# Patient Record
Sex: FEMALE | Race: WHITE | Employment: OTHER | URBAN - METROPOLITAN AREA
[De-identification: names, ages, dates, MRNs, and addresses within clinical notes are randomized per-mention and may not be internally consistent; named-entity substitution may affect disease eponyms.]

---

## 2017-02-08 RX ORDER — PRAVASTATIN SODIUM 20 MG/1
10 TABLET ORAL
Qty: 45 TAB | Refills: 3 | Status: SHIPPED | OUTPATIENT
Start: 2017-02-08 | End: 2017-08-07 | Stop reason: SDUPTHER

## 2017-02-08 NOTE — TELEPHONE ENCOUNTER
Requested Prescriptions     Signed Prescriptions Disp Refills    pravastatin (PRAVACHOL) 20 mg tablet 45 Tab 3     Sig: Take 0.5 Tabs by mouth nightly.      Authorizing Provider: Jaycee Bobo     Ordering User: Concepcion Alfaro

## 2017-08-07 ENCOUNTER — OFFICE VISIT (OUTPATIENT)
Dept: CARDIOLOGY CLINIC | Age: 62
End: 2017-08-07

## 2017-08-07 VITALS
BODY MASS INDEX: 26.55 KG/M2 | SYSTOLIC BLOOD PRESSURE: 130 MMHG | DIASTOLIC BLOOD PRESSURE: 88 MMHG | WEIGHT: 165.2 LBS | HEIGHT: 66 IN | HEART RATE: 68 BPM

## 2017-08-07 DIAGNOSIS — I10 ESSENTIAL HYPERTENSION: Primary | ICD-10-CM

## 2017-08-07 DIAGNOSIS — E78.5 DYSLIPIDEMIA: ICD-10-CM

## 2017-08-07 DIAGNOSIS — R01.1 SYSTOLIC EJECTION MURMUR: ICD-10-CM

## 2017-08-07 DIAGNOSIS — I36.1 NON-RHEUMATIC TRICUSPID VALVE INSUFFICIENCY: ICD-10-CM

## 2017-08-07 DIAGNOSIS — I49.3 PVC (PREMATURE VENTRICULAR CONTRACTION): ICD-10-CM

## 2017-08-07 DIAGNOSIS — I34.0 NON-RHEUMATIC MITRAL REGURGITATION: ICD-10-CM

## 2017-08-07 DIAGNOSIS — E87.6 HYPOKALEMIA: ICD-10-CM

## 2017-08-07 DIAGNOSIS — I25.10 CORONARY ARTERY DISEASE INVOLVING NATIVE HEART WITHOUT ANGINA PECTORIS, UNSPECIFIED VESSEL OR LESION TYPE: ICD-10-CM

## 2017-08-07 RX ORDER — RANITIDINE 150 MG/1
150 TABLET, FILM COATED ORAL 2 TIMES DAILY
COMMUNITY
End: 2018-02-12

## 2017-08-07 RX ORDER — PRAVASTATIN SODIUM 20 MG/1
20 TABLET ORAL
Qty: 90 TAB | Refills: 3 | Status: SHIPPED | OUTPATIENT
Start: 2017-08-07 | End: 2019-02-07 | Stop reason: SDUPTHER

## 2017-08-07 RX ORDER — POTASSIUM CHLORIDE 1500 MG/1
20 TABLET, FILM COATED, EXTENDED RELEASE ORAL 2 TIMES DAILY
Qty: 180 TAB | Refills: 3 | Status: SHIPPED | OUTPATIENT
Start: 2017-08-07 | End: 2017-10-02 | Stop reason: DRUGHIGH

## 2017-08-07 RX ORDER — SPIRONOLACTONE 25 MG/1
12.5 TABLET ORAL DAILY
Qty: 45 TAB | Refills: 0 | Status: SHIPPED | OUTPATIENT
Start: 2017-08-07 | End: 2017-11-10 | Stop reason: SDUPTHER

## 2017-08-07 NOTE — PATIENT INSTRUCTIONS
Please start taking spironolactone 12.5mg (1/2 of 25mg tablet) daily to help your low potassium levels  Please reduce your potassium chloride to 20meq twice daily   Please have your potassium level rechecked with Dr. González Saavedra in September - please ask her office to send me a copy of your lab results. Please check your blood pressure daily (at least one hour after your morning blood pressure medications.)  Keep a written record of your blood pressure readings and send your readings in to our office in 1 month.     Please increase your pravastatin to 20mg daily to better control your cholesterol

## 2017-08-07 NOTE — MR AVS SNAPSHOT
Visit Information Date & Time Provider Department Dept. Phone Encounter #  
 8/7/2017  1:00 PM Tato Dorado MD [facility] [phone] [id number] Your Appointments 2/12/2018  1:00 PM  
ECHO CARDIOGRAMS 2D with Nayana Mireles [facility] (TARA SCHEDULING) Appt Note: Per Dr. Shaaron Schaumann Echo dx MR, TR, See Shaaron Schaumann after  
 [address] [facility] [address]  
[address] [facility]  
  
    
 2/12/2018  2:00 PM  
ESTABLISHED PATIENT with Tato Dorado MD  
[facility] ([address]) Appt Note: Per Dr. Shaaron Schaumann Echo dx MR, TR, See Shaaron Schaumann after  
 [address] [place] [address]  
[address] [facility],[address] [address] Upcoming Health Maintenance Date Due Hepatitis C Screening 1955 DTaP/Tdap/Td series (1 - Tdap) 11/26/1975 PAP AKA CERVICAL CYTOLOGY 11/26/1975 FOBT Q 1 YEAR AGE 50-75 11/26/2004 ZOSTER VACCINE AGE 60> 9/26/2014 INFLUENZA AGE 9 TO ADULT 8/1/2017 BREAST CANCER SCRN MAMMOGRAM 10/17/2018 Allergies as of 8/7/2017  Review Complete On: 8/7/2017 By: Gabbie Figueroa Severity Noted Reaction Type Reactions Cephalexin  05/19/2015    Rash Penicillins  05/19/2015    Other (comments) Hay Rink Sulfa (Sulfonamide Antibiotics)  05/19/2015    Rash Current Immunizations  Never Reviewed No immunizations on file. Not reviewed this visit You Were Diagnosed With   
  
 Codes Comments Essential hypertension    -  Primary ICD-10-CM: I10 
ICD-9-CM: 401.9 Systolic ejection murmur     ICD-10-CM: R01.1 ICD-9-CM: 755. 2 Coronary artery disease involving native heart without angina pectoris, unspecified vessel or lesion type     ICD-10-CM: I25.10 ICD-9-CM: 414.01   
 Non-rheumatic tricuspid valve insufficiency     ICD-10-CM: I36.1 ICD-9-CM: 424.2 Non-rheumatic mitral regurgitation     ICD-10-CM: I34.0 ICD-9-CM: 424.0 Hypokalemia     ICD-10-CM: E87.6 ICD-9-CM: 276.8 Dyslipidemia     ICD-10-CM: E78.5 ICD-9-CM: 272.4 PVC (premature ventricular contraction)     ICD-10-CM: I49.3 ICD-9-CM: 427.69 Vitals BP Pulse Height(growth percentile) Weight(growth percentile) BMI OB Status 130/88 (BP 1 Location: Left arm, BP Patient Position: Sitting) 68 5' 6\" (1.676 m) 165 lb 3.2 oz (74.9 kg) 26.66 kg/m2 Postmenopausal  
 Smoking Status Never Smoker Vitals History BMI and BSA Data Body Mass Index Body Surface Area  
 26.66 kg/m 2 1.87 m 2 Preferred Pharmacy Pharmacy Name Phone [address] [facility] [phone] Your Updated Medication List  
  
   
This list is accurate as of: 8/7/17  2:17 PM.  Always use your most recent med list.  
  
  
  
  
 ALEVE 220 mg Cap Generic drug:  naproxen sodium Take  by mouth. AMBIEN 10 mg tablet Generic drug:  zolpidem Take  by mouth nightly as needed for Sleep. amLODIPine 5 mg tablet Commonly known as:  Inocencia Jia Take 5 mg by mouth daily. aspirin delayed-release 81 mg tablet Take  by mouth daily. calcium polycarbophil 625 mg tablet Commonly known as:  Lukas Kennedi Take 625 mg by mouth daily. calcium-cholecalciferol (D3) tablet Commonly known as:  CALTRATE 600+D Take 1 Tab by mouth daily. leucovorin calcium 5 mg tablet Commonly known as:  Felisa Nineveh Take  by mouth daily. levothyroxine 112 mcg tablet Commonly known as:  SYNTHROID Take  by mouth Daily (before breakfast). methotrexate 2.5 mg tablet Commonly known as:  Rafael Shiver Take 2.5 mg by mouth.  
  
 potassium chloride SR 20 mEq tablet Commonly known as:  KLOR-CON 10  
 Take 1 Tab by mouth two (2) times a day. pravastatin 20 mg tablet Commonly known as:  PRAVACHOL Take 1 Tab by mouth nightly. REMICADE 100 mg injection Generic drug:  inFLIXimab  
5 mg/kg by IntraVENous route once. EVERY 8 WEEKS  
  
 spironolactone 25 mg tablet Commonly known as:  ALDACTONE Take 0.5 Tabs by mouth daily. triamterene-hydroCHLOROthiazide 75-50 mg per tablet Commonly known as:  Brendan Feeling Take  by mouth daily. VITAMIN D3 2,000 unit Tab Generic drug:  cholecalciferol (vitamin D3) Take  by mouth. ZANTAC 150 mg tablet Generic drug:  raNITIdine Take 150 mg by mouth two (2) times a day. ZYRTEC PO Take 20 mg by mouth. Prescriptions Sent to Pharmacy Refills  
 spironolactone (ALDACTONE) 25 mg tablet 0 Sig: Take 0.5 Tabs by mouth daily. Class: Normal  
 Pharmacy: [address] [place] [address] Ph #: [phone] Route: Oral  
 pravastatin (PRAVACHOL) 20 mg tablet 3 Sig: Take 1 Tab by mouth nightly. Class: Normal  
 Pharmacy: [address], [address] Ph #: [phone] Route: Oral  
 potassium chloride SR (K-TAB) 20 mEq tablet 3 Sig: Take 1 Tab by mouth two (2) times a day. Class: Normal  
 Pharmacy: [address], [address] Ph #: [phone] Route: Oral  
  
We Performed the Following AMB POC EKG ROUTINE W/ 12 LEADS, INTER & REP [88727 CPT(R)] To-Do List   
 08/07/2017 ECHO:  2D ECHO COMPLETE ADULT (TTE) W OR WO CONTR Patient Instructions Please start taking spironolactone 12.5mg (1/2 of 25mg tablet) daily to help your low potassium levels Please reduce your potassium chloride to 20meq twice daily Please have your potassium level rechecked with Dr. Mitesh Rubi in September - please ask her office to send me a copy of your lab results. Please check your blood pressure daily (at least one hour after your morning blood pressure medications.)  Keep a written record of your blood pressure readings and send your readings in to our office in 1 month. Please increase your pravastatin to 20mg daily to better control your cholesterol Introducing [facility] SERVICES! Dear Wiliam Russell: Thank you for requesting a [employer] account. Our records indicate that you already have an active [employer] account. You can access your account anytime at [email or web address] Did you know that you can access your hospital and ER discharge instructions at any time in [employer]? You can also review all of your test results from your hospital stay or ER visit. Additional Information If you have questions, please visit the Frequently Asked Questions section of the [employer] website at [email or web address] Remember, [employer] is NOT to be used for urgent needs. For medical emergencies, dial 911. Now available from your iPhone and Android! Please provide this summary of care documentation to your next provider. Your primary care clinician is listed as Darrel Carter. If you have any questions after today's visit, please call [phone].

## 2017-10-02 ENCOUNTER — DOCUMENTATION ONLY (OUTPATIENT)
Dept: CARDIOLOGY CLINIC | Age: 62
End: 2017-10-02

## 2017-10-02 DIAGNOSIS — E87.6 HYPOKALEMIA: Primary | ICD-10-CM

## 2017-10-02 RX ORDER — POTASSIUM CHLORIDE 750 MG/1
20 TABLET, EXTENDED RELEASE ORAL 2 TIMES DAILY
Qty: 360 TAB | Refills: 1 | Status: SHIPPED | OUTPATIENT
Start: 2017-10-02 | End: 2018-03-31 | Stop reason: SDUPTHER

## 2017-10-23 ENCOUNTER — HOSPITAL ENCOUNTER (OUTPATIENT)
Dept: MAMMOGRAPHY | Age: 62
Discharge: HOME | End: 2017-10-23
Attending: FAMILY MEDICINE
Payer: COMMERCIAL

## 2017-10-23 DIAGNOSIS — Z12.31 VISIT FOR SCREENING MAMMOGRAM: ICD-10-CM

## 2017-10-23 PROCEDURE — 77067 SCR MAMMO BI INCL CAD: CPT

## 2018-02-12 ENCOUNTER — CLINICAL SUPPORT (OUTPATIENT)
Dept: CARDIOLOGY CLINIC | Age: 63
End: 2018-02-12

## 2018-02-12 ENCOUNTER — OFFICE VISIT (OUTPATIENT)
Dept: CARDIOLOGY CLINIC | Age: 63
End: 2018-02-12

## 2018-02-12 VITALS
HEART RATE: 66 BPM | SYSTOLIC BLOOD PRESSURE: 130 MMHG | DIASTOLIC BLOOD PRESSURE: 80 MMHG | BODY MASS INDEX: 27.64 KG/M2 | HEIGHT: 66 IN | WEIGHT: 172 LBS

## 2018-02-12 DIAGNOSIS — I10 ESSENTIAL HYPERTENSION: ICD-10-CM

## 2018-02-12 DIAGNOSIS — E78.5 DYSLIPIDEMIA: ICD-10-CM

## 2018-02-12 DIAGNOSIS — I35.1 NONRHEUMATIC AORTIC VALVE INSUFFICIENCY: ICD-10-CM

## 2018-02-12 DIAGNOSIS — I34.0 NON-RHEUMATIC MITRAL REGURGITATION: Primary | ICD-10-CM

## 2018-02-12 DIAGNOSIS — E87.6 HYPOKALEMIA: ICD-10-CM

## 2018-02-12 DIAGNOSIS — I49.3 PVC (PREMATURE VENTRICULAR CONTRACTION): ICD-10-CM

## 2018-02-12 DIAGNOSIS — I25.10 CORONARY ARTERY DISEASE INVOLVING NATIVE CORONARY ARTERY OF NATIVE HEART WITHOUT ANGINA PECTORIS: Primary | ICD-10-CM

## 2018-02-12 DIAGNOSIS — I36.1 NON-RHEUMATIC TRICUSPID VALVE INSUFFICIENCY: ICD-10-CM

## 2018-02-12 DIAGNOSIS — R00.2 PALPITATIONS: ICD-10-CM

## 2018-02-12 RX ORDER — ACETAMINOPHEN 500 MG
1000 TABLET ORAL
COMMUNITY
End: 2021-09-28

## 2018-02-12 RX ORDER — HYDROGEN PEROXIDE 3 %
SOLUTION, NON-ORAL MISCELLANEOUS DAILY
Status: ON HOLD | COMMUNITY
End: 2021-02-26

## 2018-02-12 NOTE — PROGRESS NOTES
[facility]  ([phone]    HPI: Richard Mclaughlin, a 60y.o. year-old who presents for follow up regarding CAD and PVCs. She denies any chest pain, her palpitations are well controlled  Says she has very rare palpitations and episodes are brief  She denies any dyspnea with exertion, PND or orthopnea  She denies dizziness or syncope  No LE edema   She continues to walk 5-10K steps/day, if the weather is bad outside she will ride a stationary bike for 30 minutes to an hour  She checks her blood pressure at home and it usually runs 110/60  RA is doing ok on remicade  Prefers to see me once a year but gets labs with Dr. Karlene Stanley, just lad labs drawn for Dr. Karlene Stanley in December 2017    **Labs dated 12/21/17 received on 2/13/18  , TG 65, HDL 67, LDL 86  TSH 0.6  LFTs ok, BMP ok  WBC 12.6, Hgb 15.5 and Plt 296  Hx of hypothyroidism, GERD     **Labs received after patient's last office visit dated 12/18/18  BMP ok - K 3.8, LFTs ok  , TG 68, HDL 74, LDL 92  TSH 0.473    Assessment/Plan:  1. Palpitations - holter 6/15 ok with rare PACs/PVCs, not bothering her currently  2. RA - on remicade infusions and MTX, on leukovorin, followed by Dr. Norma Kumar  3. HTN - well controlled on amlodipine and spironolactone, didn't want to try ACEi in the past due to the side effect of cough  4. Hypokalemia - on KCL 20meq BID and spironolactone 12.5mg daily, will request lab results from Dr. Swanson Second office   5. Meniere's disease - off maxzide now, watches salt intake  6. Mild AI by TTE today     7. Dyslipidemia - LDL 84 in 6/17 and she was advised to increase her pravastatin to 20mg daily since LDL goal < 70 with CAD, will request lab results from Dr. Swanson Second office   8.   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 Past Surgical History:   Procedure Laterality Date    HX BREAST BIOPSY Left     benign stereo     HX CHOLECYSTECTOMY      HX GYN  1994    D&C      Cardiovascular ROS: no chest pain or dyspnea on exertion, positive for palpitations  Respiratory ROS: no cough, shortness of breath, or wheezing  Neurological ROS: no TIA or stroke symptoms  All other systems negative except as above. PE  Vitals:    02/12/18 1345   BP: 130/80   Pulse: 66   Weight: 172 lb (78 kg)   Height: 5' 6\" (1.676 m)    Body mass index is 27.76 kg/(m^2).    General appearance - alert, well appearing, and in no distress  Mental status - affect appropriate to mood  Eyes - sclera anicteric, moist mucous membranes  Neck - supple, no carotid bruits   Lymphatics - not assessed   Chest - clear to auscultation, no wheezes, rales or rhonchi  Heart - normal rate, regular rhythm, normal S1, S2, 2/6 RIMMA heard best at LSB  Abdomen - soft, nontender, nondistended, no masses or organomegaly  Back exam - full range of motion, no tenderness  Neurological - cranial nerves II through XII grossly intact, no focal deficit  Musculoskeletal - no muscular tenderness noted, normal strength  Extremities - peripheral pulses normal, no pedal edema  Skin - normal coloration  no rashes    Recent Labs:  Lab Results   Component Value Date/Time    Cholesterol, total 165 04/15/2016 08:45 AM     No results found for: GALLITO  Lab Results   Component Value Date/Time    BUN 12 04/15/2016 08:45 AM     Lab Results   Component Value Date/Time    Potassium 3.8 04/15/2016 08:45 AM     No results found for: HBA1C, [id number]  No components found for: HGBI,  IHGB,  HGB,  HGBP,  WBHGB  No results found for: PLT, PLTEXT    Reviewed:  Past Medical History:   Diagnosis Date    Essential hypertension     Meniere's disease     Murmur     Osteopenia     Osteoporosis     PVC (premature ventricular contraction)     Rheumatoid arthritis ([facility]      History   Smoking Status    Never Smoker   Smokeless Tobacco    Never Used     History   Alcohol Use No     Allergies   Allergen Reactions    Cephalexin Rash    Penicillins Other (comments)     Chilld    Sulfa (Sulfonamide Antibiotics) Rash       Current Outpatient Prescriptions   Medication Sig    esomeprazole (NEXIUM) 20 mg capsule Take  by mouth daily.  KRILL OIL PO Take 1 Cap by mouth daily.  acetaminophen (TYLENOL EXTRA STRENGTH) 500 mg tablet Take 1,000 mg by mouth every six (6) hours as needed for Pain.  spironolactone (ALDACTONE) 25 mg tablet TAKE ONE-HALF TABLET BY MOUTH ONCE DAILY    potassium chloride (KLOR-CON) 10 mEq tablet Take 2 Tabs by mouth two (2) times a day.  pravastatin (PRAVACHOL) 20 mg tablet Take 1 Tab by mouth nightly.  aspirin delayed-release 81 mg tablet Take  by mouth daily.  levothyroxine (SYNTHROID) 112 mcg tablet Take  by mouth Daily (before breakfast).  leucovorin calcium (WELLCOVORIN) 5 mg tablet Take  by mouth daily.  methotrexate (RHEUMATREX) 2.5 mg tablet Take 2.5 mg by mouth.  amLODIPine (NORVASC) 5 mg tablet Take 5 mg by mouth daily.  inFLIXimab (REMICADE) 100 mg injection 5 mg/kg by IntraVENous route once. EVERY 8 WEEKS    zolpidem (AMBIEN) 10 mg tablet Take  by mouth nightly as needed for Sleep.  CETIRIZINE HCL (ZYRTEC PO) Take 20 mg by mouth.  calcium polycarbophil (FIBERCON) 625 mg tablet Take 625 mg by mouth daily.  cholecalciferol, vitamin D3, (VITAMIN D3) 2,000 unit tab Take  by mouth.     naproxen sodium (ALEVE) 220 mg cap Take  by mouth. No current facility-administered medications for this visit.         Gonsalo Mclaughlin MD  [facility] and V[facility] [place]  [address] [facility]  [address]

## 2018-06-04 RX ORDER — SPIRONOLACTONE 25 MG/1
TABLET ORAL
Qty: 45 TAB | Refills: 1 | Status: ON HOLD | OUTPATIENT
Start: 2018-06-04 | End: 2022-05-23 | Stop reason: SDUPTHER

## 2018-11-19 ENCOUNTER — HOSPITAL ENCOUNTER (OUTPATIENT)
Dept: MAMMOGRAPHY | Age: 63
Discharge: HOME | End: 2018-11-19
Attending: FAMILY MEDICINE
Payer: COMMERCIAL

## 2018-11-19 DIAGNOSIS — Z12.39 SCREENING BREAST EXAMINATION: ICD-10-CM

## 2018-11-19 PROCEDURE — 77063 BREAST TOMOSYNTHESIS BI: CPT

## 2019-01-08 ENCOUNTER — DOCUMENTATION ONLY (OUTPATIENT)
Dept: CARDIOLOGY CLINIC | Age: 64
End: 2019-01-08

## 2019-01-08 NOTE — PROGRESS NOTES
Got labs from PCP, LDL too high at 92  LDL goal < 70  Please advise her to increase her pravastatin to 40mg daily  We will plan to recheck lipids in 3 months     Future Appointments   Date Time Provider Leticia Fitzgerald   2/7/2019 11:45 AM Ting Woods  [address]

## 2019-01-09 ENCOUNTER — TELEPHONE (OUTPATIENT)
Dept: CARDIOLOGY CLINIC | Age: 64
End: 2019-01-09

## 2019-01-09 NOTE — TELEPHONE ENCOUNTER
Got labs from PCP, LDL too high at 92  LDL goal < 70  Please advise her to increase her pravastatin to 40mg daily  We will plan to recheck lipids in 3 months             Future Appointments   Date Time Provider Leticia Fitzgerald   2/7/2019 11:45 AM Wilfredo Dewitt  [address]      Left a VMM on patient's confidential phone to increase medication to 40 mg. She may return call with questions.

## 2019-02-07 ENCOUNTER — OFFICE VISIT (OUTPATIENT)
Dept: CARDIOLOGY CLINIC | Age: 64
End: 2019-02-07

## 2019-02-07 VITALS
HEART RATE: 66 BPM | HEIGHT: 66 IN | BODY MASS INDEX: 27.58 KG/M2 | DIASTOLIC BLOOD PRESSURE: 76 MMHG | WEIGHT: 171.6 LBS | SYSTOLIC BLOOD PRESSURE: 124 MMHG | RESPIRATION RATE: 16 BRPM

## 2019-02-07 DIAGNOSIS — I10 ESSENTIAL HYPERTENSION: ICD-10-CM

## 2019-02-07 DIAGNOSIS — E78.5 DYSLIPIDEMIA: ICD-10-CM

## 2019-02-07 DIAGNOSIS — I25.10 CORONARY ARTERY DISEASE INVOLVING NATIVE CORONARY ARTERY OF NATIVE HEART WITHOUT ANGINA PECTORIS: Primary | ICD-10-CM

## 2019-02-07 DIAGNOSIS — I35.1 NONRHEUMATIC AORTIC VALVE INSUFFICIENCY: ICD-10-CM

## 2019-02-07 RX ORDER — PRAVASTATIN SODIUM 40 MG/1
40 TABLET ORAL
Qty: 90 TAB | Refills: 1 | Status: SHIPPED | OUTPATIENT
Start: 2019-02-07 | End: 2019-07-22 | Stop reason: SDUPTHER

## 2019-02-07 NOTE — PROGRESS NOTES
[facility]  ([phone]    HPI: Shon Morgan, a 58y.o. year-old who presents for follow up regarding CAD and PVCs. She denies any chest pain, her palpitations are well controlled  Says she has very rare palpitations and episodes are brief  She denies any dyspnea with exertion, PND or orthopnea  She denies dizziness or syncope  No LE edema   She continues to walk 5-10K steps/day, if the weather is bad outside she will ride a stationary bike for 30 minutes to an hour, staying very active   Blood pressure well controlled at home   Prefers to see me once a year but gets labs with Dr. Mulu Nicole, will have labs with her again in June 2018   Reading a good sci-fi novel today    Assessment/Plan:  1. Palpitations - holter 6/15 ok with rare PACs/PVCs, not bothering her currently  2. RA - on remicade infusions and MTX, on leukovorin, followed by Dr. Kevin Devries  3. HTN - well controlled on amlodipine and spironolactone, didn't want to try ACEi in the past due to the side effect of cough  4. Hypokalemia - well controlled on KCL 20meq BID and spironolactone 12.5mg daily   5. Meniere's disease - off maxzide now, watches salt intake  6. Mild AI by TTE in 2/18 - will repeat TTE at her next visit   7. Dyslipidemia - Lipids 12/18 - , TG 68, HDL 74, LDL 92, at that time she was advised to increase pravastatin to 40mg daily, LDL goal < 70 with CAD, will have fasting lipids with Dr. Mulu Nicole in June 2019  8. CAD - calcium scan 105 in 5/15, continue ASA and pravastatin, asymptomatic, follow up in 1 year   9. GERD - stable on PPI  10.  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 Past Surgical History:   Procedure Laterality Date    HX BREAST BIOPSY Left     benign stereo     HX CHOLECYSTECTOMY      HX GYN  1994    D&C      Cardiovascular ROS: no chest pain or dyspnea on exertion, positive for palpitations  Respiratory ROS: no cough or wheezing  Neurological ROS: no TIA or stroke symptoms  All other systems negative except as above. PE  Vitals:    02/07/19 1135   BP: 124/76   Pulse: 66   Resp: 16   Weight: 171 lb 9.6 oz (77.8 kg)   Height: 5' 6\" (1.676 m)    Body mass index is 27.7 kg/m².    General appearance - alert, well appearing, and in no distress  Mental status - affect appropriate to mood  Eyes - sclera anicteric, moist mucous membranes  Neck - supple, no carotid bruits   Lymphatics - not assessed   Chest - clear to auscultation, no wheezes, rales or rhonchi  Heart - normal rate, regular rhythm, normal S1, S2, 3/6 RIMMA heard best at LSB  Abdomen - soft, nontender, nondistended  Back exam - full range of motion, no tenderness  Neurological - cranial nerves II through XII grossly intact, no focal deficit  Musculoskeletal - no muscular tenderness noted, normal strength  Extremities - peripheral pulses normal, no pedal edema  Skin - normal coloration  no rashes    12 lead ECG: NSR     Recent Labs:  Lab Results   Component Value Date/Time    Cholesterol, total 165 04/15/2016 08:45 AM     No results found for: GALLITO  Lab Results   Component Value Date/Time    BUN 12 04/15/2016 08:45 AM     Lab Results   Component Value Date/Time    Potassium 3.8 04/15/2016 08:45 AM     No results found for: HBA1C, [id number]  No components found for: HGBI,  IHGB,  HGB,  HGBP,  WBHGB  No results found for: PLT, PLTEXT    Reviewed:  Past Medical History:   Diagnosis Date    Essential hypertension     Meniere's disease     Murmur     Osteopenia     Osteoporosis     PVC (premature ventricular contraction)     Rheumatoid arthritis (H[facility])      Social History     Tobacco Use   Smoking Status Never Smoker   Smokeless Tobacco Never Used     Social History     Substance and Sexual Activity   Alcohol Use No    Alcohol/week: 0.0 oz     Allergies   Allergen Reactions    Cephalexin Rash    Penicillins Other (comments)     Chilld    Sulfa (Sulfonamide Antibiotics) Rash       Current Outpatient Medications   Medication Sig    pravastatin (PRAVACHOL) 40 mg tablet Take 1 Tab by mouth nightly.  spironolactone (ALDACTONE) 25 mg tablet TAKE ONE-HALF TABLET BY MOUTH ONCE DAILY    potassium chloride SR (KLOR-CON 10) 10 mEq tablet TAKE 2 TABLETS TWICE A DAY    esomeprazole (NEXIUM) 20 mg capsule Take  by mouth daily.  KRILL OIL PO Take 1 Cap by mouth daily.  acetaminophen (TYLENOL EXTRA STRENGTH) 500 mg tablet Take 1,000 mg by mouth every six (6) hours as needed for Pain.  aspirin delayed-release 81 mg tablet Take  by mouth daily.  levothyroxine (SYNTHROID) 112 mcg tablet Take  by mouth Daily (before breakfast).  leucovorin calcium (WELLCOVORIN) 5 mg tablet Take  by mouth daily.  methotrexate (RHEUMATREX) 2.5 mg tablet Take 2.5 mg by mouth.  amLODIPine (NORVASC) 5 mg tablet Take 5 mg by mouth daily.  inFLIXimab (REMICADE) 100 mg injection 5 mg/kg by IntraVENous route once. EVERY 8 WEEKS    zolpidem (AMBIEN) 10 mg tablet Take  by mouth nightly as needed for Sleep.  CETIRIZINE HCL (ZYRTEC PO) Take 20 mg by mouth.  calcium polycarbophil (FIBERCON) 625 mg tablet Take 625 mg by mouth daily.  cholecalciferol, vitamin D3, (VITAMIN D3) 2,000 unit tab Take  by mouth.  naproxen sodium (ALEVE) 220 mg cap Take  by mouth. No current facility-administered medications for this visit.         Jeanne Blank MD  [facility] and [facility] [place]  [facility], [address], [address]

## 2019-12-02 ENCOUNTER — HOSPITAL ENCOUNTER (OUTPATIENT)
Dept: MAMMOGRAPHY | Age: 64
Discharge: HOME | End: 2019-12-02
Attending: FAMILY MEDICINE
Payer: MEDICARE

## 2019-12-02 DIAGNOSIS — Z12.31 VISIT FOR SCREENING MAMMOGRAM: ICD-10-CM

## 2019-12-02 PROCEDURE — 77063 BREAST TOMOSYNTHESIS BI: CPT

## 2020-02-12 ENCOUNTER — OFFICE VISIT (OUTPATIENT)
Dept: CARDIOLOGY CLINIC | Age: 65
End: 2020-02-12

## 2020-02-12 VITALS
OXYGEN SATURATION: 98 % | DIASTOLIC BLOOD PRESSURE: 70 MMHG | RESPIRATION RATE: 16 BRPM | BODY MASS INDEX: 27.58 KG/M2 | HEART RATE: 64 BPM | WEIGHT: 171.6 LBS | HEIGHT: 66 IN | SYSTOLIC BLOOD PRESSURE: 130 MMHG

## 2020-02-12 DIAGNOSIS — R01.1 SYSTOLIC EJECTION MURMUR: ICD-10-CM

## 2020-02-12 DIAGNOSIS — I49.3 PVC (PREMATURE VENTRICULAR CONTRACTION): ICD-10-CM

## 2020-02-12 DIAGNOSIS — E78.5 DYSLIPIDEMIA: ICD-10-CM

## 2020-02-12 DIAGNOSIS — I25.10 CORONARY ARTERY DISEASE INVOLVING NATIVE CORONARY ARTERY OF NATIVE HEART WITHOUT ANGINA PECTORIS: ICD-10-CM

## 2020-02-12 DIAGNOSIS — I35.1 AORTIC VALVE INSUFFICIENCY, ETIOLOGY OF CARDIAC VALVE DISEASE UNSPECIFIED: ICD-10-CM

## 2020-02-12 DIAGNOSIS — I35.1 NONRHEUMATIC AORTIC VALVE INSUFFICIENCY: ICD-10-CM

## 2020-02-12 DIAGNOSIS — R42 DIZZINESS: ICD-10-CM

## 2020-02-12 DIAGNOSIS — I10 ESSENTIAL HYPERTENSION: Primary | ICD-10-CM

## 2020-02-12 NOTE — PROGRESS NOTES
[facility]  [phone]    HPI: Lenny Briggs, a 71y.o. year-old who presents for follow up regarding CAD and PVCs. She denies any chest pain, her palpitations are well controlled  Says she has very rare palpitations and episodes are brief  She denies any dyspnea with exertion, PND or orthopnea  She denies dizziness or syncope  No LE edema   She continues to walk 5-10K steps/day, if the weather is bad outside she will ride a stationary bike for 30 minutes to an hour, staying very active   Blood pressure well controlled at home   Prefers to see me once a year but gets labs with Dr. Cathie Huizar cv issues are stable, mild, for now  Reading a good sci-fi novel     Assessment/Plan:  1. Palpitations - holter 6/15 ok with rare PACs/PVCs, not bothering her currently  2. RA - on remicade infusions and MTX, on leukovorin, followed by Dr. Lima Dubon  3. HTN - well controlled on amlodipine and spironolactone, didn't want to try ACEi in the past due to the side effect of cough  4. Hypokalemia - well controlled on KCL 20meq BID and spironolactone 12.5mg daily   5. Meniere's disease - off maxzide now, watches salt intake  6. Mild AI by TTE in 2/18 - will repeat TTE at her next visit   7. Dyslipidemia - Lipids 12/18 - , TG 68, HDL 74, LDL 92, at that time she was advised to increase pravastatin to 40mg daily, LDL goal < 70 with CAD, could not tolerate it at that higher dose and cut it back down to 20 and doing much better now. -LDL at 82 on most recent labs. Trying to watch her sweets. Reducing meats etc.   8.  CAD - calcium scan 105 in 5/15, continue ASA and pravastatin, asymptomatic, follow up in 1 year   9. GERD - stable on PPI  10.  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 Past Surgical History:   Procedure Laterality Date    HX BREAST BIOPSY Left     benign stereo     HX CHOLECYSTECTOMY      HX GYN  1994    D&C      Cardiovascular ROS: no chest pain or dyspnea on exertion, positive for palpitations  Respiratory ROS: no cough or wheezing  Neurological ROS: no TIA or stroke symptoms  All other systems negative except as above. PE  Vitals:    02/12/20 1112   BP: 130/70   Pulse: 64   Resp: 16   SpO2: 98%   Weight: 171 lb 9.6 oz (77.8 kg)   Height: 5' 6\" (1.676 m)    Body mass index is 27.7 kg/m².    General appearance - alert, well appearing, and in no distress  Mental status - affect appropriate to mood  Eyes - sclera anicteric, moist mucous membranes  Neck - supple, no carotid bruits   Lymphatics - not assessed   Chest - clear to auscultation, no wheezes, rales or rhonchi  Heart - normal rate, regular rhythm, normal S1, S2, 3/6 RIMMA heard best at LSB  Abdomen - soft, nontender, nondistended  Back exam - full range of motion, no tenderness  Neurological - cranial nerves II through XII grossly intact, no focal deficit  Musculoskeletal - no muscular tenderness noted, normal strength  Extremities - peripheral pulses normal, no pedal edema  Skin - normal coloration  no rashes    12 lead ECG: NSR     Recent Labs:  Lab Results   Component Value Date/Time    Cholesterol, total 165 04/15/2016 08:45 AM     No results found for: GALLITO  Lab Results   Component Value Date/Time    BUN 12 04/15/2016 08:45 AM     Lab Results   Component Value Date/Time    Potassium 3.8 04/15/2016 08:45 AM     No results found for: HBA1C, BEI5WOVG  No components found for: HGBI,  IHGB,  HGB,  HGBP,  WBHGB  No results found for: PLT, PLTEXT    Reviewed:  Past Medical History:   Diagnosis Date    Essential hypertension     Meniere's disease     Murmur     Osteopenia     Osteoporosis     PVC (premature ventricular contraction)     Rheumatoid arthritis ([facility] [address]      Social History     Tobacco Use   Smoking Status Never Smoker   Smokeless Tobacco Never Used     Social History     Substance and Sexual Activity   Alcohol Use No    Alcohol/week: 0.0 standard drinks     Allergies   Allergen Reactions    Cephalexin Rash    Penicillins Other (comments)     Chilld    Sulfa (Sulfonamide Antibiotics) Rash       Current Outpatient Medications   Medication Sig    pravastatin (PRAVACHOL) 40 mg tablet TAKE 1 TABLET NIGHTLY (Patient taking differently: Take 20 mg by mouth daily.)    spironolactone (ALDACTONE) 25 mg tablet TAKE ONE-HALF TABLET BY MOUTH ONCE DAILY    potassium chloride SR (KLOR-CON 10) 10 mEq tablet TAKE 2 TABLETS TWICE A DAY    esomeprazole (NEXIUM) 20 mg capsule Take  by mouth daily.  KRILL OIL PO Take 1 Cap by mouth daily.  acetaminophen (TYLENOL EXTRA STRENGTH) 500 mg tablet Take 1,000 mg by mouth every six (6) hours as needed for Pain.  aspirin delayed-release 81 mg tablet Take  by mouth daily.  levothyroxine (SYNTHROID) 112 mcg tablet Take  by mouth Daily (before breakfast).  leucovorin calcium (WELLCOVORIN) 5 mg tablet Take  by mouth daily.  methotrexate (RHEUMATREX) 2.5 mg tablet Take 2.5 mg by mouth.  amLODIPine (NORVASC) 5 mg tablet Take 5 mg by mouth daily.  inFLIXimab (REMICADE) 100 mg injection 5 mg/kg by IntraVENous route once. EVERY 8 WEEKS    zolpidem (AMBIEN) 10 mg tablet Take  by mouth nightly as needed for Sleep.  CETIRIZINE HCL (ZYRTEC PO) Take 20 mg by mouth.  calcium polycarbophil (FIBERCON) 625 mg tablet Take 625 mg by mouth daily.     cholecalciferol, vitamin D3, (VITAMIN D3) 2,000 unit tab Take  by mouth.  naproxen sodium (ALEVE) 220 mg cap Take  by mouth. No current facility-administered medications for this visit.         Sebastian Hinds MD  [facility] [place]  [facility] [address] [facility] [address]  [address]

## 2020-11-11 ENCOUNTER — TRANSCRIBE ORDER (OUTPATIENT)
Dept: SCHEDULING | Age: 65
End: 2020-11-11

## 2020-11-11 DIAGNOSIS — Z12.31 VISIT FOR SCREENING MAMMOGRAM: Primary | ICD-10-CM

## 2021-01-28 ENCOUNTER — HOSPITAL ENCOUNTER (OUTPATIENT)
Dept: MAMMOGRAPHY | Age: 66
Discharge: HOME | End: 2021-01-28
Attending: FAMILY MEDICINE
Payer: MEDICARE

## 2021-01-28 DIAGNOSIS — Z12.31 VISIT FOR SCREENING MAMMOGRAM: ICD-10-CM

## 2021-01-28 PROCEDURE — 77063 BREAST TOMOSYNTHESIS BI: CPT

## 2021-02-09 ENCOUNTER — HOSPITAL ENCOUNTER (OUTPATIENT)
Dept: MAMMOGRAPHY | Age: 66
Discharge: HOME | End: 2021-02-09
Attending: FAMILY MEDICINE
Payer: MEDICARE

## 2021-02-09 DIAGNOSIS — R92.8 ABNORMAL MAMMOGRAM OF BOTH BREASTS: ICD-10-CM

## 2021-02-09 PROCEDURE — 77062 BREAST TOMOSYNTHESIS BI: CPT

## 2021-02-09 PROCEDURE — 76642 ULTRASOUND BREAST LIMITED: CPT

## 2021-02-16 ENCOUNTER — TRANSCRIBE ORDER (OUTPATIENT)
Dept: SCHEDULING | Age: 66
End: 2021-02-16

## 2021-02-16 ENCOUNTER — HOSPITAL ENCOUNTER (OUTPATIENT)
Dept: MRI IMAGING | Age: 66
Discharge: HOME | End: 2021-02-16
Attending: PODIATRIST
Payer: MEDICARE

## 2021-02-16 DIAGNOSIS — S92.302A: ICD-10-CM

## 2021-02-16 DIAGNOSIS — S92.302A: Primary | ICD-10-CM

## 2021-02-16 PROCEDURE — 73718 MRI LOWER EXTREMITY W/O DYE: CPT

## 2021-02-22 ENCOUNTER — HOSPITAL ENCOUNTER (OUTPATIENT)
Dept: PREADMISSION TESTING | Age: 66
Discharge: HOME | End: 2021-02-22
Payer: MEDICARE

## 2021-02-22 ENCOUNTER — TRANSCRIBE ORDER (OUTPATIENT)
Dept: REGISTRATION | Age: 66
End: 2021-02-22

## 2021-02-22 DIAGNOSIS — Z01.812 PRE-PROCEDURE LAB EXAM: Primary | ICD-10-CM

## 2021-02-22 DIAGNOSIS — Z01.812 PRE-PROCEDURE LAB EXAM: ICD-10-CM

## 2021-02-22 PROCEDURE — U0003 INFECTIOUS AGENT DETECTION BY NUCLEIC ACID (DNA OR RNA); SEVERE ACUTE RESPIRATORY SYNDROME CORONAVIRUS 2 (SARS-COV-2) (CORONAVIRUS DISEASE [COVID-19]), AMPLIFIED PROBE TECHNIQUE, MAKING USE OF HIGH THROUGHPUT TECHNOLOGIES AS DESCRIBED BY CMS-2020-01-R: HCPCS

## 2021-02-23 ENCOUNTER — VIRTUAL VISIT (OUTPATIENT)
Dept: CARDIOLOGY CLINIC | Age: 66
End: 2021-02-23
Payer: MEDICARE

## 2021-02-23 DIAGNOSIS — H81.03 MENIERE DISEASE, BILATERAL: ICD-10-CM

## 2021-02-23 DIAGNOSIS — I49.3 PVC (PREMATURE VENTRICULAR CONTRACTION): ICD-10-CM

## 2021-02-23 DIAGNOSIS — I35.1 AORTIC VALVE INSUFFICIENCY, ETIOLOGY OF CARDIAC VALVE DISEASE UNSPECIFIED: Primary | ICD-10-CM

## 2021-02-23 DIAGNOSIS — I25.10 CORONARY ARTERY DISEASE INVOLVING NATIVE CORONARY ARTERY OF NATIVE HEART WITHOUT ANGINA PECTORIS: ICD-10-CM

## 2021-02-23 DIAGNOSIS — M05.79 RHEUMATOID ARTHRITIS INVOLVING MULTIPLE SITES WITH POSITIVE RHEUMATOID FACTOR (HCC): ICD-10-CM

## 2021-02-23 DIAGNOSIS — I10 ESSENTIAL HYPERTENSION: ICD-10-CM

## 2021-02-23 DIAGNOSIS — E78.5 DYSLIPIDEMIA: ICD-10-CM

## 2021-02-23 LAB — SARS-COV-2, COV2NT: NOT DETECTED

## 2021-02-23 PROCEDURE — 3017F COLORECTAL CA SCREEN DOC REV: CPT | Performed by: INTERNAL MEDICINE

## 2021-02-23 PROCEDURE — 1090F PRES/ABSN URINE INCON ASSESS: CPT | Performed by: INTERNAL MEDICINE

## 2021-02-23 PROCEDURE — G8427 DOCREV CUR MEDS BY ELIG CLIN: HCPCS | Performed by: INTERNAL MEDICINE

## 2021-02-23 PROCEDURE — G8432 DEP SCR NOT DOC, RNG: HCPCS | Performed by: INTERNAL MEDICINE

## 2021-02-23 PROCEDURE — G0463 HOSPITAL OUTPT CLINIC VISIT: HCPCS | Performed by: INTERNAL MEDICINE

## 2021-02-23 PROCEDURE — G9899 SCRN MAM PERF RSLTS DOC: HCPCS | Performed by: INTERNAL MEDICINE

## 2021-02-23 PROCEDURE — 1101F PT FALLS ASSESS-DOCD LE1/YR: CPT | Performed by: INTERNAL MEDICINE

## 2021-02-23 PROCEDURE — G8756 NO BP MEASURE DOC: HCPCS | Performed by: INTERNAL MEDICINE

## 2021-02-23 PROCEDURE — G8400 PT W/DXA NO RESULTS DOC: HCPCS | Performed by: INTERNAL MEDICINE

## 2021-02-23 PROCEDURE — 99214 OFFICE O/P EST MOD 30 MIN: CPT | Performed by: INTERNAL MEDICINE

## 2021-02-23 RX ORDER — PRAVASTATIN SODIUM 20 MG/1
20 TABLET ORAL
COMMUNITY
Start: 2020-12-26

## 2021-02-23 RX ORDER — OMEPRAZOLE 10 MG/1
10 CAPSULE, DELAYED RELEASE ORAL DAILY
COMMUNITY
End: 2021-09-15

## 2021-02-23 NOTE — PATIENT INSTRUCTIONS
MD Ana Espino, RN  
  
   
  
Fu visit one year with stress echo for cad Future Appointments Date Time Provider Leticia Fitzgerald 2/22/2022  1:00 PM STRESSECHOPENNY BS AMB  
2/22/2022  1:00 PM VASCULAR, PENNY HARRISON AMB  
2/22/2022  2:00 PM MD ELIEL Espino BS AMB

## 2021-02-25 PROBLEM — S99.199A: Status: ACTIVE | Noted: 2021-02-25

## 2021-02-25 RX ORDER — LIDOCAINE HYDROCHLORIDE 20 MG/ML
10 INJECTION, SOLUTION INFILTRATION; PERINEURAL ONCE
Status: CANCELLED | OUTPATIENT
Start: 2021-02-25 | End: 2021-02-26

## 2021-02-25 RX ORDER — DEXAMETHASONE SODIUM PHOSPHATE 4 MG/ML
4 INJECTION, SOLUTION INTRA-ARTICULAR; INTRALESIONAL; INTRAMUSCULAR; INTRAVENOUS; SOFT TISSUE ONCE
Status: CANCELLED | OUTPATIENT
Start: 2021-02-25 | End: 2021-02-26

## 2021-02-25 RX ORDER — BUPIVACAINE HYDROCHLORIDE 5 MG/ML
10 INJECTION, SOLUTION EPIDURAL; INTRACAUDAL
Status: CANCELLED | OUTPATIENT
Start: 2021-02-25 | End: 2021-02-25

## 2021-02-25 RX ORDER — BUPIVACAINE HYDROCHLORIDE 5 MG/ML
10 INJECTION, SOLUTION EPIDURAL; INTRACAUDAL ONCE
Status: CANCELLED | OUTPATIENT
Start: 2021-02-26 | End: 2021-02-26

## 2021-02-25 NOTE — H&P
Left foot open reduction internal fixation of the fifth metatarsal.  Patient acceptable risk for surgery and history and physical is updated and there are no changes.   Paperwork faxed to main OR

## 2021-02-26 ENCOUNTER — ANESTHESIA (OUTPATIENT)
Dept: SURGERY | Age: 66
End: 2021-02-26
Payer: MEDICARE

## 2021-02-26 ENCOUNTER — ANESTHESIA EVENT (OUTPATIENT)
Dept: SURGERY | Age: 66
End: 2021-02-26
Payer: MEDICARE

## 2021-02-26 ENCOUNTER — HOSPITAL ENCOUNTER (OUTPATIENT)
Age: 66
Setting detail: OUTPATIENT SURGERY
Discharge: HOME | End: 2021-02-26
Attending: PODIATRIST | Admitting: PODIATRIST
Payer: MEDICARE

## 2021-02-26 VITALS
TEMPERATURE: 98.1 F | DIASTOLIC BLOOD PRESSURE: 121 MMHG | BODY MASS INDEX: 28.28 KG/M2 | HEART RATE: 80 BPM | RESPIRATION RATE: 20 BRPM | WEIGHT: 176 LBS | HEIGHT: 66 IN | SYSTOLIC BLOOD PRESSURE: 139 MMHG | OXYGEN SATURATION: 94 %

## 2021-02-26 PROCEDURE — 76210000021 HC REC RM PH II 0.5 TO 1 HR: Performed by: PODIATRIST

## 2021-02-26 PROCEDURE — 77030002986 HC SUT PROL J&J -A: Performed by: PODIATRIST

## 2021-02-26 PROCEDURE — 77030020754 HC CUF TRNQT 2BLA STRY -B: Performed by: PODIATRIST

## 2021-02-26 PROCEDURE — 77030020275 HC MISC ORTHOPEDIC: Performed by: PODIATRIST

## 2021-02-26 PROCEDURE — 74011250637 HC RX REV CODE- 250/637: Performed by: ANESTHESIOLOGY

## 2021-02-26 PROCEDURE — 76210000006 HC OR PH I REC 0.5 TO 1 HR: Performed by: PODIATRIST

## 2021-02-26 PROCEDURE — 76060000036 HC ANESTHESIA 2.5 TO 3 HR: Performed by: PODIATRIST

## 2021-02-26 PROCEDURE — 77030040920 HC BN PTTY DBM ALLGRFT SKYE -C: Performed by: PODIATRIST

## 2021-02-26 PROCEDURE — 74011250636 HC RX REV CODE- 250/636: Performed by: NURSE ANESTHETIST, CERTIFIED REGISTERED

## 2021-02-26 PROCEDURE — 74011250636 HC RX REV CODE- 250/636: Performed by: PODIATRIST

## 2021-02-26 PROCEDURE — 77030020743 HC CAP PROTCT PIN BATR -A: Performed by: PODIATRIST

## 2021-02-26 PROCEDURE — C1769 GUIDE WIRE: HCPCS | Performed by: PODIATRIST

## 2021-02-26 PROCEDURE — 77030040922 HC BLNKT HYPOTHRM STRY -A

## 2021-02-26 PROCEDURE — 77030036687 HC SHOE PSTOP S2SG -A

## 2021-02-26 PROCEDURE — 2709999900 HC NON-CHARGEABLE SUPPLY: Performed by: PODIATRIST

## 2021-02-26 PROCEDURE — 76010000131 HC OR TIME 2 TO 2.5 HR: Performed by: PODIATRIST

## 2021-02-26 PROCEDURE — 77030031139 HC SUT VCRL2 J&J -A: Performed by: PODIATRIST

## 2021-02-26 PROCEDURE — 77030037717 HC BIT DRL SUT TAK KT -ARTH -D: Performed by: PODIATRIST

## 2021-02-26 PROCEDURE — C1713 ANCHOR/SCREW BN/BN,TIS/BN: HCPCS | Performed by: PODIATRIST

## 2021-02-26 PROCEDURE — 77030020274 HC MISC IMPL ORTHOPEDIC: Performed by: PODIATRIST

## 2021-02-26 PROCEDURE — 77030006788 HC BLD SAW OSC STRY -B: Performed by: PODIATRIST

## 2021-02-26 PROCEDURE — 74011000250 HC RX REV CODE- 250: Performed by: PODIATRIST

## 2021-02-26 PROCEDURE — 74011250636 HC RX REV CODE- 250/636: Performed by: ANESTHESIOLOGY

## 2021-02-26 DEVICE — ANCHOR SUTURE BIOCOMP 3X14.5 MM FIBERWIRE 2 TIGERWIRE: Type: IMPLANTABLE DEVICE | Site: FOOT | Status: FUNCTIONAL

## 2021-02-26 DEVICE — GUIDEWIRE 1.1X100MM SGL TROCAR
Type: IMPLANTABLE DEVICE | Site: FOOT | Status: FUNCTIONAL
Brand: VILEX GUIDEWIRE

## 2021-02-26 DEVICE — GRAFT BONE PUTTY SYRINGE DEMINERALIZED BONE MATRIX 1CC: Type: IMPLANTABLE DEVICE | Site: FOOT | Status: FUNCTIONAL

## 2021-02-26 RX ORDER — BUPIVACAINE HYDROCHLORIDE 5 MG/ML
INJECTION, SOLUTION EPIDURAL; INTRACAUDAL AS NEEDED
Status: DISCONTINUED | OUTPATIENT
Start: 2021-02-26 | End: 2021-02-26 | Stop reason: HOSPADM

## 2021-02-26 RX ORDER — MIDAZOLAM HYDROCHLORIDE 1 MG/ML
0.5 INJECTION, SOLUTION INTRAMUSCULAR; INTRAVENOUS
Status: DISCONTINUED | OUTPATIENT
Start: 2021-02-26 | End: 2021-02-26 | Stop reason: HOSPADM

## 2021-02-26 RX ORDER — MORPHINE SULFATE 10 MG/ML
2 INJECTION, SOLUTION INTRAMUSCULAR; INTRAVENOUS
Status: DISCONTINUED | OUTPATIENT
Start: 2021-02-26 | End: 2021-02-26 | Stop reason: HOSPADM

## 2021-02-26 RX ORDER — LIDOCAINE HYDROCHLORIDE 10 MG/ML
0.1 INJECTION, SOLUTION EPIDURAL; INFILTRATION; INTRACAUDAL; PERINEURAL AS NEEDED
Status: DISCONTINUED | OUTPATIENT
Start: 2021-02-26 | End: 2021-02-26 | Stop reason: HOSPADM

## 2021-02-26 RX ORDER — FENTANYL CITRATE 50 UG/ML
50 INJECTION, SOLUTION INTRAMUSCULAR; INTRAVENOUS AS NEEDED
Status: DISCONTINUED | OUTPATIENT
Start: 2021-02-26 | End: 2021-02-26 | Stop reason: HOSPADM

## 2021-02-26 RX ORDER — MIDAZOLAM HYDROCHLORIDE 1 MG/ML
1 INJECTION, SOLUTION INTRAMUSCULAR; INTRAVENOUS AS NEEDED
Status: DISCONTINUED | OUTPATIENT
Start: 2021-02-26 | End: 2021-02-26 | Stop reason: HOSPADM

## 2021-02-26 RX ORDER — SODIUM CHLORIDE, SODIUM LACTATE, POTASSIUM CHLORIDE, CALCIUM CHLORIDE 600; 310; 30; 20 MG/100ML; MG/100ML; MG/100ML; MG/100ML
100 INJECTION, SOLUTION INTRAVENOUS CONTINUOUS
Status: DISCONTINUED | OUTPATIENT
Start: 2021-02-26 | End: 2021-02-26 | Stop reason: HOSPADM

## 2021-02-26 RX ORDER — ACETAMINOPHEN 325 MG/1
650 TABLET ORAL ONCE
Status: COMPLETED | OUTPATIENT
Start: 2021-02-26 | End: 2021-02-26

## 2021-02-26 RX ORDER — SODIUM CHLORIDE 0.9 % (FLUSH) 0.9 %
5-40 SYRINGE (ML) INJECTION EVERY 8 HOURS
Status: DISCONTINUED | OUTPATIENT
Start: 2021-02-26 | End: 2021-02-26 | Stop reason: HOSPADM

## 2021-02-26 RX ORDER — ONDANSETRON 2 MG/ML
INJECTION INTRAMUSCULAR; INTRAVENOUS AS NEEDED
Status: DISCONTINUED | OUTPATIENT
Start: 2021-02-26 | End: 2021-02-26 | Stop reason: HOSPADM

## 2021-02-26 RX ORDER — SODIUM CHLORIDE 9 MG/ML
25 INJECTION, SOLUTION INTRAVENOUS CONTINUOUS
Status: DISCONTINUED | OUTPATIENT
Start: 2021-02-26 | End: 2021-02-26 | Stop reason: HOSPADM

## 2021-02-26 RX ORDER — PROPOFOL 10 MG/ML
INJECTION, EMULSION INTRAVENOUS AS NEEDED
Status: DISCONTINUED | OUTPATIENT
Start: 2021-02-26 | End: 2021-02-26 | Stop reason: HOSPADM

## 2021-02-26 RX ORDER — SODIUM CHLORIDE 0.9 % (FLUSH) 0.9 %
5-40 SYRINGE (ML) INJECTION AS NEEDED
Status: DISCONTINUED | OUTPATIENT
Start: 2021-02-26 | End: 2021-02-26 | Stop reason: HOSPADM

## 2021-02-26 RX ORDER — MIDAZOLAM HYDROCHLORIDE 1 MG/ML
INJECTION, SOLUTION INTRAMUSCULAR; INTRAVENOUS AS NEEDED
Status: DISCONTINUED | OUTPATIENT
Start: 2021-02-26 | End: 2021-02-26 | Stop reason: HOSPADM

## 2021-02-26 RX ORDER — CLINDAMYCIN PHOSPHATE 900 MG/50ML
900 INJECTION INTRAVENOUS ONCE
Status: COMPLETED | OUTPATIENT
Start: 2021-02-26 | End: 2021-02-26

## 2021-02-26 RX ORDER — PROPOFOL 10 MG/ML
INJECTION, EMULSION INTRAVENOUS
Status: DISCONTINUED | OUTPATIENT
Start: 2021-02-26 | End: 2021-02-26 | Stop reason: HOSPADM

## 2021-02-26 RX ORDER — FENTANYL CITRATE 50 UG/ML
25 INJECTION, SOLUTION INTRAMUSCULAR; INTRAVENOUS
Status: DISCONTINUED | OUTPATIENT
Start: 2021-02-26 | End: 2021-02-26 | Stop reason: HOSPADM

## 2021-02-26 RX ORDER — HYDROMORPHONE HYDROCHLORIDE 1 MG/ML
0.2 INJECTION, SOLUTION INTRAMUSCULAR; INTRAVENOUS; SUBCUTANEOUS
Status: DISCONTINUED | OUTPATIENT
Start: 2021-02-26 | End: 2021-02-26 | Stop reason: HOSPADM

## 2021-02-26 RX ORDER — ONDANSETRON 2 MG/ML
4 INJECTION INTRAMUSCULAR; INTRAVENOUS AS NEEDED
Status: DISCONTINUED | OUTPATIENT
Start: 2021-02-26 | End: 2021-02-26 | Stop reason: HOSPADM

## 2021-02-26 RX ORDER — DEXAMETHASONE SODIUM PHOSPHATE 4 MG/ML
INJECTION, SOLUTION INTRA-ARTICULAR; INTRALESIONAL; INTRAMUSCULAR; INTRAVENOUS; SOFT TISSUE AS NEEDED
Status: DISCONTINUED | OUTPATIENT
Start: 2021-02-26 | End: 2021-02-26 | Stop reason: HOSPADM

## 2021-02-26 RX ORDER — ROPIVACAINE HYDROCHLORIDE 5 MG/ML
30 INJECTION, SOLUTION EPIDURAL; INFILTRATION; PERINEURAL AS NEEDED
Status: DISCONTINUED | OUTPATIENT
Start: 2021-02-26 | End: 2021-02-26 | Stop reason: HOSPADM

## 2021-02-26 RX ORDER — SODIUM CHLORIDE, SODIUM LACTATE, POTASSIUM CHLORIDE, CALCIUM CHLORIDE 600; 310; 30; 20 MG/100ML; MG/100ML; MG/100ML; MG/100ML
25 INJECTION, SOLUTION INTRAVENOUS CONTINUOUS
Status: DISCONTINUED | OUTPATIENT
Start: 2021-02-26 | End: 2021-02-26 | Stop reason: HOSPADM

## 2021-02-26 RX ORDER — SODIUM CHLORIDE 9 MG/ML
INJECTION, SOLUTION INTRAVENOUS
Status: DISCONTINUED | OUTPATIENT
Start: 2021-02-26 | End: 2021-02-26 | Stop reason: HOSPADM

## 2021-02-26 RX ORDER — DIPHENHYDRAMINE HYDROCHLORIDE 50 MG/ML
12.5 INJECTION, SOLUTION INTRAMUSCULAR; INTRAVENOUS AS NEEDED
Status: DISCONTINUED | OUTPATIENT
Start: 2021-02-26 | End: 2021-02-26 | Stop reason: HOSPADM

## 2021-02-26 RX ORDER — OXYCODONE HYDROCHLORIDE 5 MG/1
5 TABLET ORAL AS NEEDED
Status: DISCONTINUED | OUTPATIENT
Start: 2021-02-26 | End: 2021-02-26 | Stop reason: HOSPADM

## 2021-02-26 RX ORDER — PHENYLEPHRINE HCL IN 0.9% NACL 0.4MG/10ML
SYRINGE (ML) INTRAVENOUS AS NEEDED
Status: DISCONTINUED | OUTPATIENT
Start: 2021-02-26 | End: 2021-02-26 | Stop reason: HOSPADM

## 2021-02-26 RX ADMIN — PROPOFOL 25 MG: 10 INJECTION, EMULSION INTRAVENOUS at 12:07

## 2021-02-26 RX ADMIN — ONDANSETRON HYDROCHLORIDE 4 MG: 2 INJECTION, SOLUTION INTRAMUSCULAR; INTRAVENOUS at 14:03

## 2021-02-26 RX ADMIN — PROPOFOL 25 MG: 10 INJECTION, EMULSION INTRAVENOUS at 12:10

## 2021-02-26 RX ADMIN — ACETAMINOPHEN 650 MG: 325 TABLET ORAL at 11:36

## 2021-02-26 RX ADMIN — PROPOFOL 25 MG: 10 INJECTION, EMULSION INTRAVENOUS at 11:55

## 2021-02-26 RX ADMIN — PROPOFOL 25 MG: 10 INJECTION, EMULSION INTRAVENOUS at 12:20

## 2021-02-26 RX ADMIN — DEXAMETHASONE SODIUM PHOSPHATE 8 MG: 4 INJECTION, SOLUTION INTRAMUSCULAR; INTRAVENOUS at 12:12

## 2021-02-26 RX ADMIN — PROPOFOL 50 MCG/KG/MIN: 10 INJECTION, EMULSION INTRAVENOUS at 12:04

## 2021-02-26 RX ADMIN — MIDAZOLAM HYDROCHLORIDE 2 MG: 1 INJECTION, SOLUTION INTRAMUSCULAR; INTRAVENOUS at 11:59

## 2021-02-26 RX ADMIN — PROPOFOL 25 MG: 10 INJECTION, EMULSION INTRAVENOUS at 12:05

## 2021-02-26 RX ADMIN — SODIUM CHLORIDE: 900 INJECTION, SOLUTION INTRAVENOUS at 12:56

## 2021-02-26 RX ADMIN — PROPOFOL 50 MG: 10 INJECTION, EMULSION INTRAVENOUS at 12:23

## 2021-02-26 RX ADMIN — PROPOFOL 25 MG: 10 INJECTION, EMULSION INTRAVENOUS at 11:59

## 2021-02-26 RX ADMIN — CLINDAMYCIN IN 5 PERCENT DEXTROSE 900 MG: 18 INJECTION, SOLUTION INTRAVENOUS at 11:52

## 2021-02-26 RX ADMIN — MIDAZOLAM HYDROCHLORIDE 2 MG: 1 INJECTION, SOLUTION INTRAMUSCULAR; INTRAVENOUS at 11:50

## 2021-02-26 RX ADMIN — MIDAZOLAM HYDROCHLORIDE 1 MG: 1 INJECTION, SOLUTION INTRAMUSCULAR; INTRAVENOUS at 12:08

## 2021-02-26 RX ADMIN — SODIUM CHLORIDE, POTASSIUM CHLORIDE, SODIUM LACTATE AND CALCIUM CHLORIDE 25 ML/HR: 600; 310; 30; 20 INJECTION, SOLUTION INTRAVENOUS at 11:47

## 2021-02-26 RX ADMIN — Medication 80 MCG: at 12:53

## 2021-02-26 RX ADMIN — MEPERIDINE HYDROCHLORIDE 25 MG: 50 INJECTION INTRAMUSCULAR; INTRAVENOUS; SUBCUTANEOUS at 11:58

## 2021-02-26 NOTE — BRIEF OP NOTE
Brief Postoperative Note    Patient: Fernando Fields  YOB: 1955  MRN: [id number]    Date of Procedure: 2/26/2021     Pre-Op Diagnosis: LEFT FOOT 5TH METATRALS FRACTURE    Post-Op Diagnosis: Left foot 5th metatarsal fracture and peroneal brevis tendon tear    Procedure(s):  LEFT 5TH OPEN REDUCTION INTERNAL FIXATION and peroneal brevis tendon repair    Surgeon(s):  Franklyn Reece DPM    Surgical Assistant: None    Anesthesia: MAC     Estimated Blood Loss (mL): Minimal    Complications: None    Specimens: * No specimens in log *     Implants:   Implant Name Type Inv. Item Serial No.  Lot No. LRB No. Used Action   GRAFT BONE PUTTY SYRINGE DEMINERALIZED BONE MATRIX 1CC - [id number]  GRAFT BONE PUTTY SYRINGE DEMINERALIZED BONE MATRIX 1CC [id number] [facility] [employer] N/A Left 1 Implanted   ANCHOR SUTURE BIOCOMP 3X14.5 MM FIBERWIRE 2 TIGERWIRE - SN/A  [facility] SUTURE BIOCOMP 3X14.5 MM FIBERWIRE 2 TIGERWIRE N/A [address] [employer]WD [id number] Left 1 Implanted   K WIRE FIX L100MM DIA1. 1MM SGL TRCR - SN/A  K WIRE FIX L100MM DIA1. 1MM SGL TRCR N/A [employer] N/A Left 2 Implanted       Drains: * No LDAs found *    Findings:  Patient tolerated procedure and anesthesia well and left the operating room with all vital signs stable and vascular intact left foot    Electronically Signed by Prema Nieves DPM on 2/26/2021 at 2:32 PM

## 2021-02-26 NOTE — DISCHARGE INSTRUCTIONS
PLEASE SEE ATTACHED DISCHARGE INSTRUCTIONS FROM  THE REHABILITATION [facility].     ______________________________________________________________________    Anesthesia Discharge Instructions    After general anesthesia or intervenous sedation, for 24 hours or while taking prescription Narcotics:  · Limit your activities  · Do not drive or operate hazardous machinery  · If you have not urinated within 8 hours after discharge, please contact your surgeon on call. · Do not make important personal or business decisions  · Do not drink alcoholic beverages    Report the following to your surgeon:  · Excessive pain, swelling, redness or odor of or around the surgical area  · Temperature over 100.5 degrees  · Nausea and vomiting lasting longer than 4 hours or if unable to take medication  · Any signs of decreased circulation or nerve impairment to extremity:  Change in color, persistent numbness, tingling, coldness or increased pain.   · Any questions

## 2021-02-26 NOTE — ANESTHESIA PREPROCEDURE EVALUATION
Relevant Problems   No relevant active problems       Anesthetic History   No history of anesthetic complications            Review of Systems / Medical History  Patient summary reviewed, nursing notes reviewed and pertinent labs reviewed    Pulmonary  Within defined limits                 Neuro/Psych   Within defined limits           Cardiovascular    Hypertension        Dysrhythmias : PVC  CAD         GI/Hepatic/Renal  Within defined limits              Endo/Other        Arthritis (RA)     Other Findings              Physical Exam    Airway  Mallampati: II  TM Distance: 4 - 6 cm  Neck ROM: normal range of motion   Mouth opening: Normal     Cardiovascular  Regular rate and rhythm,  S1 and S2 normal,  no murmur, click, rub, or gallop             Dental  No notable dental hx       Pulmonary  Breath sounds clear to auscultation               Abdominal  GI exam deferred       Other Findings            Anesthetic Plan    ASA: 2  Anesthesia type: MAC          Induction: Intravenous  Anesthetic plan and risks discussed with: Patient No

## 2021-02-26 NOTE — PERIOP NOTES
Discharge instructions and medications reviewed with patient and . Opportunity was given for patient and responsible party to ask questions. No further questions at this time. Responsible party and patient verbalizes understanding of discharge instructions. A Copy of discharge instructions given to patient. Patient discharged with all belongings. Patient states she already had follow up appt made and postop medications are filled and at home. Pt ambulated in PACU, pt going home with postop shoe (shoe is on) and ice.

## 2021-02-26 NOTE — ANESTHESIA POSTPROCEDURE EVALUATION
Post-Anesthesia Evaluation and Assessment    Patient: Trevor Marti MRN: [id number]  SSN: [id number]    YOB: 1955  Age: 76 y.o. Sex: female      I have evaluated the patient and they are stable and ready for discharge from the PACU. Cardiovascular Function/Vital Signs  Visit Vitals  /71   Pulse 82   Temp 36.6 °C (97.8 °F)   Resp 14   Ht 5' 6\" (1.676 m)   Wt 79.8 kg (176 lb)   SpO2 95%   BMI 28.41 kg/m²       Patient is status post MAC anesthesia for Procedure(s):  LEFT 5TH OPEN REDUCTION INTERNAL FIXATION. Nausea/Vomiting: None    Postoperative hydration reviewed and adequate. Pain:  Pain Scale 1: Numeric (0 - 10) (02/26/21 1420)  Pain Intensity 1: 0 (02/26/21 1420)   Managed    Neurological Status:   Neuro (WDL): Exceptions to WDL (02/26/21 1420)  Neuro  Neurologic State: Drowsy(easily arousable) (02/26/21 1420)  Orientation Level: Oriented X4 (02/26/21 1420)  Cognition: Follows commands (02/26/21 1420)  LUE Motor Response: Purposeful (02/26/21 1420)  LLE Motor Response: Purposeful (02/26/21 1420)  RUE Motor Response: Purposeful (02/26/21 1420)  RLE Motor Response: Purposeful (02/26/21 1420)   At baseline    Mental Status, Level of Consciousness: Alert and  oriented to person, place, and time    Pulmonary Status:   O2 Device: CO2 nasal cannula (02/26/21 1422)   Adequate oxygenation and airway patent    Complications related to anesthesia: None    Post-anesthesia assessment completed. No concerns    Signed By: Martha Crenshaw MD     February 26, 2021              Procedure(s):  LEFT 5TH OPEN REDUCTION INTERNAL FIXATION. MAC    <BSHSIANPOST>    INITIAL Post-op Vital signs:   Vitals Value Taken Time   /71 02/26/21 1440   Temp 36.6 °C (97.8 °F) 02/26/21 1422   Pulse 77 02/26/21 1444   Resp 12 02/26/21 1444   SpO2 94 % 02/26/21 1444   Vitals shown include unvalidated device data.

## 2021-02-27 NOTE — OP NOTES
[address]   OPERATIVE REPORT    Name:  Gabe Johnson  MR#:  [id number]  :  1955  ACCOUNT #:  [de-identified]  DATE OF SERVICE:  2021      PREOPERATIVE DIAGNOSIS:  Left foot fifth metatarsal fracture at the base. POSTOPERATIVE DIAGNOSES:  1. Left foot fifth metatarsal fracture at the base. 2.  Left foot peroneal brevis partial tear. PROCEDURES PERFORMED:  1. Left foot open reduction and internal fixation of fifth metatarsal base fracture with ( 2) -.332 K wires and application of bone putty for healing  2. Left foot peroneal brevis repair with Arthrex implant and suture. SURGEON:  Madi Zamora DPM    ASSISTANT:  No assist.    ANESTHESIA:  IV sedation with local.    COMPLICATIONS:  No complications. SPECIMENS REMOVED:  No pathology. IMPLANTS:  Two 0.045 K-wires to the fifth metatarsal and Arthrex implant with suture and fiberwire. ESTIMATED BLOOD LOSS:  Less than 10 mL. TOURNIQUET:  Left ankle tourniquet 250 mmHg. INDICATIONS FOR SURGERY:  Approximately 1 week ago the patient slipped on the ice and felt a crack and had pain instantly to the left foot. She came in the next day to the office and there was a big gapping of the fifth metatarsal, did get an MRI and there was over a 6 mm gapping of the base of the fracture at the fifth metatarsal and also related there was a slight longitudinal tear behind the malleolus of the peroneal.  The fracrture was in good position but still had a huge amount of gapping at the fracture site. Discussed with the patient that this would hopefully heal better if could go in and put fixation across the fracture because of being over 6 mm in gapping, because it would take quite a long time to heal over at least probably 4-6 months to feel better from a fracture like that. She agreed, she would like to have surgery to correct the problem.   And since the MRI revealed a split in the tendon casting should lock down the tendon and create stability vs fixing the tendon. She can not be non weight bearing and does not want to be. She does have RA and has been on multiple medications for it for years. And she is also on osteoporosis infusions which could cause problems with healing due to soft bone. PROCEDURE:  The patient was brought to the operating room, placed on the operating table in the supine position. 900 mg of clindamycin was given to the patient before the procedure started due to that she is allergic to Keflex and penicillin. Next, a 50:50 mixture of 0.5% Marcaine plain and 1% lidocaine plain 20 mL was injected around the fifth metatarsal base and all around the foot on the lateral side with cuboid for anesthesia. The left foot was then prepped and draped in the usual sterile manner and the left foot was elevated and exsanguinated with an Esmarch and the left ankle tourniquet elevated to 250 mmHg. Before the tourniquet was elevated, we did have the time-out. We all agreed this was the left foot and the patient and the procedure that we were doing. Next, using the FluoroScan before the procedure, actually before the tourniquet was elevated, did tasneem where the fifth metatarsal base fracture was and did a curvilinear style incision over the fracture area at the fifth metatarsal base and cuboid, did careful dissection down through the fifth metatarsal and was able to see the fracture piece which was already sticking out of the area and was already gapped. However, when cleaning off more with the tenotomy scissors and pulling back all the capsule and soft tissue, the peroneal brevis tendon could be identified and there was, I would say, about a third of the tendon that looked as if it had frayed or detached from the base of the bone.   The problem is the fifth metatarsal fracture had shifted and was more plantar flexed at that time and when examining the fifth metatarsal fracture, there was all blood clotting in the fracture site. This was all cleaned out, but when examining the fifth metatarsal base that was remaining there was a piece of bone that was not there. There was  probably 3-4 mm hole much more plantarly that looked like a piece of bone had been pulled off. So unfortunately, there was no bone to this one area of the fracture site and had a big gaping hole. Really thought about how to put this piece back because when moving the fracture piece around, the bone was very soft and osteoporotic.  was going to put a cannulated screw across the fracture piece, I was able to get it in good position up into the fifth base and as soon as I put a K-wire across the area, some of the bone completely disintegrated. I decided again and thought I could put a smaller 2.0 screw  fully-threaded versus cannulated but  tried to put a K-wire across the fracture site again, the bone just disintegrated again. so more and more that I kept trying to get any K-wire across the fracture to put it in position, the bone was so soft that it would not hold. Decided at this point, maybe just doing the K-wires would hold fixation enough to keep the bone in place and then I could use bone putty; however, still had to deal with the peroneal brevis tendon since the base of the fifth metatarsal was the place where the peroneal brevis attaches, knew I could not attach it to the fracture piece or it will fall apart again, so left small amount that was still on the fifth base there for protection and stability and then decided to use an Arthrex implant into the cuboid, so got the drill bit to make a hole to put the suture anchor in and the bone was extremely soft, could push right through the bone, was able to put the anchor which seemed to hold with the suture and did not come out, which was good.   Then took the rest of the peroneal brevis tendon and sutured it down with the FiberWire and the other suture in the set, and hopefully, this will stay and be stable and protect the lateral side of the foot. So far that actually looked good and stayed in place and did not come out. After doing all this, released the tourniquet , flushed the wound with sterile saline and then used bone putty at this point because bone putty would at least try to have this area heal due to the bone being so soft at the 5th metatarsal base. Used one 0.045 K-wire across the fracture site more plantar to dorsal  which held pretty solid, went through the skin and then through the fracture site and then put another one across almost parallel to the other fracture piece and then took a FluoroScan. The fracture piece was in good position. However, there was some gapping still but before I did do the K-wire fixations, I filled up the fracture spot and the hole from where the bone was missing all with bone putty and then put the K-wire fixations across, then put the remaining bone putty across the fracture site to hopefully heal that faster. Before all the bone putty was put, the wound was flushed with copious amounts of sterile saline and then any bleeding vessels were cauterized as necessary. The capsule was reapproximated with 3-0 Vicryl, the subcutaneous with 4-0 Vicryl and the skin with 4-0 Prolene in a subcuticular fashion and 4-0 nylon in a mattress fashion. Another 20 mL of 0.5% Marcaine plain was injected into the left foot for anesthesia, did do a sterile nerve block behind the ankle and then around the fracture site. So, the patient is much more comfortable later on. Then used dexamethasone in the subtalar joint; hopefully, that will help calm some of the swelling down and then used Adaptic, lots of 4 x 4's and really padded pins with Kerlix, cast padding and then Coban around the left foot. The patient tolerated the procedure and anesthesia well and left the operating room with all vital signs stable and vascular status intact to the left foot.   The patient is very worried about being nonweightbearing because she does not believe she can be with the rheumatoid and her back, hips, and knees. We will try to keep this healing with really casting it down and still have her walking as little as possible to keep the fracture in place. Did discuss with her daughter and  about the problems that ensued with the bone being extremely soft and then the tendon that had a tear, so hopefully this will heal and she can still put partial weight on the foot or use a crutch or walker which we will discuss on Monday when I see her. She will be resting, elevating, and icing all weekend and will take Aleve because she does much better with Aleve; 2 Aleves in the morning, 2 in the evening and will start clindamycin tomorrow. Does have the next 3-month appointment and will follow her back Monday and call her tonight.         Judah Dumont DPM      [id number]  D:  02/26/2021 14:54  T:  02/27/2021 3:33  JOB #:  [id number]  CC:  Cristobal Singer MD

## 2021-05-14 ENCOUNTER — HOSPITAL ENCOUNTER (EMERGENCY)
Age: 66
Discharge: HOME | End: 2021-05-14
Attending: EMERGENCY MEDICINE | Admitting: EMERGENCY MEDICINE
Payer: MEDICARE

## 2021-05-14 VITALS
BODY MASS INDEX: 28.28 KG/M2 | HEIGHT: 66 IN | SYSTOLIC BLOOD PRESSURE: 144 MMHG | HEART RATE: 71 BPM | WEIGHT: 176 LBS | OXYGEN SATURATION: 98 % | RESPIRATION RATE: 15 BRPM | DIASTOLIC BLOOD PRESSURE: 79 MMHG | TEMPERATURE: 97.9 F

## 2021-05-14 DIAGNOSIS — R42 DIZZINESS: Primary | ICD-10-CM

## 2021-05-14 LAB
ALBUMIN SERPL-MCNC: 3.9 G/DL (ref 3.5–5)
ALBUMIN/GLOB SERPL: 1 {RATIO} (ref 1.1–2.2)
ALP SERPL-CCNC: 96 U/L (ref 45–117)
ALT SERPL-CCNC: 25 U/L (ref 12–78)
ANION GAP SERPL CALC-SCNC: 6 MMOL/L (ref 5–15)
APPEARANCE UR: CLEAR
AST SERPL-CCNC: 15 U/L (ref 15–37)
BACTERIA URNS QL MICRO: NEGATIVE /HPF
BASOPHILS # BLD: 0.1 K/UL (ref 0–0.1)
BASOPHILS NFR BLD: 1 % (ref 0–1)
BILIRUB SERPL-MCNC: 0.4 MG/DL (ref 0.2–1)
BILIRUB UR QL: NEGATIVE
BUN SERPL-MCNC: 10 MG/DL (ref 6–20)
BUN/CREAT SERPL: 20 (ref 12–20)
CALCIUM SERPL-MCNC: 8.7 MG/DL (ref 8.5–10.1)
CHLORIDE SERPL-SCNC: 107 MMOL/L (ref 97–108)
CO2 SERPL-SCNC: 24 MMOL/L (ref 21–32)
COLOR UR: ABNORMAL
COMMENT, HOLDF: NORMAL
CREAT SERPL-MCNC: 0.51 MG/DL (ref 0.55–1.02)
DIFFERENTIAL METHOD BLD: ABNORMAL
EOSINOPHIL # BLD: 0.1 K/UL (ref 0–0.4)
EOSINOPHIL NFR BLD: 1 % (ref 0–7)
EPITH CASTS URNS QL MICRO: ABNORMAL /LPF
ERYTHROCYTE [DISTWIDTH] IN BLOOD BY AUTOMATED COUNT: 14.5 % (ref 11.5–14.5)
GLOBULIN SER CALC-MCNC: 3.8 G/DL (ref 2–4)
GLUCOSE SERPL-MCNC: 159 MG/DL (ref 65–100)
GLUCOSE UR STRIP.AUTO-MCNC: 100 MG/DL
HCT VFR BLD AUTO: 46.1 % (ref 35–47)
HGB BLD-MCNC: 15 G/DL (ref 11.5–16)
HGB UR QL STRIP: NEGATIVE
HYALINE CASTS URNS QL MICRO: ABNORMAL /LPF (ref 0–5)
IMM GRANULOCYTES # BLD AUTO: 0 K/UL (ref 0–0.04)
IMM GRANULOCYTES NFR BLD AUTO: 0 % (ref 0–0.5)
KETONES UR QL STRIP.AUTO: NEGATIVE MG/DL
LEUKOCYTE ESTERASE UR QL STRIP.AUTO: NEGATIVE
LYMPHOCYTES # BLD: 1.2 K/UL (ref 0.8–3.5)
LYMPHOCYTES NFR BLD: 14 % (ref 12–49)
MCH RBC QN AUTO: 29.9 PG (ref 26–34)
MCHC RBC AUTO-ENTMCNC: 32.5 G/DL (ref 30–36.5)
MCV RBC AUTO: 92 FL (ref 80–99)
MONOCYTES # BLD: 0.4 K/UL (ref 0–1)
MONOCYTES NFR BLD: 5 % (ref 5–13)
NEUTS SEG # BLD: 7.1 K/UL (ref 1.8–8)
NEUTS SEG NFR BLD: 79 % (ref 32–75)
NITRITE UR QL STRIP.AUTO: NEGATIVE
NRBC # BLD: 0 K/UL (ref 0–0.01)
NRBC BLD-RTO: 0 PER 100 WBC
PH UR STRIP: 7 [PH] (ref 5–8)
PLATELET # BLD AUTO: 267 K/UL (ref 150–400)
PMV BLD AUTO: 10.6 FL (ref 8.9–12.9)
POTASSIUM SERPL-SCNC: 3.5 MMOL/L (ref 3.5–5.1)
PROT SERPL-MCNC: 7.7 G/DL (ref 6.4–8.2)
PROT UR STRIP-MCNC: ABNORMAL MG/DL
RBC # BLD AUTO: 5.01 M/UL (ref 3.8–5.2)
RBC #/AREA URNS HPF: ABNORMAL /HPF (ref 0–5)
SAMPLES BEING HELD,HOLD: NORMAL
SODIUM SERPL-SCNC: 137 MMOL/L (ref 136–145)
SP GR UR REFRACTOMETRY: 1.02 (ref 1–1.03)
TROPONIN I SERPL-MCNC: <0.05 NG/ML
UR CULT HOLD, URHOLD: NORMAL
UROBILINOGEN UR QL STRIP.AUTO: 0.2 EU/DL (ref 0.2–1)
WBC # BLD AUTO: 8.9 K/UL (ref 3.6–11)
WBC URNS QL MICRO: ABNORMAL /HPF (ref 0–4)

## 2021-05-14 PROCEDURE — 93005 ELECTROCARDIOGRAM TRACING: CPT

## 2021-05-14 PROCEDURE — 96374 THER/PROPH/DIAG INJ IV PUSH: CPT

## 2021-05-14 PROCEDURE — 74011250636 HC RX REV CODE- 250/636: Performed by: EMERGENCY MEDICINE

## 2021-05-14 PROCEDURE — 74011250637 HC RX REV CODE- 250/637: Performed by: EMERGENCY MEDICINE

## 2021-05-14 PROCEDURE — 81001 URINALYSIS AUTO W/SCOPE: CPT

## 2021-05-14 PROCEDURE — 36415 COLL VENOUS BLD VENIPUNCTURE: CPT

## 2021-05-14 PROCEDURE — 85025 COMPLETE CBC W/AUTO DIFF WBC: CPT

## 2021-05-14 PROCEDURE — 84484 ASSAY OF TROPONIN QUANT: CPT

## 2021-05-14 PROCEDURE — 80053 COMPREHEN METABOLIC PANEL: CPT

## 2021-05-14 PROCEDURE — 99284 EMERGENCY DEPT VISIT MOD MDM: CPT

## 2021-05-14 RX ORDER — ONDANSETRON 2 MG/ML
4 INJECTION INTRAMUSCULAR; INTRAVENOUS
Status: COMPLETED | OUTPATIENT
Start: 2021-05-14 | End: 2021-05-14

## 2021-05-14 RX ORDER — ONDANSETRON 4 MG/1
4 TABLET, ORALLY DISINTEGRATING ORAL
Qty: 20 TAB | Refills: 0 | Status: SHIPPED | OUTPATIENT
Start: 2021-05-14 | End: 2021-09-21

## 2021-05-14 RX ORDER — ALPRAZOLAM 0.5 MG/1
0.25 TABLET ORAL
Status: COMPLETED | OUTPATIENT
Start: 2021-05-14 | End: 2021-05-14

## 2021-05-14 RX ORDER — MECLIZINE HCL 12.5 MG 12.5 MG/1
25 TABLET ORAL
Status: COMPLETED | OUTPATIENT
Start: 2021-05-14 | End: 2021-05-14

## 2021-05-14 RX ORDER — HYDROXYZINE 50 MG/1
50 TABLET, FILM COATED ORAL
Qty: 20 TAB | Refills: 0 | Status: SHIPPED | OUTPATIENT
Start: 2021-05-14 | End: 2021-05-24

## 2021-05-14 RX ORDER — ALPRAZOLAM 0.25 MG/1
0.25 TABLET ORAL
Qty: 15 TAB | Refills: 0 | Status: SHIPPED | OUTPATIENT
Start: 2021-05-14 | End: 2021-09-15

## 2021-05-14 RX ADMIN — ALPRAZOLAM 0.25 MG: 0.5 TABLET ORAL at 09:49

## 2021-05-14 RX ADMIN — ONDANSETRON 4 MG: 2 INJECTION INTRAMUSCULAR; INTRAVENOUS at 09:49

## 2021-05-14 RX ADMIN — SODIUM CHLORIDE 1000 ML: 9 INJECTION, SOLUTION INTRAVENOUS at 09:49

## 2021-05-14 RX ADMIN — MECLIZINE 25 MG: 12.5 TABLET ORAL at 09:49

## 2021-05-14 NOTE — ED TRIAGE NOTES
Pt arrives to triage via EMS. Pt reports being dx with inner ear problems 3 weeks ago and was scheduled for follow up today. Pt reports increased dizziness with nausea onset yesterday. Reports movement of head increases symptoms. Pt has eyes closed while in triage.

## 2021-05-14 NOTE — ED PROVIDER NOTES
HPI patient is a 74-year-old female with past medical history significant for Ménière's disease, hypertension, osteopenia, osteoporosis, premature ventricular contractions and rheumatoid arthritis who presents to the ED with abrupt onset of dizziness and nausea that started less than 1 day ago. She states that she feels like she slept well but this morning upon getting up felt like the room was moving. About 3 weeks ago she was treated with a Z-Franco for a sinus infection that affected her ears. Denies fever, cold symptoms, headache,neck pain, visual changes, focal weakness or rash. Denies any difficulty breathing, difficulty swallowing, SOB or chest pain. Denies any vomiting or diarrhea.  Pt. Reports that she has not had any food or medications today prior to arrival.        Past Medical History:   Diagnosis Date    Essential hypertension     Meniere's disease     Murmur     Osteopenia     Osteoporosis     PVC (premature ventricular contraction)     Rheumatoid arthritis ([facility] [address]        Past Surgical History:   Procedure Laterality Date    HX BREAST BIOPSY Left     benign stereo     HX CHOLECYSTECTOMY      HX GYN  1994    D&C          Family History:   Problem Relation Age of Onset    Heart Disease Mother     Hypertension Mother     Osteoporosis Mother     Heart Disease Father     Hypertension Father     Osteoporosis Father     Osteoporosis Sister     Breast Cancer Paternal Aunt         all in their 63's    Breast Cancer Paternal Aunt     Breast Cancer Paternal Aunt     Breast Cancer Paternal Aunt     Breast Cancer Paternal Aunt        Social History     Socioeconomic History    Marital status:      Spouse name: Not on file    Number of children: Not on file    Years of education: Not on file    Highest education level: Not on file   Occupational History    Not on file   Social Needs    Financial resource strain: Not on file    Food insecurity     Worry: Not on file     Inability: Not on file   [employer] needs     Medical: Not on file     Non-medical: Not on file   Tobacco Use    Smoking status: Never Smoker    Smokeless tobacco: Never Used   Substance and Sexual Activity    Alcohol use: No     Alcohol/week: 0.0 standard drinks    Drug use: No    Sexual activity: Not on file   Lifestyle    Physical activity     Days per week: Not on file     Minutes per session: Not on file    Stress: Not on file   Relationships    Social connections     Talks on phone: Not on file     Gets together: Not on file     Attends [employer] service: Not on file     Active member of club or organization: Not on file     Attends meetings of clubs or organizations: Not on file     Relationship status: Not on file    Intimate partner violence     Fear of current or ex partner: Not on file     Emotionally abused: Not on file     Physically abused: Not on file     Forced sexual activity: Not on file   Other Topics Concern    Not on file   Social History Narrative    Not on file         ALLERGIES: Cephalexin, Penicillins, and Sulfa (sulfonamide antibiotics)    Review of Systems   Constitutional: Negative for activity change, appetite change, fever and unexpected weight change. HENT: Negative for congestion, sore throat and trouble swallowing. Eyes: Negative for visual disturbance. Respiratory: Negative for cough and shortness of breath. Cardiovascular: Negative for chest pain, palpitations and leg swelling. Gastrointestinal: Positive for nausea. Negative for abdominal pain, diarrhea and vomiting. Genitourinary: Negative for difficulty urinating, dysuria, flank pain and frequency. Musculoskeletal: Negative for arthralgias, gait problem and myalgias. Skin: Negative for rash. Neurological: Positive for dizziness and light-headedness. Negative for headaches. All other systems reviewed and are negative.       Vitals:    05/14/21 0858 05/14/21 0912   BP: (!) 141/96 (!) 144/79   Pulse: 73 71 Resp: 16 15   Temp:  97.9 °F (36.6 °C)   SpO2: 96% 98%   Weight:  79.8 kg (176 lb)   Height:  5' 6\" (1.676 m)            Physical Exam  Vitals signs and nursing note reviewed. Constitutional:       General: She is not in acute distress. Appearance: Normal appearance. She is not ill-appearing, toxic-appearing or diaphoretic. Comments: White female, non-smoker,    HENT:      Head: Normocephalic. Right Ear: Tympanic membrane normal.      Left Ear: Tympanic membrane normal.      Mouth/Throat:      Mouth: Mucous membranes are moist.      Pharynx: No posterior oropharyngeal erythema. Neck:      Musculoskeletal: Normal range of motion and neck supple. No muscular tenderness. Cardiovascular:      Rate and Rhythm: Normal rate and regular rhythm. Pulmonary:      Effort: Pulmonary effort is normal.      Breath sounds: Normal breath sounds. Abdominal:      Tenderness: There is no abdominal tenderness. There is no guarding or rebound. Hernia: No hernia is present. Musculoskeletal: Normal range of motion. Right lower leg: No edema. Left lower leg: No edema. Lymphadenopathy:      Cervical: No cervical adenopathy. Skin:     General: Skin is warm and dry. Findings: No rash. Neurological:      Mental Status: She is alert.    Psychiatric:         Mood and Affect: Mood normal.         Behavior: Behavior normal.          [facility]  ED Course as of May 14 1224   Fri May 14, 2021   8935 EKG obtained at 857 showing normal sinus rhythm, rate 76, normal intervals, nonspecific T wave flattening in lead III and aVF    [JE]      ED Course User Index  [JE] Gunjan Tuttle MD       Procedures      Labs Reviewed   CBC WITH AUTOMATED DIFF - Abnormal; Notable for the following components:       Result Value    NEUTROPHILS 79 (*)     All other components within normal limits   METABOLIC PANEL, COMPREHENSIVE - Abnormal; Notable for the following components:    Glucose 159 (*)     Creatinine 0.51 (*)     A-G Ratio 1.0 (*)     All other components within normal limits   URINALYSIS W/ RFLX MICROSCOPIC - Abnormal; Notable for the following components:    Protein TRACE (*)     Glucose 100 (*)     All other components within normal limits   URINE CULTURE HOLD SAMPLE   SAMPLES BEING HELD   TROPONIN I   *UA&MICRO CHARGE BAT     Patient has been reexamined and reports slight relief from medications given. Suspect benign positional vertigo. Plan to discharge home on short course of Xanax 0.25 mg, Atarax and Zofran. Recommend close follow-up with PCP and/or ENT for reevaluation and further treatment. 12:29 PM  Patient's results and plan of care have been reviewed with the patient and her . Patient and/or family have verbally conveyed their understanding and agreement of the patient's signs, symptoms, diagnosis, treatment and prognosis and additionally agree to follow up as recommended or return to the Emergency Room should her condition change prior to follow-up. Discharge instructions have also been provided to the patient with some educational information regarding her diagnosis as well a list of reasons why she would want to return to the ER prior to her follow-up appointment should her condition change. Nicole Abdalla NP

## 2021-05-15 LAB
ATRIAL RATE: 76 BPM
CALCULATED P AXIS, ECG09: 28 DEGREES
CALCULATED R AXIS, ECG10: 3 DEGREES
CALCULATED T AXIS, ECG11: 5 DEGREES
DIAGNOSIS, 93000: NORMAL
P-R INTERVAL, ECG05: 132 MS
Q-T INTERVAL, ECG07: 416 MS
QRS DURATION, ECG06: 74 MS
QTC CALCULATION (BEZET), ECG08: 468 MS
VENTRICULAR RATE, ECG03: 76 BPM

## 2021-07-06 ENCOUNTER — TRANSCRIBE ORDER (OUTPATIENT)
Dept: SCHEDULING | Age: 66
End: 2021-07-06

## 2021-07-06 DIAGNOSIS — H92.12 DRAINAGE FROM LEFT EAR: Primary | ICD-10-CM

## 2021-07-09 ENCOUNTER — HOSPITAL ENCOUNTER (OUTPATIENT)
Dept: CT IMAGING | Age: 66
Discharge: HOME | End: 2021-07-09
Attending: OTOLARYNGOLOGY
Payer: MEDICARE

## 2021-07-09 DIAGNOSIS — H92.12 DRAINAGE FROM LEFT EAR: ICD-10-CM

## 2021-07-09 PROCEDURE — 70480 CT ORBIT/EAR/FOSSA W/O DYE: CPT

## 2021-07-10 NOTE — PROGRESS NOTES
[facility]  [id number]    HPI: Katerin Curry, a 76y.o. year-old who presents for follow up regarding CAD and PVCs. Katerin Curry who was evaluated through a synchronous (real-time) audio-video encounter, and/or her healthcare decision maker, is aware that it is a billable service, with coverage as determined by her insurance carrier. She provided verbal consent to proceed: Yes, and patient identification was verified. It was conducted pursuant to the emergency declaration under the [facility], [address] [place] [address] authority and the [employer] General Act. A caregiver was present when appropriate. Ability to conduct physical exam was limited. I was at the office. The patient was at home. Bp doing well despite all of her foot trouble. Repair on Friday then walking cast a few days, then recovery in about 4 weeks, wants to get back to walking and exercising. . had a flare of Meniere's and took the higher dose a month, then settled down after about a month. She seemed to tolerate that just fine. No edema, the broken ankle is wrapped. LDL 80 on her recent labs with Dr. David Gonzalez. April will be her rheum followup. So everything mostly doing fine except the foot     She denies any chest pain, her palpitations are well controlled  Says she has very rare palpitations and episodes are brief  She denies any dyspnea with exertion, PND or orthopnea  She denies dizziness or syncope  No LE edema   She continues to walk 5-10K steps/day, if the weather is bad outside she will ride a stationary bike for 30 minutes to an hour, staying very active except for the foot. Blood pressure well controlled at home   Prefers to see me once a year but gets labs with Dr. Mikaela Chen cv issues are stable, mild, for now  Reading a good sci-fi novel     Assessment/Plan:  1.   Palpitations - holter 6/15 ok with rare PACs/PVCs, not bothering her currently  2.  RA - on remicade infusions and MTX, on leukovorin, followed by Dr. Esteves  3.  HTN - well controlled on amlodipine and spironolactone, didn't want to try ACEi in the past due to the side effect of cough  4.  Hypokalemia - well controlled on KCL 20meq BID and spironolactone 12.5mg daily   5.  Meniere's disease - off maxzide now, watches salt intake  6.  Mild AI by TTE in 2/18 - will repeat TTE at her next visit   7.  Dyslipidemia - Lipids 12/18 - , TG 68, HDL 74, LDL 92, at that time she was advised to increase pravastatin to 40mg daily, LDL goal < 70 with CAD, could not tolerate it at that higher dose and cut it back down to 20 and doing much better now.   -LDL at 82 on most recent labs. Trying to watch her sweets. Reducing meats etc.   8.  CAD - calcium scan 105 in 5/15, continue ASA and pravastatin, asymptomatic, follow up in 1 year with stress echo and will check in on that.   9.  GERD - stable on PPI  10. 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    Past Surgical History:   Procedure Laterality Date   • HX BREAST BIOPSY Left     benign stereo    • HX CHOLECYSTECTOMY     • HX GYN  1994    D&C      Cardiovascular ROS: no chest pain or dyspnea on exertion, positive for palpitations  Respiratory ROS: no cough or wheezing  Neurological ROS: no TIA or stroke symptoms  All other systems negative except as  above.     PE  There were no vitals filed for this visit. There is no height or weight on file to calculate BMI. General appearance - alert, well appearing, and in no distress  Mental status - affect appropriate to mood  Eyes - sclera anicteric, moist mucous membranes  resp no wheezing, no congestion  Abdomen - soft, nontender, nondistended  Skin - normal coloration  no rashes    12 lead ECG: NSR     Recent Labs:  Lab Results   Component Value Date/Time    Cholesterol, total 165 04/15/2016 08:45 AM     No results found for: GALLITO  Lab Results   Component Value Date/Time    BUN 12 04/15/2016 08:45 AM     Lab Results   Component Value Date/Time    Potassium 3.8 04/15/2016 08:45 AM     No results found for: HBA1C, OPX1FMZA  No components found for: HGBI,  IHGB,  HGB,  HGBP,  WBHGB  No results found for: PLT, PLTEXT    Reviewed:  Past Medical History:   Diagnosis Date    Essential hypertension     Meniere's disease     Murmur     Osteopenia     Osteoporosis     PVC (premature ventricular contraction)     Rheumatoid arthritis ([facility] [address]      Social History     Tobacco Use   Smoking Status Never Smoker   Smokeless Tobacco Never Used     Social History     Substance and Sexual Activity   Alcohol Use No    Alcohol/week: 0.0 standard drinks     Allergies   Allergen Reactions    Cephalexin Rash    Penicillins Other (comments)     Chilld    Sulfa (Sulfonamide Antibiotics) Rash       Current Outpatient Medications   Medication Sig    pravastatin (PRAVACHOL) 20 mg tablet TAKE 1 TABLET BY MOUTH EVERY DAY    omeprazole (PRILOSEC) 10 mg capsule Take 10 mg by mouth daily.  spironolactone (ALDACTONE) 25 mg tablet TAKE ONE-HALF TABLET BY MOUTH ONCE DAILY    potassium chloride SR (KLOR-CON 10) 10 mEq tablet TAKE 2 TABLETS TWICE A DAY    KRILL OIL PO Take 1 Cap by mouth daily.  acetaminophen (TYLENOL EXTRA STRENGTH) 500 mg tablet Take 1,000 mg by mouth every six (6) hours as needed for Pain.     aspirin delayed-release 81 mg tablet Take  by mouth daily.  levothyroxine (SYNTHROID) 112 mcg tablet Take  by mouth Daily (before breakfast).  leucovorin calcium (WELLCOVORIN) 5 mg tablet Take  by mouth every seven (7) days.  methotrexate (RHEUMATREX) 2.5 mg tablet Take 2.5 mg by mouth.  amLODIPine (NORVASC) 5 mg tablet Take 5 mg by mouth daily.  inFLIXimab (REMICADE) 100 mg injection 5 mg/kg by IntraVENous route once. EVERY 8 WEEKS    zolpidem (AMBIEN) 10 mg tablet Take  by mouth nightly as needed for Sleep.  CETIRIZINE HCL (ZYRTEC PO) Take 20 mg by mouth.  calcium polycarbophil (FIBERCON) 625 mg tablet Take 625 mg by mouth daily.  cholecalciferol, vitamin D3, (VITAMIN D3) 2,000 unit tab Take  by mouth.  naproxen sodium (ALEVE) 220 mg cap Take  by mouth.  esomeprazole (NEXIUM) 20 mg capsule Take  by mouth daily. No current facility-administered medications for this visit.         North Shetty MD  [facility] and Vascular [place]  [address] [facility] [address]  [address] patient presents to the ED ambulatory co left sided facial droop, left sided facial numbness and tingling, and left sided arm weakness/slurred speech. onset of symptoms 45 minutes pta. pt not on blood thinners, hf of breast ca. dy berkowitz out to evaluate pt, code stroke activated as 2041, pt brought directly to ct scan. bgm 94 patient presents to the ED ambulatory co left sided facial droop, left sided facial numbness and tingling, and left sided arm weakness/slurred speech. onset of symptoms 45 minutes pta. pt not on blood thinners or ASA, hx of breast ca. dr berkowitz out to evaluate pt, code stroke activated as 2041, pt brought directly to ct scan. bgm 94

## 2021-09-15 ENCOUNTER — OFFICE VISIT (OUTPATIENT)
Dept: CARDIOLOGY CLINIC | Age: 66
End: 2021-09-15
Payer: MEDICARE

## 2021-09-15 VITALS
DIASTOLIC BLOOD PRESSURE: 82 MMHG | OXYGEN SATURATION: 97 % | WEIGHT: 172 LBS | HEART RATE: 88 BPM | HEIGHT: 66 IN | SYSTOLIC BLOOD PRESSURE: 134 MMHG | BODY MASS INDEX: 27.64 KG/M2

## 2021-09-15 DIAGNOSIS — I25.10 CORONARY ARTERY DISEASE INVOLVING NATIVE CORONARY ARTERY OF NATIVE HEART WITHOUT ANGINA PECTORIS: ICD-10-CM

## 2021-09-15 DIAGNOSIS — E78.5 DYSLIPIDEMIA: ICD-10-CM

## 2021-09-15 DIAGNOSIS — I35.1 NONRHEUMATIC AORTIC VALVE INSUFFICIENCY: ICD-10-CM

## 2021-09-15 DIAGNOSIS — Z01.818 PRE-OPERATIVE CLEARANCE: Primary | ICD-10-CM

## 2021-09-15 DIAGNOSIS — I10 ESSENTIAL HYPERTENSION: ICD-10-CM

## 2021-09-15 PROCEDURE — G8427 DOCREV CUR MEDS BY ELIG CLIN: HCPCS | Performed by: NURSE PRACTITIONER

## 2021-09-15 PROCEDURE — G8400 PT W/DXA NO RESULTS DOC: HCPCS | Performed by: NURSE PRACTITIONER

## 2021-09-15 PROCEDURE — G8432 DEP SCR NOT DOC, RNG: HCPCS | Performed by: NURSE PRACTITIONER

## 2021-09-15 PROCEDURE — G0463 HOSPITAL OUTPT CLINIC VISIT: HCPCS | Performed by: NURSE PRACTITIONER

## 2021-09-15 PROCEDURE — 93005 ELECTROCARDIOGRAM TRACING: CPT | Performed by: NURSE PRACTITIONER

## 2021-09-15 PROCEDURE — 1090F PRES/ABSN URINE INCON ASSESS: CPT | Performed by: NURSE PRACTITIONER

## 2021-09-15 PROCEDURE — 93010 ELECTROCARDIOGRAM REPORT: CPT | Performed by: NURSE PRACTITIONER

## 2021-09-15 PROCEDURE — G9899 SCRN MAM PERF RSLTS DOC: HCPCS | Performed by: NURSE PRACTITIONER

## 2021-09-15 PROCEDURE — 1101F PT FALLS ASSESS-DOCD LE1/YR: CPT | Performed by: NURSE PRACTITIONER

## 2021-09-15 PROCEDURE — G8419 CALC BMI OUT NRM PARAM NOF/U: HCPCS | Performed by: NURSE PRACTITIONER

## 2021-09-15 PROCEDURE — G8536 NO DOC ELDER MAL SCRN: HCPCS | Performed by: NURSE PRACTITIONER

## 2021-09-15 PROCEDURE — G8754 DIAS BP LESS 90: HCPCS | Performed by: NURSE PRACTITIONER

## 2021-09-15 PROCEDURE — 3017F COLORECTAL CA SCREEN DOC REV: CPT | Performed by: NURSE PRACTITIONER

## 2021-09-15 PROCEDURE — G8752 SYS BP LESS 140: HCPCS | Performed by: NURSE PRACTITIONER

## 2021-09-15 PROCEDURE — 99214 OFFICE O/P EST MOD 30 MIN: CPT | Performed by: NURSE PRACTITIONER

## 2021-09-15 NOTE — PROGRESS NOTES
[facility]  [phone] [id number]    HPI: Bj Ruth, a 76y.o. year-old who presents for follow up regarding CAD and PVCs. She is feeling well  No palpitations, no chest pain, no dyspnea with exertion  No PND or orthopnea  BP well controlled  Having ENT surgery and needs clearance  She denies dizziness or syncope  No LE edema   She continues to walk 5-10K steps/day or ride a stationary bike   Prefers to see Dr. Kerri Dominguez once a year but gets labs with Dr. Walter Durant  Reading a good sci-fi novel     Assessment/Plan:  1. Palpitations - holter 6/15 ok with rare PACs/PVCs, not bothering her currently  2. RA - on remicade infusions and MTX, on leukovorin, followed by Dr. Debra Hess  3. HTN - well controlled on amlodipine and spironolactone, didn't want to try ACEi in the past due to the side effect of cough  4. Hypokalemia - well controlled on KCL 20meq BID and spironolactone 12.5mg daily   5. Meniere's disease - off maxzide now, watches salt intake  6. Mild AI by TTE in 2/18 - will repeat TTE at her next visit   7. Dyslipidemia - Lipids 12/18 - , TG 68, HDL 74, LDL 92, at that time she was advised to increase pravastatin to 40mg daily, LDL goal < 70 with CAD, could not tolerate it at that higher dose and cut it back down to pravastatin 20mg daily, last LDL 82    8. CAD - calcium scan 105 in 5/15, continue ASA and pravastatin, asymptomatic, she plans to follow up with Dr. Kerri Dominguez again in February 2022, discussed repeating stress echo next year to assess for ischemia  9. GERD - stable on PPI  10. Hypothyroidism - on levothyroxine per PCP   11.   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 Past Surgical History:   Procedure Laterality Date    HX BREAST BIOPSY Left     benign stereo     HX CHOLECYSTECTOMY      HX GYN  1994    D&C      Cardiovascular ROS: no chest pain or dyspnea on exertion  Respiratory ROS: no cough or wheezing  Neurological ROS: no TIA or stroke symptoms  All other systems negative except as above. PE  Vitals:    09/15/21 1355   BP: 134/82   Pulse: 88   SpO2: 97%   Weight: 172 lb (78 kg)   Height: 5' 6\" (1.676 m)    Body mass index is 27.76 kg/m².     General appearance - alert, well appearing, and in no distress  Mental status - affect appropriate to mood  Eyes - sclera anicteric, moist mucous membranes  Neck - supple  Lymphatics - not assessed  Chest - clear to auscultation, no wheezes, rales or rhonchi  Heart - normal rate, regular rhythm, normal S1, S2, no murmurs, rubs, clicks or gallops  Abdomen - soft, nontender, nondistended  Back exam - full range of motion, no tenderness  Neurological - cranial nerves II through XII grossly intact, no focal deficit  Musculoskeletal - no muscular tenderness noted, normal strength  Extremities - peripheral pulses normal, no pedal edema  Skin - normal coloration  no rashes    12 lead ECG: NSR     Recent Labs:  Lab Results   Component Value Date/Time    Cholesterol, total 165 04/15/2016 08:45 AM     No results found for: GALLITO  Lab Results   Component Value Date/Time    BUN 10 05/14/2021 09:05 AM     Lab Results   Component Value Date/Time    Potassium 3.5 05/14/2021 09:05 AM     No results found for: HBA1C, NLE3ZONR  No components found for: HGBI,  IHGB,  HGB,  HGBP,  WBHGB  Lab Results   Component Value Date/Time    PLATELET 710 99/83/8850 09:05 AM       Reviewed:  Past Medical History:   Diagnosis Date    Essential hypertension     Meniere's disease     Murmur     Osteopenia     Osteoporosis     PVC (premature ventricular contraction)     Rheumatoid arthritis ([facility] [address]      Social History     Tobacco Use   Smoking Status Never Smoker   Smokeless Tobacco Never Used     Social History     Substance and Sexual Activity   Alcohol Use No    Alcohol/week: 0.0 standard drinks     Allergies   Allergen Reactions    Cephalexin Rash    Penicillins Other (comments)     Chilld    Sulfa (Sulfonamide Antibiotics) Rash       Current Outpatient Medications   Medication Sig    esomeprazole magnesium (NEXIUM PO) Take  by mouth.  meclizine HCl (MECLIZINE PO) Take  by mouth.  ondansetron (Zofran ODT) 4 mg disintegrating tablet 1 Tab by SubLINGual route every eight (8) hours as needed for Nausea or Vomiting.  pravastatin (PRAVACHOL) 20 mg tablet TAKE 1 TABLET BY MOUTH EVERY DAY    spironolactone (ALDACTONE) 25 mg tablet TAKE ONE-HALF TABLET BY MOUTH ONCE DAILY    potassium chloride SR (KLOR-CON 10) 10 mEq tablet TAKE 2 TABLETS TWICE A DAY    KRILL OIL PO Take 1 Cap by mouth daily.  acetaminophen (TYLENOL EXTRA STRENGTH) 500 mg tablet Take 1,000 mg by mouth every six (6) hours as needed for Pain.  aspirin delayed-release 81 mg tablet Take  by mouth daily.  levothyroxine (SYNTHROID) 112 mcg tablet Take 100 mcg by mouth Daily (before breakfast).  leucovorin calcium (WELLCOVORIN) 5 mg tablet Take  by mouth every seven (7) days.  methotrexate (RHEUMATREX) 2.5 mg tablet Take 2.5 mg by mouth.  amLODIPine (NORVASC) 5 mg tablet Take 5 mg by mouth daily.  inFLIXimab (REMICADE) 100 mg injection 5 mg/kg by IntraVENous route once.  EVERY 8 WEEKS    zolpidem (AMBIEN) 10 mg tablet Take by mouth nightly as needed for Sleep.  CETIRIZINE HCL (ZYRTEC PO) Take 20 mg by mouth.  calcium polycarbophil (FIBERCON) 625 mg tablet Take 625 mg by mouth daily.  cholecalciferol, vitamin D3, (VITAMIN D3) 2,000 unit tab Take  by mouth.  naproxen sodium (ALEVE) 220 mg cap Take  by mouth.  ALPRAZolam (Xanax) 0.25 mg tablet Take 1 Tab by mouth every eight (8) hours as needed for Anxiety. Max Daily Amount: 0.75 mg. (Patient not taking: Reported on 9/15/2021)    omeprazole (PRILOSEC) 10 mg capsule Take 10 mg by mouth daily. (Patient not taking: Reported on 9/15/2021)     No current facility-administered medications for this visit.        Cricket Juan NP  [facility] heart and Vascular [place]  [address], [address] [facility], [address] [place] [address]

## 2021-09-21 ENCOUNTER — HOSPITAL ENCOUNTER (OUTPATIENT)
Dept: PREADMISSION TESTING | Age: 66
Discharge: HOME | End: 2021-09-21
Payer: MEDICARE

## 2021-09-21 ENCOUNTER — HOSPITAL ENCOUNTER (OUTPATIENT)
Dept: GENERAL RADIOLOGY | Age: 66
Discharge: HOME | End: 2021-09-21
Attending: SPECIALIST
Payer: MEDICARE

## 2021-09-21 VITALS
BODY MASS INDEX: 27.78 KG/M2 | DIASTOLIC BLOOD PRESSURE: 81 MMHG | WEIGHT: 172.84 LBS | HEART RATE: 86 BPM | HEIGHT: 66 IN | SYSTOLIC BLOOD PRESSURE: 128 MMHG | TEMPERATURE: 98.1 F | RESPIRATION RATE: 20 BRPM

## 2021-09-21 LAB
ALBUMIN SERPL-MCNC: 3.1 G/DL (ref 3.5–5)
ALBUMIN/GLOB SERPL: 0.9 {RATIO} (ref 1.1–2.2)
ALP SERPL-CCNC: 100 U/L (ref 45–117)
ALT SERPL-CCNC: 20 U/L (ref 12–78)
ANION GAP SERPL CALC-SCNC: 8 MMOL/L (ref 5–15)
AST SERPL-CCNC: 23 U/L (ref 15–37)
BASOPHILS # BLD: 0.1 K/UL (ref 0–0.1)
BASOPHILS NFR BLD: 1 % (ref 0–1)
BILIRUB SERPL-MCNC: 0.3 MG/DL (ref 0.2–1)
BUN SERPL-MCNC: 16 MG/DL (ref 6–20)
BUN/CREAT SERPL: 36 (ref 12–20)
CALCIUM SERPL-MCNC: 9.1 MG/DL (ref 8.5–10.1)
CHLORIDE SERPL-SCNC: 105 MMOL/L (ref 97–108)
CO2 SERPL-SCNC: 25 MMOL/L (ref 21–32)
CREAT SERPL-MCNC: 0.44 MG/DL (ref 0.55–1.02)
DIFFERENTIAL METHOD BLD: ABNORMAL
EOSINOPHIL # BLD: 0.2 K/UL (ref 0–0.4)
EOSINOPHIL NFR BLD: 2 % (ref 0–7)
ERYTHROCYTE [DISTWIDTH] IN BLOOD BY AUTOMATED COUNT: 15.3 % (ref 11.5–14.5)
GLOBULIN SER CALC-MCNC: 3.4 G/DL (ref 2–4)
GLUCOSE SERPL-MCNC: 103 MG/DL (ref 65–100)
HCT VFR BLD AUTO: 39.6 % (ref 35–47)
HGB BLD-MCNC: 12.7 G/DL (ref 11.5–16)
IMM GRANULOCYTES # BLD AUTO: 0.1 K/UL (ref 0–0.04)
IMM GRANULOCYTES NFR BLD AUTO: 1 % (ref 0–0.5)
LYMPHOCYTES # BLD: 1.5 K/UL (ref 0.8–3.5)
LYMPHOCYTES NFR BLD: 19 % (ref 12–49)
MCH RBC QN AUTO: 29.3 PG (ref 26–34)
MCHC RBC AUTO-ENTMCNC: 32.1 G/DL (ref 30–36.5)
MCV RBC AUTO: 91.2 FL (ref 80–99)
MONOCYTES # BLD: 0.6 K/UL (ref 0–1)
MONOCYTES NFR BLD: 8 % (ref 5–13)
NEUTS SEG # BLD: 5.6 K/UL (ref 1.8–8)
NEUTS SEG NFR BLD: 69 % (ref 32–75)
NRBC # BLD: 0 K/UL (ref 0–0.01)
NRBC BLD-RTO: 0 PER 100 WBC
PLATELET # BLD AUTO: 392 K/UL (ref 150–400)
PMV BLD AUTO: 9.8 FL (ref 8.9–12.9)
POTASSIUM SERPL-SCNC: 4 MMOL/L (ref 3.5–5.1)
PROT SERPL-MCNC: 6.5 G/DL (ref 6.4–8.2)
RBC # BLD AUTO: 4.34 M/UL (ref 3.8–5.2)
SODIUM SERPL-SCNC: 138 MMOL/L (ref 136–145)
WBC # BLD AUTO: 8.1 K/UL (ref 3.6–11)

## 2021-09-21 PROCEDURE — 80053 COMPREHEN METABOLIC PANEL: CPT

## 2021-09-21 PROCEDURE — 71046 X-RAY EXAM CHEST 2 VIEWS: CPT

## 2021-09-21 PROCEDURE — 36415 COLL VENOUS BLD VENIPUNCTURE: CPT

## 2021-09-21 PROCEDURE — 85025 COMPLETE CBC W/AUTO DIFF WBC: CPT

## 2021-09-21 RX ORDER — DENOSUMAB 60 MG/ML
60 INJECTION SUBCUTANEOUS
COMMUNITY

## 2021-09-21 RX ORDER — LANOLIN ALCOHOL/MO/W.PET/CERES
5000 CREAM (GRAM) TOPICAL DAILY
COMMUNITY

## 2021-09-21 NOTE — PERIOP NOTES
Preoperative and medication instructions reviewed with patient , surgical site infection sheet given,   chg soap x 2 given and instructions on how to use chg soap correctly. Patient given opportunity to ask questions and all questions were answered. Information given regarding covid testing and arrival to hospital on day of surgery    COPY OF COVID CARD ON MEDICAL CHART    PATIENT GIVEN WRITTEN INSTRUCTIONS TO GO [place] THE [place]AGING [place] [address] [facility] [place] [address] [place].

## 2021-09-22 NOTE — PERIOP NOTES
Voice message left at [phone] and [phone] requesting a call back to set up appt for patient to return to [facility] for lab (CMP) re-draw due to specimen hemolyzing. WAITING FOR RETURN CALL.

## 2021-09-23 ENCOUNTER — HOSPITAL ENCOUNTER (OUTPATIENT)
Dept: PREADMISSION TESTING | Age: 66
Discharge: HOME | End: 2021-09-23
Payer: MEDICARE

## 2021-09-23 ENCOUNTER — HOSPITAL ENCOUNTER (OUTPATIENT)
Dept: PREADMISSION TESTING | Age: 66
Discharge: HOME | End: 2021-09-23

## 2021-09-23 ENCOUNTER — TRANSCRIBE ORDER (OUTPATIENT)
Dept: REGISTRATION | Age: 66
End: 2021-09-23

## 2021-09-23 DIAGNOSIS — Z01.812 PRE-PROCEDURE LAB EXAM: Primary | ICD-10-CM

## 2021-09-23 DIAGNOSIS — Z01.812 PRE-PROCEDURE LAB EXAM: ICD-10-CM

## 2021-09-23 LAB
ALBUMIN SERPL-MCNC: 3.1 G/DL (ref 3.5–5)
ALBUMIN/GLOB SERPL: 0.9 {RATIO} (ref 1.1–2.2)
ALP SERPL-CCNC: 108 U/L (ref 45–117)
ALT SERPL-CCNC: 20 U/L (ref 12–78)
ANION GAP SERPL CALC-SCNC: 5 MMOL/L (ref 5–15)
AST SERPL-CCNC: 12 U/L (ref 15–37)
BILIRUB SERPL-MCNC: 0.3 MG/DL (ref 0.2–1)
BUN SERPL-MCNC: 16 MG/DL (ref 6–20)
BUN/CREAT SERPL: 24 (ref 12–20)
CALCIUM SERPL-MCNC: 9.9 MG/DL (ref 8.5–10.1)
CHLORIDE SERPL-SCNC: 103 MMOL/L (ref 97–108)
CO2 SERPL-SCNC: 29 MMOL/L (ref 21–32)
CREAT SERPL-MCNC: 0.68 MG/DL (ref 0.55–1.02)
GLOBULIN SER CALC-MCNC: 3.6 G/DL (ref 2–4)
GLUCOSE SERPL-MCNC: 95 MG/DL (ref 65–100)
POTASSIUM SERPL-SCNC: 3.9 MMOL/L (ref 3.5–5.1)
PROT SERPL-MCNC: 6.7 G/DL (ref 6.4–8.2)
SODIUM SERPL-SCNC: 137 MMOL/L (ref 136–145)

## 2021-09-23 PROCEDURE — 80053 COMPREHEN METABOLIC PANEL: CPT

## 2021-09-23 PROCEDURE — U0005 INFEC AGEN DETEC AMPLI PROBE: HCPCS

## 2021-09-24 LAB
SARS-COV-2, XPLCVT: NOT DETECTED
SOURCE, COVRS: NORMAL

## 2021-09-28 ENCOUNTER — ANESTHESIA (OUTPATIENT)
Dept: SURGERY | Age: 66
End: 2021-09-28
Payer: MEDICARE

## 2021-09-28 ENCOUNTER — HOSPITAL ENCOUNTER (OUTPATIENT)
Age: 66
Setting detail: OUTPATIENT SURGERY
Discharge: HOME | End: 2021-09-28
Attending: SPECIALIST | Admitting: SPECIALIST
Payer: MEDICARE

## 2021-09-28 ENCOUNTER — ANESTHESIA EVENT (OUTPATIENT)
Dept: SURGERY | Age: 66
End: 2021-09-28
Payer: MEDICARE

## 2021-09-28 VITALS
HEART RATE: 96 BPM | DIASTOLIC BLOOD PRESSURE: 74 MMHG | RESPIRATION RATE: 19 BRPM | SYSTOLIC BLOOD PRESSURE: 106 MMHG | TEMPERATURE: 98 F | OXYGEN SATURATION: 94 %

## 2021-09-28 DIAGNOSIS — L76.82 PAIN AT SURGICAL INCISION: Primary | ICD-10-CM

## 2021-09-28 PROCEDURE — 74011250636 HC RX REV CODE- 250/636: Performed by: ANESTHESIOLOGY

## 2021-09-28 PROCEDURE — 74011250636 HC RX REV CODE- 250/636: Performed by: SPECIALIST

## 2021-09-28 PROCEDURE — 77030004435 HC BUR RND STRY -C: Performed by: SPECIALIST

## 2021-09-28 PROCEDURE — 76060000036 HC ANESTHESIA 2.5 TO 3 HR: Performed by: SPECIALIST

## 2021-09-28 PROCEDURE — 77030040356 HC CORD BPLR FRCP COVD -A: Performed by: SPECIALIST

## 2021-09-28 PROCEDURE — 74011250637 HC RX REV CODE- 250/637: Performed by: ANESTHESIOLOGY

## 2021-09-28 PROCEDURE — L8613 OSSICULAR IMPLANT: HCPCS | Performed by: SPECIALIST

## 2021-09-28 PROCEDURE — 74011250636 HC RX REV CODE- 250/636: Performed by: NURSE ANESTHETIST, CERTIFIED REGISTERED

## 2021-09-28 PROCEDURE — 2709999900 HC NON-CHARGEABLE SUPPLY: Performed by: SPECIALIST

## 2021-09-28 PROCEDURE — 77030029099 HC BN WAX SSPC -A: Performed by: SPECIALIST

## 2021-09-28 PROCEDURE — 77030031139 HC SUT VCRL2 J&J -A: Performed by: SPECIALIST

## 2021-09-28 PROCEDURE — 76010000132 HC OR TIME 2.5 TO 3 HR: Performed by: SPECIALIST

## 2021-09-28 PROCEDURE — 77030011267 HC ELECTRD BLD COVD -A: Performed by: SPECIALIST

## 2021-09-28 PROCEDURE — 74011000258 HC RX REV CODE- 258: Performed by: NURSE ANESTHETIST, CERTIFIED REGISTERED

## 2021-09-28 PROCEDURE — 77030008570 HC TBNG SUC IRR GRAC -B: Performed by: SPECIALIST

## 2021-09-28 PROCEDURE — 88304 TISSUE EXAM BY PATHOLOGIST: CPT

## 2021-09-28 PROCEDURE — 77030026438 HC STYL ET INTUB CARD -A: Performed by: ANESTHESIOLOGY

## 2021-09-28 PROCEDURE — 77030014006 HC SPNG HEMSTAT J&J -A: Performed by: SPECIALIST

## 2021-09-28 PROCEDURE — 88331 PATH CONSLTJ SURG 1 BLK 1SPC: CPT

## 2021-09-28 PROCEDURE — 88312 SPECIAL STAINS GROUP 1: CPT

## 2021-09-28 PROCEDURE — 74011000250 HC RX REV CODE- 250: Performed by: SPECIALIST

## 2021-09-28 PROCEDURE — 74011250637 HC RX REV CODE- 250/637: Performed by: SPECIALIST

## 2021-09-28 PROCEDURE — 77030040361 HC SLV COMPR DVT MDII -B: Performed by: SPECIALIST

## 2021-09-28 PROCEDURE — 76210000016 HC OR PH I REC 1 TO 1.5 HR: Performed by: SPECIALIST

## 2021-09-28 PROCEDURE — 77030008684 HC TU ET CUF COVD -B: Performed by: ANESTHESIOLOGY

## 2021-09-28 PROCEDURE — 88311 DECALCIFY TISSUE: CPT

## 2021-09-28 PROCEDURE — 77030006689 HC BLD OPHTH BVR BD -A: Performed by: SPECIALIST

## 2021-09-28 PROCEDURE — 77030010507 HC ADH SKN DERMBND J&J -B: Performed by: SPECIALIST

## 2021-09-28 PROCEDURE — 74011000250 HC RX REV CODE- 250: Performed by: NURSE ANESTHETIST, CERTIFIED REGISTERED

## 2021-09-28 PROCEDURE — 77030006932 HC BLD TYMP BVR BD -B: Performed by: SPECIALIST

## 2021-09-28 PROCEDURE — 77030019615 HC ELCTRD EMG NDL MEDT -B: Performed by: SPECIALIST

## 2021-09-28 PROCEDURE — 77030019655 HC PRB STIM CRAN MEDT -B: Performed by: SPECIALIST

## 2021-09-28 PROCEDURE — 88305 TISSUE EXAM BY PATHOLOGIST: CPT

## 2021-09-28 PROCEDURE — 77030031159 HC TU EAR TRIUNE GRAC -B: Performed by: SPECIALIST

## 2021-09-28 DEVICE — OFFSET ALTO TOTAL SIZERS INCLUDED TITANIUM/SILICONE
Type: IMPLANTABLE DEVICE | Site: EAR | Status: FUNCTIONAL
Brand: OFFSET ALTO TOTAL

## 2021-09-28 RX ORDER — SODIUM CHLORIDE 0.9 % (FLUSH) 0.9 %
5-40 SYRINGE (ML) INJECTION AS NEEDED
Status: DISCONTINUED | OUTPATIENT
Start: 2021-09-28 | End: 2021-09-28 | Stop reason: HOSPADM

## 2021-09-28 RX ORDER — SODIUM CHLORIDE 9 MG/ML
25 INJECTION, SOLUTION INTRAVENOUS CONTINUOUS
Status: DISCONTINUED | OUTPATIENT
Start: 2021-09-28 | End: 2021-09-28 | Stop reason: HOSPADM

## 2021-09-28 RX ORDER — PROPOFOL 10 MG/ML
INJECTION, EMULSION INTRAVENOUS AS NEEDED
Status: DISCONTINUED | OUTPATIENT
Start: 2021-09-28 | End: 2021-09-28 | Stop reason: HOSPADM

## 2021-09-28 RX ORDER — OFLOXACIN 3 MG/ML
5 SOLUTION AURICULAR (OTIC) 2 TIMES DAILY
Qty: 10 ML | Refills: 3 | Status: SHIPPED | OUTPATIENT
Start: 2021-09-28 | End: 2022-05-20

## 2021-09-28 RX ORDER — FLUCONAZOLE 150 MG/1
150 TABLET ORAL AS NEEDED
Qty: 1 TABLET | Refills: 1 | Status: SHIPPED | OUTPATIENT
Start: 2021-09-28 | End: 2022-05-20

## 2021-09-28 RX ORDER — HYDROMORPHONE HYDROCHLORIDE 1 MG/ML
0.2 INJECTION, SOLUTION INTRAMUSCULAR; INTRAVENOUS; SUBCUTANEOUS
Status: DISCONTINUED | OUTPATIENT
Start: 2021-09-28 | End: 2021-09-28 | Stop reason: HOSPADM

## 2021-09-28 RX ORDER — LIDOCAINE HYDROCHLORIDE 10 MG/ML
0.1 INJECTION, SOLUTION EPIDURAL; INFILTRATION; INTRACAUDAL; PERINEURAL AS NEEDED
Status: DISCONTINUED | OUTPATIENT
Start: 2021-09-28 | End: 2021-09-28 | Stop reason: HOSPADM

## 2021-09-28 RX ORDER — MIDAZOLAM HYDROCHLORIDE 1 MG/ML
1 INJECTION, SOLUTION INTRAMUSCULAR; INTRAVENOUS AS NEEDED
Status: DISCONTINUED | OUTPATIENT
Start: 2021-09-28 | End: 2021-09-28 | Stop reason: HOSPADM

## 2021-09-28 RX ORDER — NYSTATIN 100000 [USP'U]/ML
500000 SUSPENSION ORAL 4 TIMES DAILY
Qty: 500 ML | Refills: 0 | Status: SHIPPED | OUTPATIENT
Start: 2021-09-28 | End: 2022-05-20

## 2021-09-28 RX ORDER — DEXAMETHASONE SODIUM PHOSPHATE 4 MG/ML
INJECTION, SOLUTION INTRA-ARTICULAR; INTRALESIONAL; INTRAMUSCULAR; INTRAVENOUS; SOFT TISSUE AS NEEDED
Status: DISCONTINUED | OUTPATIENT
Start: 2021-09-28 | End: 2021-09-28 | Stop reason: HOSPADM

## 2021-09-28 RX ORDER — DIPHENHYDRAMINE HYDROCHLORIDE 50 MG/ML
12.5 INJECTION, SOLUTION INTRAMUSCULAR; INTRAVENOUS AS NEEDED
Status: DISCONTINUED | OUTPATIENT
Start: 2021-09-28 | End: 2021-09-28 | Stop reason: HOSPADM

## 2021-09-28 RX ORDER — BACITRACIN 500 [USP'U]/G
OINTMENT TOPICAL
Status: DISCONTINUED | OUTPATIENT
Start: 2021-09-28 | End: 2021-09-28 | Stop reason: HOSPADM

## 2021-09-28 RX ORDER — SUCCINYLCHOLINE CHLORIDE 20 MG/ML
INJECTION INTRAMUSCULAR; INTRAVENOUS AS NEEDED
Status: DISCONTINUED | OUTPATIENT
Start: 2021-09-28 | End: 2021-09-28 | Stop reason: HOSPADM

## 2021-09-28 RX ORDER — SODIUM CHLORIDE, SODIUM LACTATE, POTASSIUM CHLORIDE, CALCIUM CHLORIDE 600; 310; 30; 20 MG/100ML; MG/100ML; MG/100ML; MG/100ML
125 INJECTION, SOLUTION INTRAVENOUS CONTINUOUS
Status: DISCONTINUED | OUTPATIENT
Start: 2021-09-28 | End: 2021-09-28 | Stop reason: HOSPADM

## 2021-09-28 RX ORDER — HYDROCODONE BITARTRATE AND ACETAMINOPHEN 5; 325 MG/1; MG/1
1-2 TABLET ORAL
Qty: 20 TABLET | Refills: 0 | Status: SHIPPED | OUTPATIENT
Start: 2021-09-28 | End: 2021-10-03

## 2021-09-28 RX ORDER — DEXAMETHASONE SODIUM PHOSPHATE 10 MG/ML
10 INJECTION INTRAMUSCULAR; INTRAVENOUS ONCE
Status: DISCONTINUED | OUTPATIENT
Start: 2021-09-28 | End: 2021-09-28 | Stop reason: HOSPADM

## 2021-09-28 RX ORDER — CLINDAMYCIN HYDROCHLORIDE 300 MG/1
300 CAPSULE ORAL 3 TIMES DAILY
Qty: 15 CAPSULE | Refills: 0 | Status: SHIPPED | OUTPATIENT
Start: 2021-09-28 | End: 2021-10-03

## 2021-09-28 RX ORDER — PHENYLEPHRINE HCL IN 0.9% NACL 0.4MG/10ML
SYRINGE (ML) INTRAVENOUS
Status: DISCONTINUED | OUTPATIENT
Start: 2021-09-28 | End: 2021-09-28 | Stop reason: HOSPADM

## 2021-09-28 RX ORDER — SODIUM CHLORIDE 0.9 % (FLUSH) 0.9 %
5-40 SYRINGE (ML) INJECTION EVERY 8 HOURS
Status: DISCONTINUED | OUTPATIENT
Start: 2021-09-28 | End: 2021-09-28 | Stop reason: HOSPADM

## 2021-09-28 RX ORDER — OFLOXACIN 3 MG/ML
SOLUTION AURICULAR (OTIC) AS NEEDED
Status: DISCONTINUED | OUTPATIENT
Start: 2021-09-28 | End: 2021-09-28 | Stop reason: HOSPADM

## 2021-09-28 RX ORDER — ONDANSETRON 2 MG/ML
INJECTION INTRAMUSCULAR; INTRAVENOUS AS NEEDED
Status: DISCONTINUED | OUTPATIENT
Start: 2021-09-28 | End: 2021-09-28 | Stop reason: HOSPADM

## 2021-09-28 RX ORDER — ROCURONIUM BROMIDE 10 MG/ML
INJECTION, SOLUTION INTRAVENOUS AS NEEDED
Status: DISCONTINUED | OUTPATIENT
Start: 2021-09-28 | End: 2021-09-28 | Stop reason: HOSPADM

## 2021-09-28 RX ORDER — PROPOFOL 10 MG/ML
INJECTION, EMULSION INTRAVENOUS
Status: DISCONTINUED | OUTPATIENT
Start: 2021-09-28 | End: 2021-09-28 | Stop reason: HOSPADM

## 2021-09-28 RX ORDER — OXYCODONE HYDROCHLORIDE 5 MG/1
5 TABLET ORAL AS NEEDED
Status: DISCONTINUED | OUTPATIENT
Start: 2021-09-28 | End: 2021-09-28 | Stop reason: HOSPADM

## 2021-09-28 RX ORDER — ONDANSETRON 4 MG/1
4 TABLET, ORALLY DISINTEGRATING ORAL
Qty: 15 TABLET | Refills: 0 | Status: SHIPPED | OUTPATIENT
Start: 2021-09-28 | End: 2022-05-20

## 2021-09-28 RX ORDER — FENTANYL CITRATE 50 UG/ML
50 INJECTION, SOLUTION INTRAMUSCULAR; INTRAVENOUS AS NEEDED
Status: DISCONTINUED | OUTPATIENT
Start: 2021-09-28 | End: 2021-09-28 | Stop reason: HOSPADM

## 2021-09-28 RX ORDER — MORPHINE SULFATE 2 MG/ML
2 INJECTION, SOLUTION INTRAMUSCULAR; INTRAVENOUS
Status: DISCONTINUED | OUTPATIENT
Start: 2021-09-28 | End: 2021-09-28 | Stop reason: HOSPADM

## 2021-09-28 RX ORDER — ONDANSETRON 2 MG/ML
4 INJECTION INTRAMUSCULAR; INTRAVENOUS AS NEEDED
Status: DISCONTINUED | OUTPATIENT
Start: 2021-09-28 | End: 2021-09-28 | Stop reason: HOSPADM

## 2021-09-28 RX ORDER — SODIUM CHLORIDE, SODIUM LACTATE, POTASSIUM CHLORIDE, CALCIUM CHLORIDE 600; 310; 30; 20 MG/100ML; MG/100ML; MG/100ML; MG/100ML
25 INJECTION, SOLUTION INTRAVENOUS CONTINUOUS
Status: DISCONTINUED | OUTPATIENT
Start: 2021-09-28 | End: 2021-09-28 | Stop reason: HOSPADM

## 2021-09-28 RX ORDER — LIDOCAINE HYDROCHLORIDE AND EPINEPHRINE 10; 10 MG/ML; UG/ML
1.5 INJECTION, SOLUTION INFILTRATION; PERINEURAL ONCE
Status: DISCONTINUED | OUTPATIENT
Start: 2021-09-28 | End: 2021-09-28 | Stop reason: HOSPADM

## 2021-09-28 RX ORDER — ACETAMINOPHEN 325 MG/1
650 TABLET ORAL ONCE
Status: COMPLETED | OUTPATIENT
Start: 2021-09-28 | End: 2021-09-28

## 2021-09-28 RX ORDER — FENTANYL CITRATE 50 UG/ML
25 INJECTION, SOLUTION INTRAMUSCULAR; INTRAVENOUS
Status: DISCONTINUED | OUTPATIENT
Start: 2021-09-28 | End: 2021-09-28 | Stop reason: HOSPADM

## 2021-09-28 RX ORDER — SODIUM CHLORIDE, SODIUM LACTATE, POTASSIUM CHLORIDE, CALCIUM CHLORIDE 600; 310; 30; 20 MG/100ML; MG/100ML; MG/100ML; MG/100ML
INJECTION, SOLUTION INTRAVENOUS
Status: DISCONTINUED | OUTPATIENT
Start: 2021-09-28 | End: 2021-09-28 | Stop reason: HOSPADM

## 2021-09-28 RX ORDER — LIDOCAINE HYDROCHLORIDE AND EPINEPHRINE 20; 10 MG/ML; UG/ML
INJECTION, SOLUTION INFILTRATION; PERINEURAL AS NEEDED
Status: DISCONTINUED | OUTPATIENT
Start: 2021-09-28 | End: 2021-09-28 | Stop reason: HOSPADM

## 2021-09-28 RX ORDER — FENTANYL CITRATE 50 UG/ML
INJECTION, SOLUTION INTRAMUSCULAR; INTRAVENOUS AS NEEDED
Status: DISCONTINUED | OUTPATIENT
Start: 2021-09-28 | End: 2021-09-28 | Stop reason: HOSPADM

## 2021-09-28 RX ORDER — LIDOCAINE HYDROCHLORIDE 20 MG/ML
INJECTION, SOLUTION EPIDURAL; INFILTRATION; INTRACAUDAL; PERINEURAL AS NEEDED
Status: DISCONTINUED | OUTPATIENT
Start: 2021-09-28 | End: 2021-09-28 | Stop reason: HOSPADM

## 2021-09-28 RX ORDER — CLINDAMYCIN PHOSPHATE 900 MG/50ML
900 INJECTION INTRAVENOUS ONCE
Status: COMPLETED | OUTPATIENT
Start: 2021-09-28 | End: 2021-09-28

## 2021-09-28 RX ORDER — MIDAZOLAM HYDROCHLORIDE 1 MG/ML
0.5 INJECTION, SOLUTION INTRAMUSCULAR; INTRAVENOUS
Status: DISCONTINUED | OUTPATIENT
Start: 2021-09-28 | End: 2021-09-28 | Stop reason: HOSPADM

## 2021-09-28 RX ORDER — KETAMINE HYDROCHLORIDE 10 MG/ML
INJECTION, SOLUTION INTRAMUSCULAR; INTRAVENOUS AS NEEDED
Status: DISCONTINUED | OUTPATIENT
Start: 2021-09-28 | End: 2021-09-28 | Stop reason: HOSPADM

## 2021-09-28 RX ADMIN — LIDOCAINE HYDROCHLORIDE 80 MG: 20 INJECTION, SOLUTION EPIDURAL; INFILTRATION; INTRACAUDAL; PERINEURAL at 10:17

## 2021-09-28 RX ADMIN — DEXAMETHASONE SODIUM PHOSPHATE 8 MG: 4 INJECTION, SOLUTION INTRAMUSCULAR; INTRAVENOUS at 10:21

## 2021-09-28 RX ADMIN — DEXMEDETOMIDINE HYDROCHLORIDE 8 MCG: 100 INJECTION, SOLUTION, CONCENTRATE INTRAVENOUS at 10:20

## 2021-09-28 RX ADMIN — FENTANYL CITRATE 25 MCG: 50 INJECTION, SOLUTION INTRAMUSCULAR; INTRAVENOUS at 10:37

## 2021-09-28 RX ADMIN — FENTANYL CITRATE 50 MCG: 50 INJECTION, SOLUTION INTRAMUSCULAR; INTRAVENOUS at 10:10

## 2021-09-28 RX ADMIN — SODIUM CHLORIDE, POTASSIUM CHLORIDE, SODIUM LACTATE AND CALCIUM CHLORIDE 25 ML/HR: 600; 310; 30; 20 INJECTION, SOLUTION INTRAVENOUS at 10:00

## 2021-09-28 RX ADMIN — ACETAMINOPHEN 650 MG: 325 TABLET ORAL at 10:00

## 2021-09-28 RX ADMIN — PROPOFOL 200 MG: 10 INJECTION, EMULSION INTRAVENOUS at 10:17

## 2021-09-28 RX ADMIN — MIDAZOLAM 0.5 MG: 1 INJECTION INTRAMUSCULAR; INTRAVENOUS at 13:10

## 2021-09-28 RX ADMIN — ONDANSETRON HYDROCHLORIDE 4 MG: 2 INJECTION, SOLUTION INTRAMUSCULAR; INTRAVENOUS at 12:16

## 2021-09-28 RX ADMIN — SODIUM CHLORIDE, POTASSIUM CHLORIDE, SODIUM LACTATE AND CALCIUM CHLORIDE: 600; 310; 30; 20 INJECTION, SOLUTION INTRAVENOUS at 10:11

## 2021-09-28 RX ADMIN — CLINDAMYCIN IN 5 PERCENT DEXTROSE 900 MG: 18 INJECTION, SOLUTION INTRAVENOUS at 10:23

## 2021-09-28 RX ADMIN — KETAMINE HYDROCHLORIDE 25 MG: 10 INJECTION, SOLUTION INTRAMUSCULAR; INTRAVENOUS at 10:35

## 2021-09-28 RX ADMIN — DEXMEDETOMIDINE HYDROCHLORIDE 8 MCG: 100 INJECTION, SOLUTION, CONCENTRATE INTRAVENOUS at 10:37

## 2021-09-28 RX ADMIN — KETAMINE HYDROCHLORIDE 15 MG: 10 INJECTION, SOLUTION INTRAMUSCULAR; INTRAVENOUS at 10:45

## 2021-09-28 RX ADMIN — ROCURONIUM BROMIDE 10 MG: 10 SOLUTION INTRAVENOUS at 10:17

## 2021-09-28 RX ADMIN — DEXMEDETOMIDINE HYDROCHLORIDE 4 MCG: 100 INJECTION, SOLUTION, CONCENTRATE INTRAVENOUS at 10:45

## 2021-09-28 RX ADMIN — SUCCINYLCHOLINE CHLORIDE 120 MG: 20 INJECTION, SOLUTION INTRAMUSCULAR; INTRAVENOUS at 10:17

## 2021-09-28 RX ADMIN — Medication 20 MCG/MIN: at 10:32

## 2021-09-28 RX ADMIN — PROPOFOL 50 MCG/KG/MIN: 10 INJECTION, EMULSION INTRAVENOUS at 10:24

## 2021-09-28 RX ADMIN — MIDAZOLAM 0.5 MG: 1 INJECTION INTRAMUSCULAR; INTRAVENOUS at 13:05

## 2021-09-28 RX ADMIN — FENTANYL CITRATE 25 MCG: 50 INJECTION, SOLUTION INTRAMUSCULAR; INTRAVENOUS at 10:14

## 2021-09-28 NOTE — DISCHARGE INSTRUCTIONS
[facility], [facility]  POSTOPERATIVE INSTRUCTIONS  FOR PATIENTS UNDERGOING  SURGERY OF THE EAR     1. Do not blow your nose for three weeks following surgery. If you sneeze or cough do so with your mouth open. 2. DO NOT USE CPAP or BIPAP  x 4 weeks following the operation. DO NOT  USE Q-tips® or stick anything in the operated ear. 3. Avoid any heavy lifting (over 10 lbs.), straining or bending for three weeks following surgery. 4. Keep your head elevated as much as possible x 48 hours . Sleep and rest on two to three pillows if possible. 5. Do not get water in your ear. If showering or washing your hair place a piece of cotton coated in Vaseline in the ear canal to seal it. If there is a separate incision keep this dry x 48 hours. 6. If you wear glasses either remove the arm on the operated side or make certain that it does not rest on the incision behind your ear for one week. 7. Beginning one day after surgery try to leave the cotton out of our ear as much as possible unless there is significant drainage. 8. Some drainage from your ear canal may occur after surgery. If there is a separate incision some drainage may occur from this area also. If the drainage is profuse or develops a foul odor please call. 9. Popping sounds, a plugged sensation, ringing or fluctuating hearing may be noticed in the ear during the healing. 10. Avoid travel by air for 3 weeks following surgery. 11. If you should notice any swelling, redness or excessive pain please call. 12. Some dizziness may occur after surgery. If it becomes severe or is associated with nausea or vomiting please call. 13. Please call [facility] [employer] [facility]. to make an appointment to be seen 7-10 days after the time of your surgery unless stated otherwise by your physician. I understand and acknowledge receipt of the instructions indicated above. Physician's or R.N.'s Signature                                                                  Date/Time                                                                                                                                           Patient or Representative Signature                                                          Date/Time          [address] M.D.  Fadia Wells.   [address]  [phone] office
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
## 2021-09-28 NOTE — BRIEF OP NOTE
Brief Postoperative Note    Patient: Romayne Rook Buckles  YOB: 1955  MRN: [id number]    Date of Procedure: 9/28/2021     Pre-Op Diagnosis: CHRONIC SUPPURATIVE MASTOIDITIS OF LEFT SIDE, ACTIVE, FACIAL PARALYSIS ON LEFT SIDE, ACTIVE    Post-Op Diagnosis: Same as preoperative diagnosis. Procedure(s):  LEFT TYMPANOASTOIDECTOMY POSSIBLE EARTUBE    Surgeon(s):  Viviana Morley MD    Surgical Assistant: None    Anesthesia: General     Estimated Blood Loss (mL): Minimal    Complications: None    Specimens:   ID Type Source Tests Collected by Time Destination   1 : Left Mastoid Contents Frozen Section Mastoid  Viviana Morley MD 9/28/2021 1110 Pathology   2 : Left Mastoid Contents Fresh Ear, Left  Viviana Morley MD 9/28/2021 1212 Pathology        Implants:   Implant Name Type Inv.  Item Serial No.  Lot No. LRB No. Used Action   IMPLANT OFFSET ALTO TIT H2.5X3.5 D0.8 - SNA  IMPLANT OFFSET ALTO TIT H2.5X3.5 D0.8 NA [facility] [id number] Left 1 Implanted       Drains: * No LDAs found *    Findings: see op note    Electronically Signed by Alivia Murcia MD on 9/28/2021 at 12:39 PM

## 2021-09-28 NOTE — ANESTHESIA PREPROCEDURE EVALUATION
Relevant Problems   No relevant active problems       Anesthetic History   No history of anesthetic complications            Review of Systems / Medical History  Patient summary reviewed, nursing notes reviewed and pertinent labs reviewed    Pulmonary  Within defined limits                 Neuro/Psych   Within defined limits           Cardiovascular    Hypertension        Dysrhythmias : PVC  CAD         GI/Hepatic/Renal  Within defined limits              Endo/Other        Arthritis (RA)     Other Findings              Physical Exam    Airway  Mallampati: II  TM Distance: 4 - 6 cm  Neck ROM: normal range of motion   Mouth opening: Normal     Cardiovascular  Regular rate and rhythm,  S1 and S2 normal,  no murmur, click, rub, or gallop             Dental  No notable dental hx       Pulmonary  Breath sounds clear to auscultation               Abdominal  GI exam deferred       Other Findings            Anesthetic Plan    ASA: 2  Anesthesia type: general          Induction: Intravenous  Anesthetic plan and risks discussed with: Patient

## 2021-09-28 NOTE — ANESTHESIA POSTPROCEDURE EVALUATION
`Post-Anesthesia Evaluation and Assessment    Patient: Sharif Nazario MRN: [id number]  SSN: [id number]    YOB: 1955  Age: 76 y.o. Sex: female       Cardiovascular Function/Vital Signs  Visit Vitals  /74   Pulse 96   Temp 36.7 °C (98 °F)   Resp 19   SpO2 94%       Patient is status post General anesthesia for Procedure(s):  LEFT TYMPANOASTOIDECTOMY POSSIBLE EARTUBE. Nausea/Vomiting: None    Postoperative hydration reviewed and adequate. Pain:  Pain Scale 1: Numeric (0 - 10) (09/28/21 0948)  Pain Intensity 1: 0 (09/28/21 0948)   Managed    Neurological Status:   Neuro (WDL): Within Defined Limits (09/28/21 0954)   At baseline    Mental Status and Level of Consciousness: Alert and oriented to person, place, and time    Pulmonary Status:   O2 Device: Nasal cannula (09/28/21 1303)   Adequate oxygenation and airway patent    Complications related to anesthesia: None    Post-anesthesia assessment completed. No concerns    Signed By: Barber Stanley MD     September 28, 2021              Procedure(s):  LEFT TYMPANOASTOIDECTOMY POSSIBLE EARTUBE.    general    <BSHSIANPOST>    INITIAL Post-op Vital signs:   Vitals Value Taken Time   /69 09/28/21 1330   Temp 36.7 °C (98 °F) 09/28/21 1303   Pulse 96 09/28/21 1303   Resp 19 09/28/21 1303   SpO2 96 % 09/28/21 1333   Vitals shown include unvalidated device data.

## 2021-09-28 NOTE — OP NOTES
[address] [facility]  OPERATIVE REPORT    Name:  Cher Gupta  MR#:  [id number]  :  1955  ACCOUNT #:  [de-identified]  DATE OF SERVICE:  2021      PREOPERATIVE DIAGNOSIS:  Left chronic otitis media with facial nerve paralysis. POSTOPERATIVE DIAGNOSIS:  Left chronic otitis media with facial nerve paralysis. PROCEDURES PERFORMED:  1. Left tympanomastoidectomy with ossicular chain reconstruction. 2.  Left myringotomy and tube. 3.  Facial nerve monitoring x2 hours. 4.  Placement of facial nerve electrodes. 5.  Microdissection. 6.  Decompression of the facial nerve medial aspect. SURGEON:  Angely Julian MD    ASSISTANT:  None. ANESTHESIA:  General.    COMPLICATIONS:  None. SPECIMENS REMOVED:  Left mastoid contents, both frozen and permanent. The mastoid contents were removed and sent off for frozen section revealing this was dense fibrous infected tissue. IMPLANTS:  [employer]ing [employer] offset prosthesis, lot number [id number], titanium, MRI safe to 1.5 Blaire. ESTIMATED BLOOD LOSS:  20 mL. FINDINGS:  1. Completely filled middle ear spaces fibrous yellow tissue adherent to the ossicular chain, facial nerve, and entire middle ear space including promontory, round window, and oval window. 2.  Mastoid completely filled with fibrous tissue extending down and completely blocking the antrum up to the skull base and down to the mastoid tip. 3.  Complete mastoidectomy performed. Soft tissue removed. Frozen section sent on the specimens. 4.  Dense fibrous tissue found in the mastoid air cells completely filling the mastoid antrum and middle ear space adherent to the entire middle ear. 5.  Myringotomy and tube performed to allow aeration of the middle ear. 6.  Facial nerve decompressed in the medial aspect. 7.  Incus was eroded off of the stapes superstructure as there was no stapes superstructure. The stapes footplate did appear to be mobile as was the malleus.   [facility] offset prosthesis was placed from the footplate to under the malleus bone and secured with Gelfoam.  9.  No second look procedure necessary. INDICATION:  The patient was sent to my office in late 08/2021. She had an infection in the left mastoid bone in 07/2021 resulting in left facial paralysis that progressed over the next month. She continued to have maximal hearing loss in the left side albeit conductive as well as progressive facial nerve paralysis. She presented preoperatively today with a House-Brackmann 5 out of 6 on the left side with incomplete eye closure and no buccal movement, maybe some slight movement around the auricle of the left eye. PROCEDURE:  After the patient received informed consent, she was taken to the operating room. The patient was kept in supine position, dressed and draped in the usual fashion. After administration of general anesthesia, the table was turned 180 degrees away from the neutral position. 10 mL of 2% lidocaine with 1:100,000 epinephrine was injected in the postauricular site on the left side. The operation would begin on the left side. The patient was dressed and draped in usual sterile fashion. The operating microscope was used for the entire case. The left postauricular incision was then made using a 15-blade. Deeper tissues were incised. We removed the soft tissue over the mastoid bone and it was then held in place with self-retaining retractors. Using continuous suction irrigation and cutting bur, a standard mastoidectomy was then drilled. The mastoid was drilled and immediately underneath the cortical bone, there was dense fibrous yellow tissue that was adherent to the entire mastoid air cell chain. Several samples were then taken and sent off to the pathology lab which would later be returned as dense fibrous chronically infected tissue. At this point, we continued with the mastoidectomy. The mastoid air cells were then removed posteriorly. The sigmoid sinus was identified. The tegmen was identified in its usual position, followed down towards the antrum. The antrum was then completely filled with soft tissue and it was opened. Prior to starting the case, facial nerve electrodes were placed in the left orbicularis oris and left orbicularis oculi muscles. Continuous facial nerve monitoring was taking place for the entire case. The baseline facial muscular activity was less than 10 mA. There was no aberrant facial nerve stimulation throughout the entire case. The electrode impedances were checked preoperatively and found to be less than 1.0 kilo-ohms. The case would then begin as above. Once the majority of the tissue was then removed from the mastoid cavity, the antrum was then completely opened and the incus could be identified and was hypermobile. Through a vascular strip incision, a tympanomeatal flap was constructed and raised. The tympanic membrane was intact prior to raising the flap and it was obvious that there was thick yellow tissue filling the middle ear space. The tympanic membrane was then elevated anteriorly. The chorda tympani was not identified. The middle ear was completely packed with dense fibrous tissue which over the next 45 minutes took place towards removing this. It was removed from the cochlear promontory and the malleus. The incus was found not be in contact with the stapes superstructure. The incus was removed. In addition, the stapes superstructure was not identified and the stapes footplate was identified and found to be in good position and mobile. The soft tissue overlying this was then removed. The facial nerve was then decompressed. There was soft tissue overlying the facial nerve on this side. The Prass probe was brought to the table and used on the high setting, approximately 2.0 mA and the facial nerve could not be stimulated from the beginning of the case.   The soft tissue overlying the facial nerve was then removed to decompress the facial nerve medially. Once this was performed, the bone overlying the facial nerve was not present. The facial nerve was intact. There was no bleeding and the facial nerve could again not be stimulated with the Prass probe. At this point, a [facility] offset prosthesis was brought to the table, cut to its new shortest length, placed on the footplate and stapes and secured with Gelfoam.  The remainder of the tissue in the middle ear space was removed prior to this and the eustachian tube was opened as well. Next, the tympanomeatal flap was reapproximated. Anterior-inferior incision was made and a Triune tube was then placed. The external auditory canal was packed with Cipro Gelfoam and the postauricular incision was closed with 3-0 and 4-0 Vicryl in interrupted fashion. Dermabond was used for the skin and a mastoid dressing was placed.       Abe Espinoza MD      [id number]  D:  09/28/2021 12:49  T:  09/28/2021 15:57  JOB #:  [id number]

## 2022-02-21 ENCOUNTER — TRANSCRIBE ORDER (OUTPATIENT)
Dept: SCHEDULING | Age: 67
End: 2022-02-21

## 2022-02-21 DIAGNOSIS — R05.3 CHRONIC COUGH: ICD-10-CM

## 2022-02-21 DIAGNOSIS — D84.9 IMMUNOSUPPRESSION-RELATED INFECTIOUS DISEASE (HCC): ICD-10-CM

## 2022-02-21 DIAGNOSIS — J34.89 OVERDEVELOPMENT OF NASAL BONES: Primary | ICD-10-CM

## 2022-02-21 DIAGNOSIS — B99.8 IMMUNOSUPPRESSION-RELATED INFECTIOUS DISEASE (HCC): ICD-10-CM

## 2022-02-24 ENCOUNTER — TRANSCRIBE ORDER (OUTPATIENT)
Dept: GENERAL RADIOLOGY | Age: 67
End: 2022-02-24

## 2022-02-24 ENCOUNTER — HOSPITAL ENCOUNTER (OUTPATIENT)
Dept: GENERAL RADIOLOGY | Age: 67
Discharge: HOME | End: 2022-02-24
Attending: OTOLARYNGOLOGY
Payer: MEDICARE

## 2022-02-24 ENCOUNTER — HOSPITAL ENCOUNTER (OUTPATIENT)
Dept: CT IMAGING | Age: 67
Discharge: HOME | End: 2022-02-24
Attending: OTOLARYNGOLOGY
Payer: MEDICARE

## 2022-02-24 DIAGNOSIS — B99.8 IMMUNOSUPPRESSION-RELATED INFECTIOUS DISEASE (HCC): ICD-10-CM

## 2022-02-24 DIAGNOSIS — R05.9 COUGH: ICD-10-CM

## 2022-02-24 DIAGNOSIS — J34.89 ANTERIOR NASAL STENOSIS: ICD-10-CM

## 2022-02-24 DIAGNOSIS — R05.3 CHRONIC COUGH: ICD-10-CM

## 2022-02-24 DIAGNOSIS — D84.9 IMMUNOSUPPRESSION (HCC): ICD-10-CM

## 2022-02-24 DIAGNOSIS — R05.9 COUGH: Primary | ICD-10-CM

## 2022-02-24 DIAGNOSIS — J34.89 OVERDEVELOPMENT OF NASAL BONES: ICD-10-CM

## 2022-02-24 DIAGNOSIS — D84.9 IMMUNOSUPPRESSION-RELATED INFECTIOUS DISEASE (HCC): ICD-10-CM

## 2022-02-24 PROCEDURE — 70486 CT MAXILLOFACIAL W/O DYE: CPT

## 2022-02-24 PROCEDURE — 71046 X-RAY EXAM CHEST 2 VIEWS: CPT

## 2022-03-18 PROBLEM — I35.1 NONRHEUMATIC AORTIC VALVE INSUFFICIENCY: Status: ACTIVE | Noted: 2018-02-12

## 2022-03-19 PROBLEM — E87.6 HYPOKALEMIA: Status: ACTIVE | Noted: 2017-10-02

## 2022-03-19 PROBLEM — I49.3 PVC (PREMATURE VENTRICULAR CONTRACTION): Status: ACTIVE | Noted: 2018-02-12

## 2022-03-19 PROBLEM — E78.5 DYSLIPIDEMIA: Status: ACTIVE | Noted: 2018-02-12

## 2022-03-19 PROBLEM — I25.10 CORONARY ARTERY DISEASE INVOLVING NATIVE CORONARY ARTERY OF NATIVE HEART WITHOUT ANGINA PECTORIS: Status: ACTIVE | Noted: 2018-02-12

## 2022-03-20 PROBLEM — S99.199A: Status: ACTIVE | Noted: 2021-02-25

## 2022-03-29 ENCOUNTER — TRANSCRIBE ORDER (OUTPATIENT)
Dept: SCHEDULING | Age: 67
End: 2022-03-29

## 2022-03-29 DIAGNOSIS — Z12.31 SCREENING MAMMOGRAM FOR HIGH-RISK PATIENT: Primary | ICD-10-CM

## 2022-04-07 ENCOUNTER — HOSPITAL ENCOUNTER (OUTPATIENT)
Dept: MAMMOGRAPHY | Age: 67
Discharge: HOME | End: 2022-04-07
Attending: FAMILY MEDICINE
Payer: MEDICARE

## 2022-04-07 DIAGNOSIS — Z12.31 SCREENING MAMMOGRAM FOR HIGH-RISK PATIENT: ICD-10-CM

## 2022-04-07 PROCEDURE — 77063 BREAST TOMOSYNTHESIS BI: CPT

## 2022-05-20 ENCOUNTER — HOSPITAL ENCOUNTER (INPATIENT)
Age: 67
LOS: 3 days | Discharge: HOME | DRG: 315 | End: 2022-05-23
Attending: STUDENT IN AN ORGANIZED HEALTH CARE EDUCATION/TRAINING PROGRAM | Admitting: FAMILY MEDICINE
Payer: MEDICARE

## 2022-05-20 ENCOUNTER — APPOINTMENT (OUTPATIENT)
Dept: GENERAL RADIOLOGY | Age: 67
DRG: 315 | End: 2022-05-20
Attending: STUDENT IN AN ORGANIZED HEALTH CARE EDUCATION/TRAINING PROGRAM
Payer: MEDICARE

## 2022-05-20 ENCOUNTER — APPOINTMENT (OUTPATIENT)
Dept: CT IMAGING | Age: 67
DRG: 315 | End: 2022-05-20
Attending: STUDENT IN AN ORGANIZED HEALTH CARE EDUCATION/TRAINING PROGRAM
Payer: MEDICARE

## 2022-05-20 DIAGNOSIS — J96.01 ACUTE RESPIRATORY FAILURE WITH HYPOXIA (HCC): Primary | ICD-10-CM

## 2022-05-20 DIAGNOSIS — M31.30 WEGENER'S GRANULOMATOSIS WITHOUT RENAL INVOLVEMENT (HCC): ICD-10-CM

## 2022-05-20 PROBLEM — R09.02 HYPOXEMIA: Status: ACTIVE | Noted: 2022-05-20

## 2022-05-20 LAB
ALBUMIN SERPL-MCNC: 3.7 G/DL (ref 3.5–5)
ALBUMIN/GLOB SERPL: 1.1 {RATIO} (ref 1.1–2.2)
ALP SERPL-CCNC: 78 U/L (ref 45–117)
ALT SERPL-CCNC: 31 U/L (ref 12–78)
ANION GAP SERPL CALC-SCNC: 7 MMOL/L (ref 5–15)
ARTERIAL PATENCY WRIST A: POSITIVE
AST SERPL-CCNC: 19 U/L (ref 15–37)
ATRIAL RATE: 99 BPM
BASE EXCESS BLD CALC-SCNC: 3.2 MMOL/L
BASOPHILS # BLD: 0 K/UL (ref 0–0.1)
BASOPHILS NFR BLD: 0 % (ref 0–1)
BDY SITE: ABNORMAL
BILIRUB SERPL-MCNC: 0.9 MG/DL (ref 0.2–1)
BNP SERPL-MCNC: 129 PG/ML
BUN SERPL-MCNC: 17 MG/DL (ref 6–20)
BUN/CREAT SERPL: 26 (ref 12–20)
CALCIUM SERPL-MCNC: 9.2 MG/DL (ref 8.5–10.1)
CALCULATED P AXIS, ECG09: 36 DEGREES
CALCULATED R AXIS, ECG10: 19 DEGREES
CALCULATED T AXIS, ECG11: 28 DEGREES
CHLORIDE SERPL-SCNC: 104 MMOL/L (ref 97–108)
CO2 SERPL-SCNC: 28 MMOL/L (ref 21–32)
COMMENT, HOLDF: NORMAL
COVID-19 RAPID TEST, COVR: NOT DETECTED
CREAT SERPL-MCNC: 0.66 MG/DL (ref 0.55–1.02)
CRP SERPL-MCNC: 0.36 MG/DL (ref 0–0.6)
DIAGNOSIS, 93000: NORMAL
DIFFERENTIAL METHOD BLD: ABNORMAL
EOSINOPHIL # BLD: 0 K/UL (ref 0–0.4)
EOSINOPHIL NFR BLD: 0 % (ref 0–7)
ERYTHROCYTE [DISTWIDTH] IN BLOOD BY AUTOMATED COUNT: 16.5 % (ref 11.5–14.5)
ERYTHROCYTE [SEDIMENTATION RATE] IN BLOOD: 7 MM/HR (ref 0–30)
GAS FLOW.O2 O2 DELIVERY SYS: ABNORMAL L/MIN
GLOBULIN SER CALC-MCNC: 3.3 G/DL (ref 2–4)
GLUCOSE SERPL-MCNC: 228 MG/DL (ref 65–100)
HCO3 BLD-SCNC: 27.2 MMOL/L (ref 22–26)
HCT VFR BLD AUTO: 41.5 % (ref 35–47)
HGB BLD-MCNC: 12.6 G/DL (ref 11.5–16)
IMM GRANULOCYTES # BLD AUTO: 0.2 K/UL (ref 0–0.04)
IMM GRANULOCYTES NFR BLD AUTO: 1 % (ref 0–0.5)
LYMPHOCYTES # BLD: 0.3 K/UL (ref 0.8–3.5)
LYMPHOCYTES NFR BLD: 2 % (ref 12–49)
MCH RBC QN AUTO: 31.9 PG (ref 26–34)
MCHC RBC AUTO-ENTMCNC: 30.4 G/DL (ref 30–36.5)
MCV RBC AUTO: 105.1 FL (ref 80–99)
MONOCYTES # BLD: 0.2 K/UL (ref 0–1)
MONOCYTES NFR BLD: 1 % (ref 5–13)
NEUTS SEG # BLD: 14.3 K/UL (ref 1.8–8)
NEUTS SEG NFR BLD: 96 % (ref 32–75)
NRBC # BLD: 0 K/UL (ref 0–0.01)
NRBC BLD-RTO: 0 PER 100 WBC
P-R INTERVAL, ECG05: 108 MS
PCO2 BLD: 38.7 MMHG (ref 35–45)
PH BLD: 7.46 [PH] (ref 7.35–7.45)
PLATELET # BLD AUTO: 358 K/UL (ref 150–400)
PMV BLD AUTO: 9.1 FL (ref 8.9–12.9)
PO2 BLD: 209 MMHG (ref 80–100)
POTASSIUM SERPL-SCNC: 3.6 MMOL/L (ref 3.5–5.1)
PROT SERPL-MCNC: 7 G/DL (ref 6.4–8.2)
Q-T INTERVAL, ECG07: 342 MS
QRS DURATION, ECG06: 66 MS
QTC CALCULATION (BEZET), ECG08: 438 MS
RBC # BLD AUTO: 3.95 M/UL (ref 3.8–5.2)
RBC MORPH BLD: ABNORMAL
RBC MORPH BLD: ABNORMAL
SAMPLES BEING HELD,HOLD: NORMAL
SAO2 % BLD: 99.8 % (ref 92–97)
SODIUM SERPL-SCNC: 139 MMOL/L (ref 136–145)
SOURCE, COVRS: NORMAL
SPECIMEN TYPE: ABNORMAL
TROPONIN-HIGH SENSITIVITY: 5 NG/L (ref 0–51)
VENTRICULAR RATE, ECG03: 99 BPM
WBC # BLD AUTO: 15 K/UL (ref 3.6–11)

## 2022-05-20 PROCEDURE — 80053 COMPREHEN METABOLIC PANEL: CPT

## 2022-05-20 PROCEDURE — 71045 X-RAY EXAM CHEST 1 VIEW: CPT

## 2022-05-20 PROCEDURE — 83880 ASSAY OF NATRIURETIC PEPTIDE: CPT

## 2022-05-20 PROCEDURE — 93005 ELECTROCARDIOGRAM TRACING: CPT

## 2022-05-20 PROCEDURE — 74011250637 HC RX REV CODE- 250/637: Performed by: FAMILY MEDICINE

## 2022-05-20 PROCEDURE — 84484 ASSAY OF TROPONIN QUANT: CPT

## 2022-05-20 PROCEDURE — 94640 AIRWAY INHALATION TREATMENT: CPT

## 2022-05-20 PROCEDURE — 65270000046 HC RM TELEMETRY

## 2022-05-20 PROCEDURE — 85025 COMPLETE CBC W/AUTO DIFF WBC: CPT

## 2022-05-20 PROCEDURE — 74011000636 HC RX REV CODE- 636: Performed by: RADIOLOGY

## 2022-05-20 PROCEDURE — 86140 C-REACTIVE PROTEIN: CPT

## 2022-05-20 PROCEDURE — 74011250636 HC RX REV CODE- 250/636: Performed by: FAMILY MEDICINE

## 2022-05-20 PROCEDURE — 36415 COLL VENOUS BLD VENIPUNCTURE: CPT

## 2022-05-20 PROCEDURE — 82803 BLOOD GASES ANY COMBINATION: CPT

## 2022-05-20 PROCEDURE — 85652 RBC SED RATE AUTOMATED: CPT

## 2022-05-20 PROCEDURE — 99285 EMERGENCY DEPT VISIT HI MDM: CPT

## 2022-05-20 PROCEDURE — 36600 WITHDRAWAL OF ARTERIAL BLOOD: CPT

## 2022-05-20 PROCEDURE — 87635 SARS-COV-2 COVID-19 AMP PRB: CPT

## 2022-05-20 PROCEDURE — 74011000250 HC RX REV CODE- 250: Performed by: FAMILY MEDICINE

## 2022-05-20 PROCEDURE — 96374 THER/PROPH/DIAG INJ IV PUSH: CPT

## 2022-05-20 PROCEDURE — 71275 CT ANGIOGRAPHY CHEST: CPT

## 2022-05-20 PROCEDURE — 74011250636 HC RX REV CODE- 250/636: Performed by: STUDENT IN AN ORGANIZED HEALTH CARE EDUCATION/TRAINING PROGRAM

## 2022-05-20 RX ORDER — ZOLPIDEM TARTRATE 5 MG/1
2.5 TABLET ORAL
Status: DISCONTINUED | OUTPATIENT
Start: 2022-05-20 | End: 2022-05-23 | Stop reason: HOSPADM

## 2022-05-20 RX ORDER — LEVOTHYROXINE SODIUM 100 UG/1
100 TABLET ORAL
COMMUNITY

## 2022-05-20 RX ORDER — ALBUTEROL SULFATE 90 UG/1
8 AEROSOL, METERED RESPIRATORY (INHALATION)
Status: DISCONTINUED | OUTPATIENT
Start: 2022-05-20 | End: 2022-05-20

## 2022-05-20 RX ORDER — LANOLIN ALCOHOL/MO/W.PET/CERES
5000 CREAM (GRAM) TOPICAL DAILY
Status: DISCONTINUED | OUTPATIENT
Start: 2022-05-21 | End: 2022-05-23 | Stop reason: HOSPADM

## 2022-05-20 RX ORDER — METHOTREXATE 2.5 MG/1
20 TABLET ORAL
Status: DISCONTINUED | OUTPATIENT
Start: 2022-05-27 | End: 2022-05-23 | Stop reason: HOSPADM

## 2022-05-20 RX ORDER — POTASSIUM CHLORIDE 750 MG/1
10 TABLET, FILM COATED, EXTENDED RELEASE ORAL 2 TIMES DAILY
Status: DISCONTINUED | OUTPATIENT
Start: 2022-05-20 | End: 2022-05-23 | Stop reason: HOSPADM

## 2022-05-20 RX ORDER — MELATONIN
2000 DAILY
Status: DISCONTINUED | OUTPATIENT
Start: 2022-05-21 | End: 2022-05-23 | Stop reason: HOSPADM

## 2022-05-20 RX ORDER — SPIRONOLACTONE 25 MG/1
12.5 TABLET ORAL DAILY
Status: DISCONTINUED | OUTPATIENT
Start: 2022-05-21 | End: 2022-05-21

## 2022-05-20 RX ORDER — POLYETHYLENE GLYCOL 3350 17 G/17G
17 POWDER, FOR SOLUTION ORAL DAILY PRN
Status: DISCONTINUED | OUTPATIENT
Start: 2022-05-20 | End: 2022-05-23 | Stop reason: HOSPADM

## 2022-05-20 RX ORDER — ACETAMINOPHEN 650 MG/1
650 SUPPOSITORY RECTAL
Status: DISCONTINUED | OUTPATIENT
Start: 2022-05-20 | End: 2022-05-23 | Stop reason: HOSPADM

## 2022-05-20 RX ORDER — AMLODIPINE BESYLATE 5 MG/1
5 TABLET ORAL
Status: DISCONTINUED | OUTPATIENT
Start: 2022-05-20 | End: 2022-05-23 | Stop reason: HOSPADM

## 2022-05-20 RX ORDER — LEUCOVORIN CALCIUM 5 MG/1
5 TABLET ORAL
Status: DISCONTINUED | OUTPATIENT
Start: 2022-05-21 | End: 2022-05-23 | Stop reason: HOSPADM

## 2022-05-20 RX ORDER — FAMOTIDINE 20 MG/1
20 TABLET, FILM COATED ORAL
Status: DISCONTINUED | OUTPATIENT
Start: 2022-05-20 | End: 2022-05-23 | Stop reason: HOSPADM

## 2022-05-20 RX ORDER — DAPSONE 100 MG/1
100 TABLET ORAL DAILY
Status: DISCONTINUED | OUTPATIENT
Start: 2022-05-21 | End: 2022-05-23 | Stop reason: HOSPADM

## 2022-05-20 RX ORDER — SODIUM CHLORIDE 0.9 % (FLUSH) 0.9 %
5-40 SYRINGE (ML) INJECTION AS NEEDED
Status: DISCONTINUED | OUTPATIENT
Start: 2022-05-20 | End: 2022-05-23 | Stop reason: HOSPADM

## 2022-05-20 RX ORDER — PREDNISONE 10 MG/1
30 TABLET ORAL
COMMUNITY
End: 2022-05-23

## 2022-05-20 RX ORDER — SODIUM CHLORIDE 0.9 % (FLUSH) 0.9 %
5-40 SYRINGE (ML) INJECTION EVERY 8 HOURS
Status: DISCONTINUED | OUTPATIENT
Start: 2022-05-20 | End: 2022-05-23 | Stop reason: HOSPADM

## 2022-05-20 RX ORDER — ONDANSETRON 2 MG/ML
4 INJECTION INTRAMUSCULAR; INTRAVENOUS
Status: DISCONTINUED | OUTPATIENT
Start: 2022-05-20 | End: 2022-05-23 | Stop reason: HOSPADM

## 2022-05-20 RX ORDER — LEVOTHYROXINE SODIUM 100 UG/1
100 TABLET ORAL
Status: DISCONTINUED | OUTPATIENT
Start: 2022-05-21 | End: 2022-05-23 | Stop reason: HOSPADM

## 2022-05-20 RX ORDER — ACETAMINOPHEN 325 MG/1
650 TABLET ORAL
Status: DISCONTINUED | OUTPATIENT
Start: 2022-05-20 | End: 2022-05-23 | Stop reason: HOSPADM

## 2022-05-20 RX ORDER — ASPIRIN 81 MG/1
81 TABLET ORAL DAILY
Status: DISCONTINUED | OUTPATIENT
Start: 2022-05-21 | End: 2022-05-23 | Stop reason: HOSPADM

## 2022-05-20 RX ORDER — FAMOTIDINE 20 MG/1
20 TABLET, FILM COATED ORAL 2 TIMES DAILY
COMMUNITY
End: 2022-06-13

## 2022-05-20 RX ORDER — FAMOTIDINE 20 MG/1
20 TABLET, FILM COATED ORAL 2 TIMES DAILY
Status: DISCONTINUED | OUTPATIENT
Start: 2022-05-20 | End: 2022-05-21

## 2022-05-20 RX ORDER — IPRATROPIUM BROMIDE AND ALBUTEROL SULFATE 2.5; .5 MG/3ML; MG/3ML
3 SOLUTION RESPIRATORY (INHALATION)
Status: DISCONTINUED | OUTPATIENT
Start: 2022-05-20 | End: 2022-05-23 | Stop reason: HOSPADM

## 2022-05-20 RX ORDER — DAPSONE 100 MG/1
100 TABLET ORAL DAILY
COMMUNITY

## 2022-05-20 RX ORDER — PRAVASTATIN SODIUM 20 MG/1
20 TABLET ORAL
Status: DISCONTINUED | OUTPATIENT
Start: 2022-05-20 | End: 2022-05-23 | Stop reason: HOSPADM

## 2022-05-20 RX ADMIN — POTASSIUM CHLORIDE 10 MEQ: 750 TABLET, FILM COATED, EXTENDED RELEASE ORAL at 18:16

## 2022-05-20 RX ADMIN — IPRATROPIUM BROMIDE AND ALBUTEROL SULFATE 3 ML: .5; 3 SOLUTION RESPIRATORY (INHALATION) at 22:26

## 2022-05-20 RX ADMIN — AMLODIPINE BESYLATE 5 MG: 5 TABLET ORAL at 21:40

## 2022-05-20 RX ADMIN — METHYLPREDNISOLONE SODIUM SUCCINATE 125 MG: 125 INJECTION, POWDER, FOR SOLUTION INTRAMUSCULAR; INTRAVENOUS at 14:31

## 2022-05-20 RX ADMIN — SODIUM CHLORIDE, PRESERVATIVE FREE 10 ML: 5 INJECTION INTRAVENOUS at 21:54

## 2022-05-20 RX ADMIN — SODIUM CHLORIDE, PRESERVATIVE FREE 10 ML: 5 INJECTION INTRAVENOUS at 21:53

## 2022-05-20 RX ADMIN — IPRATROPIUM BROMIDE AND ALBUTEROL SULFATE 3 ML: .5; 3 SOLUTION RESPIRATORY (INHALATION) at 16:22

## 2022-05-20 RX ADMIN — AZITHROMYCIN DIHYDRATE 500 MG: 500 INJECTION, POWDER, LYOPHILIZED, FOR SOLUTION INTRAVENOUS at 21:52

## 2022-05-20 RX ADMIN — IOPAMIDOL 80 ML: 755 INJECTION, SOLUTION INTRAVENOUS at 17:35

## 2022-05-20 RX ADMIN — PRAVASTATIN SODIUM 20 MG: 20 TABLET ORAL at 21:40

## 2022-05-20 RX ADMIN — FAMOTIDINE 20 MG: 20 TABLET, FILM COATED ORAL at 16:23

## 2022-05-20 RX ADMIN — METHYLPREDNISOLONE SODIUM SUCCINATE 60 MG: 40 INJECTION, POWDER, FOR SOLUTION INTRAMUSCULAR; INTRAVENOUS at 21:52

## 2022-05-20 RX ADMIN — ZOLPIDEM TARTRATE 2.5 MG: 5 TABLET ORAL at 22:12

## 2022-05-20 NOTE — PROGRESS NOTES
Admission Medication Reconciliation:      Spoke with patient by telephone @ [phone], unable to speak with patient face to face at this time due to general isolation precautions in the ED related to COVID-19 pandemic. Patient is a reliable historian. RX query is available at this time and aligns with patient's report. Interview included questions regarding use of:   PTA medications including prescription/OTC, vitamins, supplements, inhaled, topical, injectable, otic and ophthalmic medications   Alcohol, nicotine products, THC and related compounds, illicit drugs, stimulant use (i.e., Red Bull), agents used to assist with sleep and/or pain control issues, and whether patient uses other people's medications of any kind    Medication changes (since last review): Added:   Dapsone   Prednisone   Famotidine    Revised:   Levothyroxine to 100 mcg   Methotrexate: takes 20 mg on Fridays   Leucovorin: takes on Saturdays   Ambien: takes 1/3 of 10 mg tablet, rarely uses   Spironolactone: takes 12.5 mg (half of 25 mg tablet)    Deleted:   Cetirizine   Esomeprazole   Fluconazole   Naproxen   Nystatin oral suspension   Ofloxacin eye drop   Ondansetron      Thank you for allowing me to participate in the care of your patient. Benja Fall PharmD, RN # [phone]       [facility] pharmacy benefit data reflects medications filled and processed through the patient's insurance, however   this data does NOT capture whether the medication was picked up or is currently being taken by the patient.     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 KRILL OIL PO 2022 at Unknown time  Yes   Sig: Take 350 mg by mouth every morning. amLODIPine (NORVASC) 5 mg tablet 2022 at Unknown time  Yes   Sig: Take 5 mg by mouth nightly. aspirin delayed-release 81 mg tablet 2022 at Unknown time  Yes   Sig: Take  by mouth daily. cholecalciferol, vitamin D3, (VITAMIN D3) 2,000 unit tab 2022 at Unknown time  Yes   Sig: Take 1 Tablet by mouth daily. cyanocobalamin 1,000 mcg tablet 2022 at Unknown time Self Yes   Sig: Take 5,000 mcg by mouth daily. dapsone 100 mg tablet 2022 at Unknown time  Yes   Sig: Take 100 mg by mouth daily. denosumab (Prolia) 60 mg/mL injection 2022 Self No   Si mg by SubCUTAneous route every 6 months. famotidine (PEPCID) 20 mg tablet 2022 at Unknown time  Yes   Sig: Take 20 mg by mouth two (2) times a day. leucovorin calcium (WELLCOVORIN) 5 mg tablet 2022  No   Sig: Take  by mouth every seven (7) days. Saturday   levothyroxine (SYNTHROID) 100 mcg tablet 2022 at Unknown time  Yes   Sig: Take 100 mcg by mouth Daily (before breakfast). methotrexate (RHEUMATREX) 2.5 mg tablet 2022 at Unknown time  Yes   Sig: Take 20 mg by mouth Every Friday. potassium chloride SR (KLOR-CON 10) 10 mEq tablet 2022 at Unknown time  Yes   Sig: TAKE 2 TABLETS TWICE A DAY   pravastatin (PRAVACHOL) 20 mg tablet 2022 at Unknown time  Yes   Sig: Take 20 mg by mouth nightly. predniSONE (DELTASONE) 10 mg tablet 2022 at Unknown time  Yes   Sig: Take 30 mg by mouth daily (with breakfast).    spironolactone (ALDACTONE) 25 mg tablet 5/20/2022 at Unknown time  Yes   Sig: TAKE ONE-HALF TABLET BY MOUTH ONCE DAILY   Patient taking differently: Take 12.5 mg by mouth daily. zolpidem (AMBIEN) 10 mg tablet 4/20/2022 at Unknown time  Yes   Sig: Take 3.33 mg by mouth nightly as needed for Sleep (Splits tablet into thirds). Facility-Administered Medications: None     Please contact the main inpatient pharmacy with any questions or concerns at [phone] and we will direct you to the clinical pharmacist covering this patient's care while in-house.    NICOLE Bray
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
## 2022-05-20 NOTE — ROUTINE PROCESS
About 1920 received from ED ambulatory GIBSON with standby assist.    Bedside and Verbal shift change report given to [facility] (oncoming nurse) by Siva Covarrubias (offgoing nurse). Report included the following information SBAR, Kardex, ED Summary, Intake/Output, MAR, Recent Results and Cardiac Rhythm NSR.      Primary Nurse Vernon Reis RN and [facility], RN performed a dual skin assessment on this patient No impairment noted  Jonny score is 23
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
## 2022-05-20 NOTE — ED TRIAGE NOTES
Pt arrives via EMS from home, c/o shortness of breath, home neb treatments not helping, states it was just making her jittery without any respiratory relief, pt states she was using her husbands O2 at home, at 4L without much help. Per EMS pt received duo-neb albuterol 5mg en route with little change, currently O2 sats at 93% on 5L via NC. Recent dx of GPA vasculitis, meds managed by Dr. Garth Esteves. Pt also has hx of [place] Palsy.

## 2022-05-20 NOTE — ROUTINE PROCESS
TRANSFER - OUT REPORT:    Verbal report given to aaron(name) on Eva White  being transferred to [facility](unit) for routine progression of care       Report consisted of patients Situation, Background, Assessment and   Recommendations(SBAR). Information from the following report(s) SBAR, Kardex, ED Summary and MAR was reviewed with the receiving nurse. Lines:   Peripheral IV 05/20/22 Left Antecubital (Active)   Site Assessment Clean, dry, & intact 05/20/22 1352   Phlebitis Assessment 0 05/20/22 1352   Infiltration Assessment 0 05/20/22 1352   Dressing Status Clean, dry, & intact 05/20/22 1352   Dressing Type Transparent 05/20/22 1352   Hub Color/Line Status Pink;Flushed 05/20/22 1352   Action Taken Blood drawn 05/20/22 1352        Opportunity for questions and clarification was provided.       Patient transported with:   [employer]

## 2022-05-20 NOTE — ROUTINE PROCESS
1839:  Calling ED for report. TRANSFER - IN REPORT:    Verbal report received from Latia Becker (name) on Sandrapaco Vazquez  being received from ED (unit) for routine progression of care      Report consisted of patients Situation, Background, Assessment and   Recommendations(SBAR). Information from the following report(s) SBAR, ED Summary, [facility] and Cardiac Rhythm NSR was reviewed with the receiving nurse. Opportunity for questions and clarification was provided. Assessment completed upon patients arrival to unit and care assumed.

## 2022-05-21 ENCOUNTER — APPOINTMENT (OUTPATIENT)
Dept: VASCULAR SURGERY | Age: 67
DRG: 315 | End: 2022-05-21
Attending: FAMILY MEDICINE
Payer: MEDICARE

## 2022-05-21 PROCEDURE — 74011250637 HC RX REV CODE- 250/637: Performed by: FAMILY MEDICINE

## 2022-05-21 PROCEDURE — 74011250637 HC RX REV CODE- 250/637: Performed by: INTERNAL MEDICINE

## 2022-05-21 PROCEDURE — 74011000250 HC RX REV CODE- 250: Performed by: FAMILY MEDICINE

## 2022-05-21 PROCEDURE — 65270000046 HC RM TELEMETRY

## 2022-05-21 PROCEDURE — 74011250636 HC RX REV CODE- 250/636: Performed by: FAMILY MEDICINE

## 2022-05-21 PROCEDURE — 93970 EXTREMITY STUDY: CPT

## 2022-05-21 RX ORDER — CALCIUM CARBONATE 200(500)MG
400 TABLET,CHEWABLE ORAL
Status: DISCONTINUED | OUTPATIENT
Start: 2022-05-21 | End: 2022-05-23 | Stop reason: HOSPADM

## 2022-05-21 RX ORDER — SPIRONOLACTONE 25 MG/1
25 TABLET ORAL DAILY
Status: DISCONTINUED | OUTPATIENT
Start: 2022-05-22 | End: 2022-05-23 | Stop reason: HOSPADM

## 2022-05-21 RX ORDER — FAMOTIDINE 20 MG/1
40 TABLET, FILM COATED ORAL 2 TIMES DAILY
Status: DISCONTINUED | OUTPATIENT
Start: 2022-05-21 | End: 2022-05-23 | Stop reason: HOSPADM

## 2022-05-21 RX ORDER — FUROSEMIDE 40 MG/1
40 TABLET ORAL DAILY
Status: DISCONTINUED | OUTPATIENT
Start: 2022-05-21 | End: 2022-05-23 | Stop reason: HOSPADM

## 2022-05-21 RX ADMIN — AZITHROMYCIN DIHYDRATE 500 MG: 500 INJECTION, POWDER, LYOPHILIZED, FOR SOLUTION INTRAVENOUS at 22:09

## 2022-05-21 RX ADMIN — Medication 2000 UNITS: at 08:30

## 2022-05-21 RX ADMIN — PRAVASTATIN SODIUM 20 MG: 20 TABLET ORAL at 22:09

## 2022-05-21 RX ADMIN — DAPSONE 100 MG: 100 TABLET ORAL at 08:29

## 2022-05-21 RX ADMIN — ZOLPIDEM TARTRATE 2.5 MG: 5 TABLET ORAL at 22:22

## 2022-05-21 RX ADMIN — ASPIRIN 81 MG: 81 TABLET, COATED ORAL at 08:30

## 2022-05-21 RX ADMIN — SPIRONOLACTONE 12.5 MG: 25 TABLET ORAL at 08:30

## 2022-05-21 RX ADMIN — METHYLPREDNISOLONE SODIUM SUCCINATE 60 MG: 40 INJECTION, POWDER, FOR SOLUTION INTRAMUSCULAR; INTRAVENOUS at 06:35

## 2022-05-21 RX ADMIN — LEUCOVORIN CALCIUM 5 MG: 5 TABLET ORAL at 08:29

## 2022-05-21 RX ADMIN — METHYLPREDNISOLONE SODIUM SUCCINATE 60 MG: 40 INJECTION, POWDER, FOR SOLUTION INTRAMUSCULAR; INTRAVENOUS at 14:17

## 2022-05-21 RX ADMIN — SODIUM CHLORIDE, PRESERVATIVE FREE 10 ML: 5 INJECTION INTRAVENOUS at 06:37

## 2022-05-21 RX ADMIN — FAMOTIDINE 20 MG: 20 TABLET ORAL at 10:22

## 2022-05-21 RX ADMIN — CALCIUM CARBONATE (ANTACID) CHEW TAB 500 MG 400 MG: 500 CHEW TAB at 14:15

## 2022-05-21 RX ADMIN — SODIUM CHLORIDE, PRESERVATIVE FREE 10 ML: 5 INJECTION INTRAVENOUS at 14:17

## 2022-05-21 RX ADMIN — METHYLPREDNISOLONE SODIUM SUCCINATE 60 MG: 40 INJECTION, POWDER, FOR SOLUTION INTRAMUSCULAR; INTRAVENOUS at 22:09

## 2022-05-21 RX ADMIN — AMLODIPINE BESYLATE 5 MG: 5 TABLET ORAL at 22:09

## 2022-05-21 RX ADMIN — LEVOTHYROXINE SODIUM 100 MCG: 0.1 TABLET ORAL at 06:36

## 2022-05-21 RX ADMIN — POTASSIUM CHLORIDE 10 MEQ: 750 TABLET, FILM COATED, EXTENDED RELEASE ORAL at 17:13

## 2022-05-21 RX ADMIN — POTASSIUM CHLORIDE 10 MEQ: 750 TABLET, FILM COATED, EXTENDED RELEASE ORAL at 08:31

## 2022-05-21 RX ADMIN — CYANOCOBALAMIN TAB 500 MCG 5000 MCG: 500 TAB at 08:30

## 2022-05-21 RX ADMIN — FAMOTIDINE 20 MG: 20 TABLET, FILM COATED ORAL at 03:56

## 2022-05-21 RX ADMIN — FAMOTIDINE 40 MG: 20 TABLET, FILM COATED ORAL at 17:12

## 2022-05-21 RX ADMIN — SODIUM CHLORIDE, PRESERVATIVE FREE 10 ML: 5 INJECTION INTRAVENOUS at 22:21

## 2022-05-21 RX ADMIN — FUROSEMIDE 40 MG: 40 TABLET ORAL at 10:28

## 2022-05-21 NOTE — PROGRESS NOTES
Bedside shift change report given to Liam Draper (oncoming nurse) by Lisette Page (offgoing nurse). Report included the following information SBAR, Kardex, Intake/Output, MAR and Cardiac Rhythm NSR.

## 2022-05-21 NOTE — PROGRESS NOTES
Problem: General Medical Care Plan  Goal: *Vital signs within specified parameters  Outcome: Progressing Towards Goal  Goal: *Labs within defined limits  Outcome: Progressing Towards Goal  Goal: *Absence of infection signs and symptoms  Outcome: Progressing Towards Goal  Goal: *Optimal pain control at patient's stated goal  Outcome: Progressing Towards Goal  Goal: *Skin integrity maintained  Outcome: Progressing Towards Goal  Goal: *Fluid volume balance  Outcome: Progressing Towards Goal  Goal: *Optimize nutritional status  Outcome: Progressing Towards Goal  Goal: *Anxiety reduced or absent  Outcome: Progressing Towards Goal  Goal: *Progressive mobility and function (eg: ADL's)  Outcome: Progressing Towards Goal     Problem: Patient Education: Go to Patient Education Activity  Goal: Patient/Family Education  Outcome: Progressing Towards Goal     Problem: Falls - Risk of  Goal: *Absence of Falls  Description: Document Wild Fall Risk and appropriate interventions in the flowsheet.   Outcome: Progressing Towards Goal  Note: Fall Risk Interventions:  Mobility Interventions: Assess mobility with egress test,Patient to call before getting OOB              Elimination Interventions: Call light in reach,Toilet paper/wipes in reach              Problem: Patient Education: Go to Patient Education Activity  Goal: Patient/Family Education  Outcome: Progressing Towards Goal
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
## 2022-05-21 NOTE — PROGRESS NOTES
[facility]  Hospitalist Group                                                                                          Hospitalist Progress Note  Heriberto Araya MD  Answering service: [phone] from in house phone        Date of Service:  2022  NAME:  Shanda Sandoval  :  1955  MRN:  [id number]      Admission Summary:   Shanda Sandoval is a 78 y.o. female who presents with Hypoxemia and shortness of breath. Per initial nursing assessment \"Pt arrives via EMS from home, c/o shortness of breath, home neb treatments not helping, states it was just making her jittery without any respiratory relief, pt states she was using her husbands O2 at home, at 4L without much help.  Per EMS pt received duo-neb albuterol 5mg en route with little change, currently O2 sats at 93% on 5L via NC. Recent dx of GPA vasculitis, meds managed by Dr. Gunjan Esteves.  Pt also has hx of [place] Palsy\"  She has a history of GPA vasculitis, aortic valve insufficiency, hypertension, rheumatoid arthritis, presenting by EMS for shortness of breath worsened progressively over the past month.  Says that her prednisone was recently tapered down from 40 mg to 30 mg that her vasculitis has been managed by rheumatology locally.  Normally does not use home oxygen and says that she was 89% on room air, EMS administered a DuoNeb prior to arrival without any improvement and she arrived requiring 5 L nasal cannula to maintain oxygen saturations of 93%.  Has been vaccinated against COVID-19.  She denies any history of structural heart disease to her knowledge, denies any associated fevers, chest pain, abdominal pain, vomiting.  No other systemic complaints.  Symptoms are severe in nature without alleviating factors. Interval history / Subjective:   Patient feeling a bit better- wean oxygen as tolerated  Consulting pulmonary today for assistance with management     Assessment & Plan:     1.  Hypoxemia- unclear etiology- hx of inflammatory DO on chronic steroids  CT chest reviewed- dilated pulmonary arteries noted- suspicious of pulm HTN  Cont nebs- PRN- SED rate and CRP unremarkable,  ECHO to check for pulmonary htn,   rapid covid neg, stable/unremarkable ABG on admission-    cont antibiotics- IV azithromycin, resumed home dapsone, cont IV steroids, diuretics  Consult pulmonary in am today for eval and tx recs  Recent admission at [facility] for similar symptoms  2. systemic vasculitis - managed by her rheumatologist  Recently being weaned down from steroids/ prednisone  - hold prednisone and cont IV solumedrol  Resume home rheum meds- prolia injections and methotrexate  3. CV- elevated BP and HR on admission- heart murmur  -suspected aortic stenosis or sclerosis-   Increased home dose diuretic and added lasix, ECHO ordered, monitor on tele  EKG reviewed, normal troponin, BNP unremarkable  4. Leukocytosis with neurophilia- unclear if due to resp infection or steroid effect- monitor  5. Hyperglycemia from steroids- monitor    6. GERD- cont pepcid and added TUMS     Code status: full  Prophylaxis: scds  Care Plan discussed with: patient  Anticipated Disposition: 24-48hrs     Hospital Problems  Date Reviewed: 9/28/2021          Codes Class Noted POA    Hypoxemia ICD-10-CM: R09.02  ICD-9-CM: 799.02  5/20/2022 Unknown                Review of Systems:   A comprehensive review of systems was negative except for that written in the HPI. Vital Signs:    Last 24hrs VS reviewed since prior progress note.  Most recent are:  Visit Vitals  BP (!) 140/69 (BP 1 Location: Right upper arm, BP Patient Position: At rest;Lying)   Pulse 81   Temp 98.4 °F (36.9 °C)   Resp 16   Ht 5' 6\" (1.676 m)   Wt 63.4 kg (139 lb 12.4 oz)   SpO2 96%   BMI 22.56 kg/m²       No intake or output data in the 24 hours ending 05/21/22 1020     Physical Examination:          Constitutional:  No acute distress, cooperative, pleasant    ENT:  Oral mucosa moist, oropharynx benign. Resp:  CTA bilaterally. No wheezing/rhonchi/rales. Decreased breath sounds, mild dyspnea    CV:  Regular rhythm, normal rate, 3/6 SE murmurs,    GI:  Soft, non distended, non tender. normoactive bowel sounds,     Musculoskeletal:  No edema, warm, 2+ pulses throughout    Neurologic:  Moves all extremities. AAOx3, CN II-XII reviewed            Data Review:    Review and/or order of clinical lab test  Review and/or order of tests in the radiology section of CPT  Review and/or order of tests in the medicine section of [facility]    CT Results  (Last 48 hours)               05/20/22 1733  CTA CHEST W OR W WO CONT Final result    Impression:  No evidence for pulmonary embolism. Narrative:  History: Shortness of breath, vasculitis, possible pulmonary embolism. CTA of the chest was performed. 100 mL Isovue-370 were injected and scanning   was performed during the arterial phase of contrast administration. Post   processing was performed. 3D reconstructions were performed. CT dose reduction   was achieved through use of a standardized protocol tailored for this   examination and automatic exposure control for dose modulation. There are no intraluminal filling defects to suggest pulmonary embolism. The   heart, pericardium, and great vessels appear unremarkable. The chest wall and   axilla appear unremarkable. Mild atelectasis in the lower lobes. Labs:     Recent Labs     05/20/22  1352   WBC 15.0*   HGB 12.6   HCT 41.5        Recent Labs     05/20/22  1352      K 3.6      CO2 28   BUN 17   CREA 0.66   *   CA 9.2     Recent Labs     05/20/22  1352   ALT 31   AP 78   TBILI 0.9   TP 7.0   ALB 3.7   GLOB 3.3     No results for input(s): INR, PTP, APTT, INREXT in the last 72 hours. No results for input(s): FE, TIBC, PSAT, FERR in the last 72 hours.    No results found for: FOL, RBCF   No results for input(s): PH, PCO2, PO2 in the last 72 hours. No results for input(s): CPK, CKNDX, TROIQ in the last 72 hours.     No lab exists for component: CPKMB  Lab Results   Component Value Date/Time    Cholesterol, total 165 04/15/2016 08:45 AM    HDL Cholesterol 70 04/15/2016 08:45 AM    LDL, calculated 78 04/15/2016 08:45 AM    Triglyceride 86 04/15/2016 08:45 AM     No results found for: [facility]  Lab Results   Component Value Date/Time    Color YELLOW/STRAW 05/14/2021 09:52 AM    Appearance CLEAR 05/14/2021 09:52 AM    Specific gravity 1.017 05/14/2021 09:52 AM    pH (UA) 7.0 05/14/2021 09:52 AM    Protein TRACE (A) 05/14/2021 09:52 AM    Glucose 100 (A) 05/14/2021 09:52 AM    Ketone Negative 05/14/2021 09:52 AM    Bilirubin Negative 05/14/2021 09:52 AM    Urobilinogen 0.2 05/14/2021 09:52 AM    Nitrites Negative 05/14/2021 09:52 AM    Leukocyte Esterase Negative 05/14/2021 09:52 AM    Epithelial cells FEW 05/14/2021 09:52 AM    Bacteria Negative 05/14/2021 09:52 AM    WBC 0-4 05/14/2021 09:52 AM    RBC 0-5 05/14/2021 09:52 AM         Medications Reviewed:     Current Facility-Administered Medications   Medication Dose Route Frequency    [START ON 5/22/2022] spironolactone (ALDACTONE) tablet 25 mg  25 mg Oral DAILY    furosemide (LASIX) tablet 40 mg  40 mg Oral DAILY    calcium carbonate (TUMS) chewable tablet 400 mg [elemental]  400 mg Oral Q4H PRN    sodium chloride (NS) flush 5-40 mL  5-40 mL IntraVENous Q8H    sodium chloride (NS) flush 5-40 mL  5-40 mL IntraVENous PRN    acetaminophen (TYLENOL) tablet 650 mg  650 mg Oral Q6H PRN    Or    acetaminophen (TYLENOL) suppository 650 mg  650 mg Rectal Q6H PRN    polyethylene glycol (MIRALAX) packet 17 g  17 g Oral DAILY PRN    ondansetron (ZOFRAN) injection 4 mg  4 mg IntraVENous Q6H PRN    famotidine (PEPCID) tablet 20 mg  20 mg Oral BID PRN    albuterol-ipratropium (DUO-NEB) 2.5 MG-0.5 MG/3 ML  3 mL Nebulization Q4H PRN    amLODIPine (NORVASC) tablet 5 mg  5 mg Oral QHS    aspirin delayed-release tablet 81 mg  81 mg Oral DAILY    cholecalciferol (VITAMIN D3) (1000 Units /25 mcg) tablet 2,000 Units  2,000 Units Oral DAILY    cyanocobalamin (VITAMIN B12) tablet 5,000 mcg  5,000 mcg Oral DAILY    dapsone tablet 100 mg  100 mg Oral DAILY    famotidine (PEPCID) tablet 20 mg  20 mg Oral BID    leucovorin calcium (WELLCOVORIN) tablet 5 mg  5 mg Oral Q7D    levothyroxine (SYNTHROID) tablet 100 mcg  100 mcg Oral ACB    [START ON 5/27/2022] methotrexate (RHEUMATREX) tablet 20 mg  20 mg Oral every Friday    potassium chloride SR (KLOR-CON 10) tablet 10 mEq  10 mEq Oral BID    pravastatin (PRAVACHOL) tablet 20 mg  20 mg Oral QHS    zolpidem (AMBIEN) tablet 2.5 mg  2.5 mg Oral QHS    methylPREDNISolone (PF) (SOLU-MEDROL) injection 60 mg  60 mg IntraVENous Q8H    azithromycin (ZITHROMAX) 500 mg in 0.9% sodium chloride 250 mL ([id number])  500 mg IntraVENous Q24H     ______________________________________________________________________  EXPECTED LENGTH OF STAY: - - -  ACTUAL LENGTH OF STAY:          1                 Radha Mcknight MD

## 2022-05-21 NOTE — PROGRESS NOTES
0800: Bedside shift change report given to [address] RN by Bib Velasquez RN (offgoing nurse). Report included the following information SBAR, Kardex, ED Summary, Intake/Output, MAR, Recent Results, Med Rec Status and Cardiac Rhythm NSR.      Problem: General Medical Care Plan  Goal: *Vital signs within specified parameters  Outcome: Progressing Towards Goal  Goal: *Labs within defined limits  Outcome: Progressing Towards Goal  Goal: *Absence of infection signs and symptoms  Outcome: Progressing Towards Goal  Goal: *Optimal pain control at patient's stated goal  Outcome: Progressing Towards Goal  Goal: *Skin integrity maintained  Outcome: Progressing Towards Goal  Goal: *Fluid volume balance  Outcome: Progressing Towards Goal  Goal: *Optimize nutritional status  Outcome: Progressing Towards Goal  Goal: *Anxiety reduced or absent  Outcome: Progressing Towards Goal  Goal: *Progressive mobility and function (eg: ADL's)  Outcome: Progressing Towards Goal

## 2022-05-22 ENCOUNTER — APPOINTMENT (OUTPATIENT)
Dept: NON INVASIVE DIAGNOSTICS | Age: 67
DRG: 315 | End: 2022-05-22
Attending: FAMILY MEDICINE
Payer: MEDICARE

## 2022-05-22 LAB
ECHO AO ROOT DIAM: 3 CM
ECHO AO ROOT INDEX: 1.75 CM/M2
ECHO AV AREA PEAK VELOCITY: 2 CM2
ECHO AV AREA VTI: 2.5 CM2
ECHO AV AREA/BSA PEAK VELOCITY: 1.2 CM2/M2
ECHO AV AREA/BSA VTI: 1.5 CM2/M2
ECHO AV MEAN GRADIENT: 12 MMHG
ECHO AV MEAN VELOCITY: 1.6 M/S
ECHO AV PEAK GRADIENT: 26 MMHG
ECHO AV PEAK VELOCITY: 2.6 M/S
ECHO AV VELOCITY RATIO: 0.54
ECHO AV VTI: 41.7 CM
ECHO EST RA PRESSURE: 3 MMHG
ECHO LA DIAMETER INDEX: 1.17 CM/M2
ECHO LA DIAMETER: 2 CM
ECHO LA TO AORTIC ROOT RATIO: 0.67
ECHO LV E' LATERAL VELOCITY: 9 CM/S
ECHO LV E' SEPTAL VELOCITY: 6 CM/S
ECHO LV FRACTIONAL SHORTENING: 32 % (ref 28–44)
ECHO LV INTERNAL DIMENSION DIASTOLE INDEX: 1.64 CM/M2
ECHO LV INTERNAL DIMENSION DIASTOLIC: 2.8 CM (ref 3.9–5.3)
ECHO LV INTERNAL DIMENSION SYSTOLIC INDEX: 1.11 CM/M2
ECHO LV INTERNAL DIMENSION SYSTOLIC: 1.9 CM
ECHO LV IVSD: 1.4 CM (ref 0.6–0.9)
ECHO LV MASS 2D: 99.3 G (ref 67–162)
ECHO LV MASS INDEX 2D: 58.1 G/M2 (ref 43–95)
ECHO LV POSTERIOR WALL DIASTOLIC: 1 CM (ref 0.6–0.9)
ECHO LV RELATIVE WALL THICKNESS RATIO: 0.71
ECHO LVOT AREA: 3.5 CM2
ECHO LVOT AV VTI INDEX: 0.71
ECHO LVOT DIAM: 2.1 CM
ECHO LVOT MEAN GRADIENT: 4 MMHG
ECHO LVOT PEAK GRADIENT: 8 MMHG
ECHO LVOT PEAK VELOCITY: 1.4 M/S
ECHO LVOT STROKE VOLUME INDEX: 59.5 ML/M2
ECHO LVOT SV: 101.8 ML
ECHO LVOT VTI: 29.4 CM
ECHO MV A VELOCITY: 1.01 M/S
ECHO MV AREA PHT: 3.8 CM2
ECHO MV E DECELERATION TIME (DT): 198.4 MS
ECHO MV E VELOCITY: 0.71 M/S
ECHO MV E/A RATIO: 0.7
ECHO MV E/E' LATERAL: 7.89
ECHO MV E/E' RATIO (AVERAGED): 9.86
ECHO MV E/E' SEPTAL: 11.83
ECHO MV PRESSURE HALF TIME (PHT): 57.5 MS
ECHO PV MAX VELOCITY: 1.1 M/S
ECHO PV PEAK GRADIENT: 5 MMHG
ECHO RIGHT VENTRICULAR SYSTOLIC PRESSURE (RVSP): 28 MMHG
ECHO RV TAPSE: 2.2 CM (ref 1.7–?)
ECHO TV REGURGITANT MAX VELOCITY: 2.5 M/S
ECHO TV REGURGITANT PEAK GRADIENT: 25 MMHG

## 2022-05-22 PROCEDURE — 74011250636 HC RX REV CODE- 250/636: Performed by: FAMILY MEDICINE

## 2022-05-22 PROCEDURE — 65270000046 HC RM TELEMETRY

## 2022-05-22 PROCEDURE — 93306 TTE W/DOPPLER COMPLETE: CPT

## 2022-05-22 PROCEDURE — 74011000250 HC RX REV CODE- 250: Performed by: FAMILY MEDICINE

## 2022-05-22 PROCEDURE — 74011250637 HC RX REV CODE- 250/637: Performed by: FAMILY MEDICINE

## 2022-05-22 PROCEDURE — 74011250637 HC RX REV CODE- 250/637: Performed by: NURSE PRACTITIONER

## 2022-05-22 PROCEDURE — 74011250637 HC RX REV CODE- 250/637: Performed by: INTERNAL MEDICINE

## 2022-05-22 RX ORDER — DEXTROMETHORPHAN POLISTIREX 30 MG/5ML
30 SUSPENSION ORAL EVERY 12 HOURS
Status: DISCONTINUED | OUTPATIENT
Start: 2022-05-22 | End: 2022-05-23 | Stop reason: HOSPADM

## 2022-05-22 RX ADMIN — FAMOTIDINE 40 MG: 20 TABLET, FILM COATED ORAL at 21:26

## 2022-05-22 RX ADMIN — PRAVASTATIN SODIUM 20 MG: 20 TABLET ORAL at 21:26

## 2022-05-22 RX ADMIN — CYANOCOBALAMIN TAB 500 MCG 5000 MCG: 500 TAB at 09:13

## 2022-05-22 RX ADMIN — POTASSIUM CHLORIDE 10 MEQ: 750 TABLET, FILM COATED, EXTENDED RELEASE ORAL at 09:13

## 2022-05-22 RX ADMIN — FUROSEMIDE 40 MG: 40 TABLET ORAL at 09:15

## 2022-05-22 RX ADMIN — ASPIRIN 81 MG: 81 TABLET, COATED ORAL at 09:13

## 2022-05-22 RX ADMIN — METHYLPREDNISOLONE SODIUM SUCCINATE 60 MG: 40 INJECTION, POWDER, FOR SOLUTION INTRAMUSCULAR; INTRAVENOUS at 21:27

## 2022-05-22 RX ADMIN — SODIUM CHLORIDE, PRESERVATIVE FREE 10 ML: 5 INJECTION INTRAVENOUS at 21:28

## 2022-05-22 RX ADMIN — METHYLPREDNISOLONE SODIUM SUCCINATE 60 MG: 40 INJECTION, POWDER, FOR SOLUTION INTRAMUSCULAR; INTRAVENOUS at 14:12

## 2022-05-22 RX ADMIN — AMLODIPINE BESYLATE 5 MG: 5 TABLET ORAL at 21:26

## 2022-05-22 RX ADMIN — METHYLPREDNISOLONE SODIUM SUCCINATE 60 MG: 40 INJECTION, POWDER, FOR SOLUTION INTRAMUSCULAR; INTRAVENOUS at 07:05

## 2022-05-22 RX ADMIN — Medication 30 MG: at 21:29

## 2022-05-22 RX ADMIN — SPIRONOLACTONE 25 MG: 25 TABLET ORAL at 09:14

## 2022-05-22 RX ADMIN — AZITHROMYCIN DIHYDRATE 500 MG: 500 INJECTION, POWDER, LYOPHILIZED, FOR SOLUTION INTRAVENOUS at 21:27

## 2022-05-22 RX ADMIN — FAMOTIDINE 40 MG: 20 TABLET, FILM COATED ORAL at 09:14

## 2022-05-22 RX ADMIN — DAPSONE 100 MG: 100 TABLET ORAL at 09:14

## 2022-05-22 RX ADMIN — SODIUM CHLORIDE, PRESERVATIVE FREE 10 ML: 5 INJECTION INTRAVENOUS at 07:05

## 2022-05-22 RX ADMIN — LEVOTHYROXINE SODIUM 100 MCG: 0.1 TABLET ORAL at 07:05

## 2022-05-22 RX ADMIN — Medication 2000 UNITS: at 09:13

## 2022-05-22 RX ADMIN — POTASSIUM CHLORIDE 10 MEQ: 750 TABLET, FILM COATED, EXTENDED RELEASE ORAL at 21:26

## 2022-05-22 RX ADMIN — Medication 30 MG: at 03:08

## 2022-05-22 RX ADMIN — ZOLPIDEM TARTRATE 2.5 MG: 5 TABLET ORAL at 21:27

## 2022-05-22 NOTE — PROGRESS NOTES
[address] [facility] Adult  Hospitalist Group                                                                                          Hospitalist Progress Note  Aroldo Dc MD  Answering service: [phone] from in house phone        Date of Service:  2022  NAME:  Reji Craft  :  1955  MRN:  [id number]      Admission Summary:   Reji Craft is a 78 y.o. female who presents with Hypoxemia and shortness of breath. Per initial nursing assessment \"Pt arrives via EMS from home, c/o shortness of breath, home neb treatments not helping, states it was just making her jittery without any respiratory relief, pt states she was using her husbands O2 at home, at 4L without much help.  Per EMS pt received duo-neb albuterol 5mg en route with little change, currently O2 sats at 93% on 5L via NC. Recent dx of GPA vasculitis, meds managed by Dr. Chris Esteves.  Pt also has hx of [place] Palsy\"  She has a history of GPA vasculitis, aortic valve insufficiency, hypertension, rheumatoid arthritis, presenting by EMS for shortness of breath worsened progressively over the past month.  Says that her prednisone was recently tapered down from 40 mg to 30 mg that her vasculitis has been managed by rheumatology locally.  Normally does not use home oxygen and says that she was 89% on room air, EMS administered a DuoNeb prior to arrival without any improvement and she arrived requiring 5 L nasal cannula to maintain oxygen saturations of 93%.  Has been vaccinated against COVID-19.  She denies any history of structural heart disease to her knowledge, denies any associated fevers, chest pain, abdominal pain, vomiting.  No other systemic complaints.  Symptoms are severe in nature without alleviating factors. Interval history / Subjective:   Patient feeling a bit better- wean oxygen as tolerated  Consulted pulmonary and recs noted     Assessment & Plan:     1.  Hypoxemia- due to inflammatory lung disease and pulmonary hypertension  Patient is on chronic steroids and will need to be discharged home on oxygen due to these 2 chronic conditions. She is willing to use oxygen as instructed  CT chest reviewed- dilated pulmonary arteries noted- consistent with pulm HTN  Cont nebs- PRN- SED rate and CRP unremarkable,  ECHO showing high normal PaPressure after aggressive diuresis   rapid covid neg, stable/unremarkable ABG on admission-     antibiotics were given for prophylaxis but no evidence for pneumonia this admission  - IV azithromycin used inpatient, resumed home dapsone, transition from  IV steroids to PO steroids, diuretics doses were increased- Appreciate pulmonary input  Recent admission at [facility] for similar symptoms  2. systemic vasculitis - managed by her rheumatologist  Recently being weaned down from steroids/ prednisone  - hold prednisone and cont IV solumedrol  Resume home rheum meds- prolia injections and methotrexate  3. CV- elevated BP and HR on admission- heart murmur  -suspected aortic stenosis or sclerosis-   Increased home dose diuretic and added lasix, ECHO ordered, monitor on tele  EKG reviewed, normal troponin, BNP unremarkable  4. Leukocytosis with neurophilia-likely steroid effect- monitor  5. Hyperglycemia from steroids- monitor    6. GERD- cont pepcid and added TUMS     Code status: full  Prophylaxis: scds  Care Plan discussed with: patient  Anticipated Disposition: 24-48hrs     Hospital Problems  Date Reviewed: 9/28/2021          Codes Class Noted POA    Hypoxemia ICD-10-CM: R09.02  ICD-9-CM: 799.02  5/20/2022 Unknown                Review of Systems:   A comprehensive review of systems was negative except for that written in the HPI. Vital Signs:    Last 24hrs VS reviewed since prior progress note.  Most recent are:  Visit Vitals  BP (!) 160/82 (BP 1 Location: Right upper arm, BP Patient Position: Other (Comment))   Pulse 72   Temp 98.4 °F (36.9 °C)   Resp 14   Ht 5' 6\" (1.676 m)   Wt 63.4 kg (139 lb 12.4 oz)   SpO2 95%   BMI 22.56 kg/m²     Patient Vitals for the past 24 hrs:   Temp Pulse Resp BP SpO2   05/22/22 0600  72      05/22/22 0400  68      05/22/22 0307 98.4 °F (36.9 °C) 82 14 (!) 160/82 95 %   05/21/22 2200  86      05/21/22 2000  90      05/21/22 1943 98.8 °F (37.1 °C) 85 19 (!) 151/69 94 %   05/21/22 1800  97      05/21/22 1604 98.7 °F (37.1 °C) 94 18 (!) 144/69 94 %   05/21/22 1400  88      05/21/22 1200  89      05/21/22 1142 98.9 °F (37.2 °C) 91 20 128/63 95 %   05/21/22 1000  88      05/21/22 0810 98.4 °F (36.9 °C) 81 16 (!) 140/69 96 %       Intake/Output Summary (Last 24 hours) at 5/22/2022 0748  Last data filed at 5/21/2022 1724  Gross per 24 hour   Intake    Output 825 ml   Net -825 ml        Physical Examination:          Constitutional:  No acute distress, cooperative, pleasant    ENT:  Oral mucosa moist, oropharynx benign. Resp:  CTA bilaterally. No wheezing/rhonchi/rales. Decreased breath sounds, mild dyspnea    CV:  Regular rhythm, normal rate, 3/6 SE murmurs,    GI:  Soft, non distended, non tender. normoactive bowel sounds,     Musculoskeletal:  No edema, warm, 2+ pulses throughout    Neurologic:  Moves all extremities. AAOx3, CN II-XII reviewed            Data Review:    Review and/or order of clinical lab test  Review and/or order of tests in the radiology section of CPT  Review and/or order of tests in the medicine section of CPT    CT Results  (Last 48 hours)               05/20/22 1733  CTA CHEST W OR W WO CONT Final result    Impression:  No evidence for pulmonary embolism. Narrative:  History: Shortness of breath, vasculitis, possible pulmonary embolism. CTA of the chest was performed. 100 mL Isovue-370 were injected and scanning   was performed during the arterial phase of contrast administration. Post   processing was performed. 3D reconstructions were performed.   CT dose reduction   was achieved through use of a standardized protocol tailored for this   examination and automatic exposure control for dose modulation. There are no intraluminal filling defects to suggest pulmonary embolism. The   heart, pericardium, and great vessels appear unremarkable. The chest wall and   axilla appear unremarkable. Mild atelectasis in the lower lobes. Labs:     Recent Labs     05/20/22  1352   WBC 15.0*   HGB 12.6   HCT 41.5        Recent Labs     05/20/22  1352      K 3.6      CO2 28   BUN 17   CREA 0.66   *   CA 9.2     Recent Labs     05/20/22  1352   ALT 31   AP 78   TBILI 0.9   TP 7.0   ALB 3.7   GLOB 3.3     No results for input(s): INR, PTP, APTT, INREXT, INREXT in the last 72 hours. No results for input(s): FE, TIBC, PSAT, FERR in the last 72 hours. No results found for: FOL, RBCF   No results for input(s): PH, PCO2, PO2 in the last 72 hours. No results for input(s): CPK, CKNDX, TROIQ in the last 72 hours.     No lab exists for component: CPKMB  Lab Results   Component Value Date/Time    Cholesterol, total 165 04/15/2016 08:45 AM    HDL Cholesterol 70 04/15/2016 08:45 AM    LDL, calculated 78 04/15/2016 08:45 AM    Triglyceride 86 04/15/2016 08:45 AM     No results found for: [facility]  Lab Results   Component Value Date/Time    Color YELLOW/STRAW 05/14/2021 09:52 AM    Appearance CLEAR 05/14/2021 09:52 AM    Specific gravity 1.017 05/14/2021 09:52 AM    pH (UA) 7.0 05/14/2021 09:52 AM    Protein TRACE (A) 05/14/2021 09:52 AM    Glucose 100 (A) 05/14/2021 09:52 AM    Ketone Negative 05/14/2021 09:52 AM    Bilirubin Negative 05/14/2021 09:52 AM    Urobilinogen 0.2 05/14/2021 09:52 AM    Nitrites Negative 05/14/2021 09:52 AM    Leukocyte Esterase Negative 05/14/2021 09:52 AM    Epithelial cells FEW 05/14/2021 09:52 AM    Bacteria Negative 05/14/2021 09:52 AM    WBC 0-4 05/14/2021 09:52 AM    RBC 0-5 05/14/2021 09:52 AM         Medications Reviewed:     Current Facility-Administered Medications   Medication Dose Route Frequency    dextromethorphan (DELSYM) 30 mg/5 mL syrup 30 mg  30 mg Oral Q12H    spironolactone (ALDACTONE) tablet 25 mg  25 mg Oral DAILY    furosemide (LASIX) tablet 40 mg  40 mg Oral DAILY    calcium carbonate (TUMS) chewable tablet 400 mg [elemental]  400 mg Oral Q4H PRN    famotidine (PEPCID) tablet 40 mg  40 mg Oral BID    sodium chloride (NS) flush 5-40 mL  5-40 mL IntraVENous Q8H    sodium chloride (NS) flush 5-40 mL  5-40 mL IntraVENous PRN    acetaminophen (TYLENOL) tablet 650 mg  650 mg Oral Q6H PRN    Or    acetaminophen (TYLENOL) suppository 650 mg  650 mg Rectal Q6H PRN    polyethylene glycol (MIRALAX) packet 17 g  17 g Oral DAILY PRN    ondansetron (ZOFRAN) injection 4 mg  4 mg IntraVENous Q6H PRN    famotidine (PEPCID) tablet 20 mg  20 mg Oral BID PRN    albuterol-ipratropium (DUO-NEB) 2.5 MG-0.5 MG/3 ML  3 mL Nebulization Q4H PRN    amLODIPine (NORVASC) tablet 5 mg  5 mg Oral QHS    aspirin delayed-release tablet 81 mg  81 mg Oral DAILY    cholecalciferol (VITAMIN D3) (1000 Units /25 mcg) tablet 2,000 Units  2,000 Units Oral DAILY    cyanocobalamin (VITAMIN B12) tablet 5,000 mcg  5,000 mcg Oral DAILY    dapsone tablet 100 mg  100 mg Oral DAILY    leucovorin calcium (WELLCOVORIN) tablet 5 mg  5 mg Oral Q7D    levothyroxine (SYNTHROID) tablet 100 mcg  100 mcg Oral ACB    [START ON 5/27/2022] methotrexate (RHEUMATREX) tablet 20 mg  20 mg Oral every Friday    potassium chloride SR (KLOR-CON 10) tablet 10 mEq  10 mEq Oral BID    pravastatin (PRAVACHOL) tablet 20 mg  20 mg Oral QHS    zolpidem (AMBIEN) tablet 2.5 mg  2.5 mg Oral QHS    methylPREDNISolone (PF) (SOLU-MEDROL) injection 60 mg  60 mg IntraVENous Q8H    azithromycin (ZITHROMAX) 500 mg in 0.9% sodium chloride 250 mL ([id number])  500 mg IntraVENous Q24H     ______________________________________________________________________  EXPECTED LENGTH OF STAY: - - -  ACTUAL LENGTH OF STAY:          2                 Kelly Vaughn MD

## 2022-05-22 NOTE — PROGRESS NOTES
0800: Bedside shift change report given to [address] [place] [address] (oncoming nurse) by Gaye Sarabia (offgoing nurse). Report included the following information SBAR, Kardex, ED Summary, Intake/Output, MAR, Recent Results and Cardiac Rhythm NSR.
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
## 2022-05-23 VITALS
OXYGEN SATURATION: 93 % | RESPIRATION RATE: 14 BRPM | TEMPERATURE: 98.5 F | HEIGHT: 66 IN | BODY MASS INDEX: 23.28 KG/M2 | DIASTOLIC BLOOD PRESSURE: 58 MMHG | SYSTOLIC BLOOD PRESSURE: 115 MMHG | HEART RATE: 93 BPM | WEIGHT: 144.84 LBS

## 2022-05-23 LAB
ANION GAP SERPL CALC-SCNC: 4 MMOL/L (ref 5–15)
BUN SERPL-MCNC: 18 MG/DL (ref 6–20)
BUN/CREAT SERPL: 33 (ref 12–20)
CALCIUM SERPL-MCNC: 7.9 MG/DL (ref 8.5–10.1)
CHLORIDE SERPL-SCNC: 103 MMOL/L (ref 97–108)
CO2 SERPL-SCNC: 31 MMOL/L (ref 21–32)
CREAT SERPL-MCNC: 0.55 MG/DL (ref 0.55–1.02)
ERYTHROCYTE [DISTWIDTH] IN BLOOD BY AUTOMATED COUNT: 15.7 % (ref 11.5–14.5)
GLUCOSE SERPL-MCNC: 137 MG/DL (ref 65–100)
HCT VFR BLD AUTO: 36.1 % (ref 35–47)
HGB BLD-MCNC: 11.7 G/DL (ref 11.5–16)
MCH RBC QN AUTO: 32.7 PG (ref 26–34)
MCHC RBC AUTO-ENTMCNC: 32.4 G/DL (ref 30–36.5)
MCV RBC AUTO: 100.8 FL (ref 80–99)
NRBC # BLD: 0 K/UL (ref 0–0.01)
NRBC BLD-RTO: 0 PER 100 WBC
PLATELET # BLD AUTO: 324 K/UL (ref 150–400)
PMV BLD AUTO: 9.5 FL (ref 8.9–12.9)
POTASSIUM SERPL-SCNC: 3.4 MMOL/L (ref 3.5–5.1)
RBC # BLD AUTO: 3.58 M/UL (ref 3.8–5.2)
SODIUM SERPL-SCNC: 138 MMOL/L (ref 136–145)
WBC # BLD AUTO: 12.2 K/UL (ref 3.6–11)

## 2022-05-23 PROCEDURE — 74011250637 HC RX REV CODE- 250/637: Performed by: INTERNAL MEDICINE

## 2022-05-23 PROCEDURE — 74011000250 HC RX REV CODE- 250: Performed by: FAMILY MEDICINE

## 2022-05-23 PROCEDURE — 36415 COLL VENOUS BLD VENIPUNCTURE: CPT

## 2022-05-23 PROCEDURE — 85027 COMPLETE CBC AUTOMATED: CPT

## 2022-05-23 PROCEDURE — 74011250637 HC RX REV CODE- 250/637: Performed by: FAMILY MEDICINE

## 2022-05-23 PROCEDURE — 74011250636 HC RX REV CODE- 250/636: Performed by: FAMILY MEDICINE

## 2022-05-23 PROCEDURE — 74011250637 HC RX REV CODE- 250/637: Performed by: NURSE PRACTITIONER

## 2022-05-23 PROCEDURE — 80048 BASIC METABOLIC PNL TOTAL CA: CPT

## 2022-05-23 RX ORDER — AZITHROMYCIN 250 MG/1
250 TABLET, FILM COATED ORAL DAILY
Qty: 6 TABLET | Refills: 0 | Status: SHIPPED | OUTPATIENT
Start: 2022-05-23 | End: 2022-06-13

## 2022-05-23 RX ORDER — FUROSEMIDE 40 MG/1
40 TABLET ORAL
Qty: 12 TABLET | Refills: 2 | Status: SHIPPED | OUTPATIENT
Start: 2022-05-23 | End: 2022-06-22

## 2022-05-23 RX ORDER — SPIRONOLACTONE 25 MG/1
25 TABLET ORAL DAILY
Qty: 30 TABLET | Refills: 2 | Status: SHIPPED | OUTPATIENT
Start: 2022-05-23

## 2022-05-23 RX ORDER — FUROSEMIDE 40 MG/1
40 TABLET ORAL
Qty: 12 TABLET | Refills: 2 | Status: SHIPPED | OUTPATIENT
Start: 2022-05-23 | End: 2022-05-23

## 2022-05-23 RX ORDER — PREDNISONE 20 MG/1
40 TABLET ORAL
Qty: 60 TABLET | Refills: 0 | Status: SHIPPED | OUTPATIENT
Start: 2022-05-23

## 2022-05-23 RX ADMIN — SPIRONOLACTONE 25 MG: 25 TABLET ORAL at 09:17

## 2022-05-23 RX ADMIN — ASPIRIN 81 MG: 81 TABLET, COATED ORAL at 09:16

## 2022-05-23 RX ADMIN — CYANOCOBALAMIN TAB 500 MCG 5000 MCG: 500 TAB at 09:16

## 2022-05-23 RX ADMIN — DAPSONE 100 MG: 100 TABLET ORAL at 09:17

## 2022-05-23 RX ADMIN — Medication 2000 UNITS: at 09:17

## 2022-05-23 RX ADMIN — LEVOTHYROXINE SODIUM 100 MCG: 0.1 TABLET ORAL at 07:38

## 2022-05-23 RX ADMIN — FAMOTIDINE 40 MG: 20 TABLET, FILM COATED ORAL at 09:17

## 2022-05-23 RX ADMIN — Medication 30 MG: at 09:17

## 2022-05-23 RX ADMIN — FUROSEMIDE 40 MG: 40 TABLET ORAL at 09:16

## 2022-05-23 RX ADMIN — METHYLPREDNISOLONE SODIUM SUCCINATE 60 MG: 40 INJECTION, POWDER, FOR SOLUTION INTRAMUSCULAR; INTRAVENOUS at 06:17

## 2022-05-23 RX ADMIN — POTASSIUM CHLORIDE 10 MEQ: 750 TABLET, FILM COATED, EXTENDED RELEASE ORAL at 09:16

## 2022-05-23 RX ADMIN — SODIUM CHLORIDE, PRESERVATIVE FREE 10 ML: 5 INJECTION INTRAVENOUS at 06:17

## 2022-05-23 NOTE — PROGRESS NOTES
Hospital follow-up PCP transitional care appointment has been scheduled with Dr.Margaret Zonia Baker forcThursday, May 26th, 2022 at 2:30 p.mSloane Hernandez Pending patient discharge.   Srikanth Pagan, Care Management Assistant

## 2022-05-23 NOTE — PROGRESS NOTES
PHIL: Discharge patient home with home O2 through [facility]. Transportation in car with family. Reason for Admission:  hypoxemia                     RUR Score:  10%                   Plan for utilizing home health:    N/A      PCP: First and Last name:  Josef Moore MD     Name of Practice:    Are you a current patient: Yes/No: yes   Approximate date of last visit: April 2022   Can you participate in a virtual visit with your PCP:                     Current Advanced Directive/Advance Care Plan: Full Code      Healthcare Decision Maker:   Click here to complete [address] including selection of the Healthcare Decision Maker Relationship (ie \"Primary\")                        Transition of Care Plan:  CM met with patient at bedside. Patient is alert and oriented x4. Demographics confirmed. Patient resides with her spouse and adult daughter in a one level home with 6 steps to enter. No DME. Hx: aortic valve insufficiency, HTN, GERD, hyperlipidemia, hypothyroidism, meniere's disease, rheumatoid arthritis. PCP confirmed and pharmacy used is [facility] located [address] [place] [address] phone: [phone]. CM will need home O2. CM submitted referral via [facility] to [facility] and delivered O2 to patient. No barriers to discharge noted. Emergency contact: Yossi Shoulders, , [phone]    Medicare pt has received, reviewed, and signed 2nd IM letter informing them of their right to appeal the discharge. Signed copied has been placed on pt bedside chart. Care Management Interventions  PCP Verified by CM: Yes (Dr. Lacie France)  Mode of Transport at Discharge:  Other (see comment) (in car with daughter)  Transition of Care Consult (CM Consult): DME/Supply Assistance  MyChart Signup: Yes  Discharge Durable Medical Equipment: Yes  Physical Therapy Consult: No  Occupational Therapy Consult: No  Speech Therapy Consult: No  Support Systems: Child(joyce),Spouse/Significant Other  Confirm Follow Up Transport: Family  The Plan for Transition of Care is Related to the Following Treatment Goals : home  The Patient and/or Patient Representative was Provided with a Choice of Provider and Agrees with the Discharge Plan?: Yes  Freedom of Choice List was Provided with Basic Dialogue that Supports the Patient's Individualized Plan of Care/Goals, Treatment Preferences and Shares the Quality Data Associated with the Providers?: Yes  Discharge Location  Patient Expects to be Discharged to[de-identified] [id number] [address] [place] [address], [address] [facility] Floor  [phone]

## 2022-05-23 NOTE — DISCHARGE SUMMARY
Discharge Summary       PATIENT ID: Elvis Hsu  MRN: [id number]   YOB: 1955    DATE OF ADMISSION: 5/20/2022  1:38 PM    DATE OF DISCHARGE: 5/23/22 2pm  PRIMARY CARE PROVIDER: Francesca Grissom MD     ATTENDING PHYSICIAN: Homer Velasco  DISCHARGING PROVIDER: Kelly Vaughn MD    To contact this individual call [phone] and ask the  to page. If unavailable ask to be transferred the Adult Hospitalist Department. CONSULTATIONS: IP CONSULT TO HOSPITALIST  IP CONSULT TO PULMONOLOGY    PROCEDURES/SURGERIES: * No surgery found *    ADMISSION SUMMARY AND HOSPITAL COURSE:   Yesenia Lei a 78 y.o. female who presents with Hypoxemia and shortness of breath. Per initial nursing assessment \"Pt arrives via EMS from home, c/o shortness of breath, home neb treatments not helping, states it was just making her jittery without any respiratory relief, pt states she was using her husbands O2 at home, at 4L without much help.  Per EMS pt received duo-neb albuterol 5mg en route with little change, currently O2 sats at 93% on 5L via [place].   Recent dx of GPA vasculitis, meds managed by Dr. Terri Esteves.  Pt also has hx of [place] Palsy\"  She has a history of GPA vasculitis, aortic valve insufficiency, hypertension, rheumatoid arthritis, presenting by EMS for shortness of breath worsened progressively over the past month.  Says that her prednisone was recently tapered down from 40 mg to 30 mg that her vasculitis has been managed by rheumatology locally.  Normally does not use home oxygen and says that she was 89% on room air, EMS administered a DuoNeb prior to arrival without any improvement and she arrived requiring 5 L nasal cannula to maintain oxygen saturations of 93%.  Has been vaccinated against COVID-19.  She denies any history of structural heart disease to her knowledge, denies any associated fevers, chest pain, abdominal pain, vomiting.  No other systemic complaints.  Symptoms are severe in nature without alleviating factors. DISCHARGE DIAGNOSES / PLAN:      1. Hypoxemia- due to inflammatory lung disease and pulmonary hypertension  Patient is on chronic steroids and will need to be discharged home on oxygen due to these 2 chronic conditions. She is willing to use oxygen as instructed  CT chest reviewed- dilated pulmonary arteries noted- consistent with pulm HTN  Cont nebs- PRN- SED rate and CRP unremarkable,  ECHO showing high normal PaPressure after aggressive diuresis   rapid covid neg, stable/unremarkable ABG on admission-     antibiotics were given for prophylaxis but no evidence for pneumonia this admission  - IV azithromycin used inpatient, resumed home dapsone, transition from  IV steroids to PO steroids, diuretics doses were increased- Appreciate pulmonary input  Recent admission at [facility] for similar symptoms  2. systemic vasculitis - managed by her rheumatologist  Recently being weaned down from steroids/ prednisone  - hold prednisone and cont IV solumedrol  Resume home rheum meds- prolia injections and methotrexate  3. CV- elevated BP and HR on admission- heart murmur  -suspected aortic stenosis or sclerosis-   Increased home dose diuretic and added lasix, ECHO ordered, monitor on tele  EKG reviewed, normal troponin, BNP unremarkable  4. Leukocytosis with neurophilia-likely steroid effect- monitor  5. Hyperglycemia from steroids- monitor    6.  GERD- cont pepcid and added TUMS    PENDING TEST RESULTS:   At the time of discharge the following test results are still pending:none     ADDITIONAL CARE RECOMMENDATIONS:   Continue oral azithromycin and oral prednisone as instructed  We increased your diuretic doses and added lasix 40mg 3 times weekly(M/W/F)  Get your cardiologist or primary care MD to recheck your potassium levels in 2-3 weeks  Follow up with pulmonary and cardiology as instructed  Home oxygen to be set up  Monitor oxygen need with a finger pulse oximeter- keep So2 levels 90-95%  Wear oxygen at night when sleeping     NOTIFY YOUR PHYSICIAN FOR ANY OF THE FOLLOWING:   Fever over 101 degrees for 24 hours. Chest pain, shortness of breath, fever, chills, nausea, vomiting, diarrhea, change in mentation, falling, weakness, bleeding. Severe pain or pain not relieved by medications, as well as any other signs or symptoms that you may have questions about. FOLLOW UP APPOINTMENTS:    Follow-up Information     Follow up With Specialties Details Why MD [address]  [place] [phone]           DIET: Regular Diet  ACTIVITY: Activity as tolerated  EQUIPMENT needed: home oxygen    DISCHARGE MEDICATIONS:  Current Discharge Medication List      START taking these medications    Details   azithromycin (ZITHROMAX) 250 mg tablet Take 1 Tablet by mouth daily. Qty: 6 Tablet, Refills: 0  Start date: 5/23/2022      furosemide (LASIX) 40 mg tablet Take 1 Tablet by mouth every Monday, Wednesday, Friday for 30 days. Qty: 12 Tablet, Refills: 2  Start date: 5/23/2022, End date: 6/22/2022         CONTINUE these medications which have CHANGED    Details   predniSONE (DELTASONE) 20 mg tablet Take 2 Tablets by mouth daily (with breakfast). Qty: 60 Tablet, Refills: 0  Start date: 5/23/2022      spironolactone (ALDACTONE) 25 mg tablet Take 1 Tablet by mouth daily. Qty: 30 Tablet, Refills: 2  Start date: 5/23/2022         CONTINUE these medications which have NOT CHANGED    Details   famotidine (PEPCID) 20 mg tablet Take 20 mg by mouth two (2) times a day. levothyroxine (SYNTHROID) 100 mcg tablet Take 100 mcg by mouth Daily (before breakfast). dapsone 100 mg tablet Take 100 mg by mouth daily. cyanocobalamin 1,000 mcg tablet Take 5,000 mcg by mouth daily. pravastatin (PRAVACHOL) 20 mg tablet Take 20 mg by mouth nightly.       potassium chloride SR (KLOR-CON 10) 10 mEq tablet TAKE 2 TABLETS TWICE A DAY  Qty: 360 Tab, Refills: 3      KRILL OIL PO Take 350 mg by mouth every morning. aspirin delayed-release 81 mg tablet Take  by mouth daily. methotrexate (RHEUMATREX) 2.5 mg tablet Take 20 mg by mouth Every Friday. Associated Diagnoses: PVC (premature ventricular contraction)      amLODIPine (NORVASC) 5 mg tablet Take 5 mg by mouth nightly. Associated Diagnoses: PVC (premature ventricular contraction)      zolpidem (AMBIEN) 10 mg tablet Take 3.33 mg by mouth nightly as needed for Sleep (Splits tablet into thirds). Associated Diagnoses: PVC (premature ventricular contraction)      cholecalciferol, vitamin D3, (VITAMIN D3) 2,000 unit tab Take 1 Tablet by mouth daily. Associated Diagnoses: PVC (premature ventricular contraction)      denosumab (Prolia) 60 mg/mL injection 60 mg by SubCUTAneous route every 6 months. leucovorin calcium (WELLCOVORIN) 5 mg tablet Take  by mouth every seven (7) days.  Saturday    Associated Diagnoses: PVC (premature ventricular contraction)             DISPOSITION:    Home With:   OT  PT  HH  RN       Long term SNF/Inpatient Rehab   x Independent/assisted living    Hospice    Other:       PATIENT CONDITION AT DISCHARGE:     Functional status    Poor     Deconditioned    x Independent      Cognition   x  Lucid     Forgetful     Dementia      Catheters/lines (plus indication)    Moise     PICC     PEG    x None      Code status   x  Full code     DNR      PHYSICAL EXAMINATION AT DISCHARGE:  Patient Vitals for the past 24 hrs:   Temp Pulse Resp BP SpO2   05/23/22 1212 98.5 °F (36.9 °C) 93 14 (!) 115/58 93 %   05/23/22 1004  83      05/23/22 0807 98.5 °F (36.9 °C) 78 15 (!) 158/71 93 %   05/23/22 0600  90      05/23/22 0400  59      05/23/22 0331 98.7 °F (37.1 °C) 73 18 (!) 111/58 95 %   05/23/22 0200  85      05/22/22 2359 98.9 °F (37.2 °C) 87 20 (!) 152/74 91 %   05/22/22 2200  74      05/22/22 2120 98.9 °F (37.2 °C) 82 21 (!) 149/69 90 %   05/22/22 2000  84    05/22/22 1800  87      05/22/22 1528 99.7 °F (37.6 °C) 87 18 130/60 93 %                                    Constitutional:  No acute distress, cooperative, pleasant    ENT:  Oral mucosa moist, oropharynx benign. Resp:  CTA bilaterally. No wheezing/rhonchi/rales. Decreased breath sounds, mild dyspnea    CV:  Regular rhythm, normal rate, 3/6 SE murmurs,    GI:  Soft, non distended, non tender. normoactive bowel sounds,     Musculoskeletal:  No edema, warm, 2+ pulses throughout    Neurologic:  Moves all extremities. AAOx3, CN II-XII reviewed                                  Data Review:    Review and/or order of clinical lab test  Review and/or order of tests in the radiology section of CPT  Review and/or order of tests in the medicine section of CPT             CT Results  (Last 48 hours)                 05/20/22 1733   CTA CHEST W OR W WO CONT Final result     Impression:   No evidence for pulmonary embolism.                Narrative:   History: Shortness of breath, vasculitis, possible pulmonary embolism. CTA of the chest was performed. 100 mL Isovue-370 were injected and scanning   was performed during the arterial phase of contrast administration. Post   processing was performed. 3D reconstructions were performed. CT dose reduction   was achieved through use of a standardized protocol tailored for this   examination and automatic exposure control for dose modulation. There are no intraluminal filling defects to suggest pulmonary embolism. The   heart, pericardium, and great vessels appear unremarkable. The chest wall and   axilla appear unremarkable.  Mild atelectasis in the lower lobes.                    Labs:          Recent Labs     05/20/22  1352   WBC 15.0*   HGB 12.6   HCT 41.5             Recent Labs     05/20/22  1352      K 3.6      CO2 28   BUN 17   CREA 0.66   *   CA 9.2          Recent Labs     05/20/22  1352   ALT 31   AP 78   TBILI 0.9   TP 7.0 ALB 3.7   GLOB 3.3      No results for input(s): INR, PTP, APTT, INREXT, INREXT in the last 72 hours. No results for input(s): FE, TIBC, PSAT, FERR in the last 72 hours. No results found for: FOL, RBCF   No results for input(s): PH, PCO2, PO2 in the last 72 hours. No results for input(s): CPK, CKNDX, TROIQ in the last 72 hours.     No lab exists for component: CPKMB        Lab Results   Component Value Date/Time     Cholesterol, total 165 04/15/2016 08:45 AM     HDL Cholesterol 70 04/15/2016 08:45 AM     LDL, calculated 78 04/15/2016 08:45 AM     Triglyceride 86 04/15/2016 08:45 AM      No results found for: [facility]        Lab Results   Component Value Date/Time     Color YELLOW/STRAW 05/14/2021 09:52 AM     Appearance CLEAR 05/14/2021 09:52 AM     Specific gravity 1.017 05/14/2021 09:52 AM     pH (UA) 7.0 05/14/2021 09:52 AM     Protein TRACE (A) 05/14/2021 09:52 AM     Glucose 100 (A) 05/14/2021 09:52 AM     Ketone Negative 05/14/2021 09:52 AM     Bilirubin Negative 05/14/2021 09:52 AM     Urobilinogen 0.2 05/14/2021 09:52 AM     Nitrites Negative 05/14/2021 09:52 AM     Leukocyte Esterase Negative 05/14/2021 09:52 AM     Epithelial cells FEW 05/14/2021 09:52 AM     Bacteria Negative 05/14/2021 09:52 AM     WBC 0-4 05/14/2021 09:52 AM     RBC 0-5 05/14/2021 09:52 AM      Echo Interpretation Summary-5/22/22    Result status: Final result       Left Ventricle: Normal left ventricular systolic function with a visually estimated EF of 60 - 65%. Left ventricle size is normal. Moderate septal thickening. Findings consistent with moderate asymmetric hypertrophy. Normal wall motion. Diastolic dysfunction present with normal LV EF.       Echo Findings    Left Ventricle Normal left ventricular systolic function with a visually estimated EF of 60 - 65%. Left ventricle size is normal. Moderate septal thickening. Findings consistent with moderate asymmetric hypertrophy. Normal wall motion.  Diastolic dysfunction present with normal LV EF. Left Atrium Left atrium size is normal.   Right Ventricle Right ventricle size is normal. Normal systolic function. Right Atrium Right atrium size is normal.   Aortic Valve Valve structure is normal. No regurgitation. No stenosis. Mitral Valve Valve structure is normal. No regurgitation. No stenosis noted. Tricuspid Valve Valve structure is normal. Mild regurgitation. The estimated RVSP is 28 mmHgmmHg. No stenosis noted. Pulmonic Valve The pulmonic valve was not well visualized. Mild regurgitation. No stenosis noted. Aorta Normal sized aortic root. Pericardium No pericardial effusion. 5/21/22- Interpretation Summary    · No evidence of thrombosis in either lower extremity. Lower Extremity Venous Findings      Right Lower Venous    The common femoral, great saphenous, profunda femoral, femoral, popliteal, gastrocnemius, posterior tibial, peroneal and saphenous femoral junction vein(s) were imaged in the transverse and longitudinal planes. The vessels showed normal color filling and compressibility. Doppler interrogation of the veins showed phasic and spontaneous flow. Left Lower Venous    The common femoral, great saphenous, saphenous femoral junction, profunda femoral, femoral, popliteal, gastrocnemius, posterior tibial and peroneal vein(s) were imaged in the transverse and longitudinal planes. The vessels showed normal color filling and compressibility. Doppler interrogation of the veins showed phasic and spontaneous flow.            CHRONIC MEDICAL DIAGNOSES:  Problem List as of 5/23/2022 Date Reviewed: 9/28/2021          Codes Class Noted - Resolved    Hypoxemia ICD-10-CM: R09.02  ICD-9-CM: 799.02  5/20/2022 - Present        Fracture of base of fifth metatarsal bone at metaphyseal-diaphyseal junction ICD-10-CM: C73.027L  ICD-9-CM: 825.25  2/25/2021 - Present        Nonrheumatic aortic valve insufficiency ICD-10-CM: I35.1  ICD-9-CM: 424.1  2/12/2018 - Present Coronary artery disease involving native coronary artery of native heart without angina pectoris ICD-10-CM: I25.10  ICD-9-CM: 414.01  2/12/2018 - Present        Dyslipidemia ICD-10-CM: E78.5  ICD-9-CM: 272.4  2/12/2018 - Present        PVC (premature ventricular contraction) ICD-10-CM: I49.3  ICD-9-CM: 427.69  2/12/2018 - Present        Hypokalemia ICD-10-CM: E87.6  ICD-9-CM: 276.8  10/2/2017 - Present        RA (rheumatoid arthritis) ([facility] [address] ICD-10-CM: M06.9  ICD-9-CM: 714.0  5/19/2015 - Present        HTN (hypertension) ICD-10-CM: I10  ICD-9-CM: 401.9  5/19/2015 - Present        Systolic ejection murmur RTC-22-SD: R01.1  ICD-9-CM: 785.2  5/19/2015 - Present        Dizziness ICD-10-CM: R42  ICD-9-CM: 780.4  5/19/2015 - Present              Greater than 30 minutes were spent with the patient on counseling and coordination of care    Signed:   James Ponce MD  5/23/2022  2:21 PM   .

## 2022-05-23 NOTE — PROGRESS NOTES
Pulse oximetry assessment   87% at rest on room air (if 88% or less, skip next steps)  83% while ambulating on room air  96% at rest on 2 LPM  92% while ambulating on 2 LPM
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
## 2022-05-23 NOTE — DISCHARGE INSTRUCTIONS
Continue oral azithromycin and oral prednisone as instructed  We increased your diuretic doses and added lasix 40mg 3 times weekly  Get your cardiologist or primary care MD to recheck your potassium levels in 2-3 weeks  Follow up with pulmonary and cardiology as instructed  Home oxygen to be set up  Monitor oxygen need with a finger pulse oximeter- keep So2 levels 90-95%  Wear oxygen at night when sleeping

## 2022-05-25 NOTE — PROGRESS NOTES
Physician Progress Note      William Tang  [facility] #:                  [id number]  :                       1955  ADMIT DATE:       2022 1:38 PM  [place] DATE:        2022 4:30 PM  RESPONDING  PROVIDER #:        Paula Choudhury MD        QUERY TEXT:    Type of Anemia: Please provide further specificity, if known. Clinical indicators include:  Options provided:  -- Anemia due to acute blood loss  -- Anemia due to chronic blood loss  -- Anemia due to iron deficiency  -- Anemia due to postoperative blood loss  -- Anemia due to chronic disease  -- Other - I will add my own diagnosis  -- Disagree - Not applicable / Not valid  -- Disagree - Clinically Unable to determine / Unknown        PROVIDER RESPONSE TEXT:    The patient has anemia due to chronic blood loss. QUERY TEXT:    Patient admitted with Hypoxemia. Noted documentation of acute hypoxic respiratory failure in consults note . In order to support the diagnosis of acute respiratory failure, please include additional clinical indicators in your documentation. Or please document if the diagnosis of acute respiratory failure has been ruled out after further study. The medical record reflects the following:  Risk Factors: 79 y/o female presents with SOB, using her husbands O2 at home, at 4L on chronic steroids. PMH of GPA vasculitis, RA,  Clinical Indicators: SOB, Sats 83-89% room air, pO2 209, HCO3 27.2, pH 7.46  Treatment: 3-5L NC O2, antibiotics- azithromycin, resume home dapsone, IV Solumedrol.  Resume home rheum meds- prolia injections , diuretics,    Acute Respiratory Failure Clinical Indicators per 3[employer] MS-DRG Training Guide and Quick Reference Guide:  pO2 < 60 mmHg or SpO2 (pulse oximetry) < 91% breathing room air  pCO2 > 50 and pH < 7.35  P/F ratio (pO2 / FIO2) < 300  pO2 decrease or pCO2 increase by 10 mmHg from baseline (if known)  Supplemental oxygen of 40% or more  Presence of respiratory distress, tachypnea, dyspnea, shortness of breath, wheezing  Unable to speak in complete sentences  Use of accessory muscles to breathe  Extreme anxiety and feeling of impending doom  Tripod position  Confusion/altered mental status/obtunded  Options provided:  -- Acute Respiratory Failure as evidenced by, Please document evidence. -- Acute Respiratory Failure ruled out after study  -- Acute Respiratory Failure ruled out after study and Chronic Respiratory Failure confirmed  -- Other - I will add my own diagnosis  -- Disagree - Not applicable / Not valid  -- Disagree - Clinically unable to determine / Unknown  -- Refer to Clinical Documentation Reviewer    PROVIDER RESPONSE TEXT:    Acute Respiratory Failure ruled out after study and Chronic Respiratory Failure confirmed. Query created by:  Gypsy Negron on 5/23/2022 3:11 PM      Electronically signed by:  Joshua Carmichael MD 5/25/2022 12:15 AM

## 2022-06-08 ENCOUNTER — APPOINTMENT (OUTPATIENT)
Dept: GENERAL RADIOLOGY | Age: 67
DRG: 299 | End: 2022-06-08
Attending: EMERGENCY MEDICINE
Payer: MEDICARE

## 2022-06-08 ENCOUNTER — HOSPITAL ENCOUNTER (INPATIENT)
Age: 67
LOS: 5 days | Discharge: HOME HEALTH | DRG: 299 | End: 2022-06-13
Attending: EMERGENCY MEDICINE | Admitting: HOSPITALIST
Payer: MEDICARE

## 2022-06-08 DIAGNOSIS — J96.21 ACUTE ON CHRONIC RESPIRATORY FAILURE WITH HYPOXIA (HCC): Primary | ICD-10-CM

## 2022-06-08 DIAGNOSIS — E87.20 LACTIC ACID ACIDOSIS: ICD-10-CM

## 2022-06-08 PROBLEM — J96.90 RESPIRATORY FAILURE (HCC): Status: ACTIVE | Noted: 2022-06-08

## 2022-06-08 LAB
ALBUMIN SERPL-MCNC: 4.2 G/DL (ref 3.5–5)
ALBUMIN/GLOB SERPL: 1.4 {RATIO} (ref 1.1–2.2)
ALP SERPL-CCNC: 81 U/L (ref 45–117)
ALT SERPL-CCNC: 40 U/L (ref 12–78)
ANION GAP SERPL CALC-SCNC: 4 MMOL/L (ref 5–15)
ARTERIAL PATENCY WRIST A: POSITIVE
AST SERPL-CCNC: 24 U/L (ref 15–37)
ATRIAL RATE: 93 BPM
BASE EXCESS BLD CALC-SCNC: 4.7 MMOL/L
BASOPHILS # BLD: 0 K/UL (ref 0–0.1)
BASOPHILS NFR BLD: 0 % (ref 0–1)
BDY SITE: ABNORMAL
BILIRUB SERPL-MCNC: 1.1 MG/DL (ref 0.2–1)
BUN SERPL-MCNC: 13 MG/DL (ref 6–20)
BUN/CREAT SERPL: 16 (ref 12–20)
CALCIUM SERPL-MCNC: 9.9 MG/DL (ref 8.5–10.1)
CALCULATED P AXIS, ECG09: 74 DEGREES
CALCULATED R AXIS, ECG10: 68 DEGREES
CALCULATED T AXIS, ECG11: 65 DEGREES
CHLORIDE SERPL-SCNC: 98 MMOL/L (ref 97–108)
CO2 SERPL-SCNC: 32 MMOL/L (ref 21–32)
COMMENT, HOLDF: NORMAL
COVID-19 RAPID TEST, COVR: NOT DETECTED
CREAT SERPL-MCNC: 0.8 MG/DL (ref 0.55–1.02)
D DIMER PPP FEU-MCNC: 0.38 MG/L FEU (ref 0–0.65)
DIAGNOSIS, 93000: NORMAL
DIFFERENTIAL METHOD BLD: ABNORMAL
EOSINOPHIL # BLD: 0 K/UL (ref 0–0.4)
EOSINOPHIL NFR BLD: 0 % (ref 0–7)
ERYTHROCYTE [DISTWIDTH] IN BLOOD BY AUTOMATED COUNT: 17.9 % (ref 11.5–14.5)
GAS FLOW.O2 O2 DELIVERY SYS: ABNORMAL L/MIN
GLOBULIN SER CALC-MCNC: 2.9 G/DL (ref 2–4)
GLUCOSE BLD STRIP.AUTO-MCNC: 149 MG/DL (ref 65–117)
GLUCOSE BLD STRIP.AUTO-MCNC: 194 MG/DL (ref 65–117)
GLUCOSE SERPL-MCNC: 187 MG/DL (ref 65–100)
HCO3 BLD-SCNC: 29.3 MMOL/L (ref 22–26)
HCT VFR BLD AUTO: 39.5 % (ref 35–47)
HGB BLD-MCNC: 12.6 G/DL (ref 11.5–16)
IMM GRANULOCYTES # BLD AUTO: 0 K/UL
IMM GRANULOCYTES NFR BLD AUTO: 0 %
LACTATE SERPL-SCNC: 2.8 MMOL/L (ref 0.4–2)
LYMPHOCYTES # BLD: 1.1 K/UL (ref 0.8–3.5)
LYMPHOCYTES NFR BLD: 6 % (ref 12–49)
MAGNESIUM SERPL-MCNC: 2.5 MG/DL (ref 1.6–2.4)
MCH RBC QN AUTO: 32.7 PG (ref 26–34)
MCHC RBC AUTO-ENTMCNC: 31.9 G/DL (ref 30–36.5)
MCV RBC AUTO: 102.6 FL (ref 80–99)
MONOCYTES # BLD: 0.5 K/UL (ref 0–1)
MONOCYTES NFR BLD: 3 % (ref 5–13)
MYELOCYTES NFR BLD MANUAL: 2 %
NEUTS SEG # BLD: 16.3 K/UL (ref 1.8–8)
NEUTS SEG NFR BLD: 89 % (ref 32–75)
NRBC # BLD: 0 K/UL (ref 0–0.01)
NRBC BLD-RTO: 0 PER 100 WBC
O2/TOTAL GAS SETTING VFR VENT: 15 %
P-R INTERVAL, ECG05: 110 MS
PCO2 BLD: 42.1 MMHG (ref 35–45)
PH BLD: 7.45 [PH] (ref 7.35–7.45)
PLATELET # BLD AUTO: 329 K/UL (ref 150–400)
PMV BLD AUTO: 9.6 FL (ref 8.9–12.9)
PO2 BLD: 291 MMHG (ref 80–100)
POTASSIUM SERPL-SCNC: 3.8 MMOL/L (ref 3.5–5.1)
PROT SERPL-MCNC: 7.1 G/DL (ref 6.4–8.2)
Q-T INTERVAL, ECG07: 338 MS
QRS DURATION, ECG06: 74 MS
QTC CALCULATION (BEZET), ECG08: 420 MS
RBC # BLD AUTO: 3.85 M/UL (ref 3.8–5.2)
RBC MORPH BLD: ABNORMAL
RBC MORPH BLD: ABNORMAL
SAMPLES BEING HELD,HOLD: NORMAL
SAO2 % BLD: 99.9 % (ref 92–97)
SERVICE CMNT-IMP: ABNORMAL
SERVICE CMNT-IMP: ABNORMAL
SODIUM SERPL-SCNC: 134 MMOL/L (ref 136–145)
SOURCE, COVRS: NOT DETECTED
SPECIMEN TYPE: ABNORMAL
TROPONIN-HIGH SENSITIVITY: 7 NG/L (ref 0–51)
VENTRICULAR RATE, ECG03: 93 BPM
WBC # BLD AUTO: 18.3 K/UL (ref 3.6–11)

## 2022-06-08 PROCEDURE — 36415 COLL VENOUS BLD VENIPUNCTURE: CPT

## 2022-06-08 PROCEDURE — 74011000250 HC RX REV CODE- 250: Performed by: EMERGENCY MEDICINE

## 2022-06-08 PROCEDURE — 83605 ASSAY OF LACTIC ACID: CPT

## 2022-06-08 PROCEDURE — 82803 BLOOD GASES ANY COMBINATION: CPT

## 2022-06-08 PROCEDURE — 96365 THER/PROPH/DIAG IV INF INIT: CPT

## 2022-06-08 PROCEDURE — 96375 TX/PRO/DX INJ NEW DRUG ADDON: CPT

## 2022-06-08 PROCEDURE — 74011250637 HC RX REV CODE- 250/637: Performed by: HOSPITALIST

## 2022-06-08 PROCEDURE — 71045 X-RAY EXAM CHEST 1 VIEW: CPT

## 2022-06-08 PROCEDURE — 74011250636 HC RX REV CODE- 250/636: Performed by: HOSPITALIST

## 2022-06-08 PROCEDURE — 87040 BLOOD CULTURE FOR BACTERIA: CPT

## 2022-06-08 PROCEDURE — 80053 COMPREHEN METABOLIC PANEL: CPT

## 2022-06-08 PROCEDURE — 65660000001 HC RM ICU INTERMED STEPDOWN

## 2022-06-08 PROCEDURE — 82962 GLUCOSE BLOOD TEST: CPT

## 2022-06-08 PROCEDURE — 93005 ELECTROCARDIOGRAM TRACING: CPT

## 2022-06-08 PROCEDURE — 74011000250 HC RX REV CODE- 250: Performed by: HOSPITALIST

## 2022-06-08 PROCEDURE — 99285 EMERGENCY DEPT VISIT HI MDM: CPT

## 2022-06-08 PROCEDURE — 83735 ASSAY OF MAGNESIUM: CPT

## 2022-06-08 PROCEDURE — 84484 ASSAY OF TROPONIN QUANT: CPT

## 2022-06-08 PROCEDURE — 36600 WITHDRAWAL OF ARTERIAL BLOOD: CPT

## 2022-06-08 PROCEDURE — 94640 AIRWAY INHALATION TREATMENT: CPT

## 2022-06-08 PROCEDURE — 74011250636 HC RX REV CODE- 250/636: Performed by: EMERGENCY MEDICINE

## 2022-06-08 PROCEDURE — 74011250637 HC RX REV CODE- 250/637: Performed by: NURSE PRACTITIONER

## 2022-06-08 PROCEDURE — 85379 FIBRIN DEGRADATION QUANT: CPT

## 2022-06-08 PROCEDURE — 87635 SARS-COV-2 COVID-19 AMP PRB: CPT

## 2022-06-08 PROCEDURE — 85025 COMPLETE CBC W/AUTO DIFF WBC: CPT

## 2022-06-08 RX ORDER — ASPIRIN 81 MG/1
81 TABLET ORAL DAILY
Status: DISCONTINUED | OUTPATIENT
Start: 2022-06-09 | End: 2022-06-13 | Stop reason: HOSPADM

## 2022-06-08 RX ORDER — CLINDAMYCIN PHOSPHATE 600 MG/50ML
600 INJECTION INTRAVENOUS
Status: COMPLETED | OUTPATIENT
Start: 2022-06-08 | End: 2022-06-08

## 2022-06-08 RX ORDER — SODIUM CHLORIDE 0.9 % (FLUSH) 0.9 %
5-40 SYRINGE (ML) INJECTION AS NEEDED
Status: DISCONTINUED | OUTPATIENT
Start: 2022-06-08 | End: 2022-06-13 | Stop reason: HOSPADM

## 2022-06-08 RX ORDER — IPRATROPIUM BROMIDE AND ALBUTEROL SULFATE 2.5; .5 MG/3ML; MG/3ML
3 SOLUTION RESPIRATORY (INHALATION)
Status: DISCONTINUED | OUTPATIENT
Start: 2022-06-08 | End: 2022-06-10

## 2022-06-08 RX ORDER — MAGNESIUM SULFATE 100 %
4 CRYSTALS MISCELLANEOUS AS NEEDED
Status: DISCONTINUED | OUTPATIENT
Start: 2022-06-08 | End: 2022-06-13 | Stop reason: HOSPADM

## 2022-06-08 RX ORDER — ONDANSETRON 2 MG/ML
4 INJECTION INTRAMUSCULAR; INTRAVENOUS
Status: DISCONTINUED | OUTPATIENT
Start: 2022-06-08 | End: 2022-06-13 | Stop reason: HOSPADM

## 2022-06-08 RX ORDER — SODIUM CHLORIDE 0.9 % (FLUSH) 0.9 %
5-40 SYRINGE (ML) INJECTION EVERY 8 HOURS
Status: DISCONTINUED | OUTPATIENT
Start: 2022-06-08 | End: 2022-06-13 | Stop reason: HOSPADM

## 2022-06-08 RX ORDER — METHOTREXATE 2.5 MG/1
20 TABLET ORAL
Status: DISCONTINUED | OUTPATIENT
Start: 2022-06-10 | End: 2022-06-13 | Stop reason: HOSPADM

## 2022-06-08 RX ORDER — FAMOTIDINE 20 MG/1
20 TABLET, FILM COATED ORAL 2 TIMES DAILY
Status: COMPLETED | OUTPATIENT
Start: 2022-06-08 | End: 2022-06-11

## 2022-06-08 RX ORDER — LEVOTHYROXINE SODIUM 100 UG/1
100 TABLET ORAL
Status: DISCONTINUED | OUTPATIENT
Start: 2022-06-09 | End: 2022-06-13 | Stop reason: HOSPADM

## 2022-06-08 RX ORDER — ACETAMINOPHEN 325 MG/1
650 TABLET ORAL
Status: DISCONTINUED | OUTPATIENT
Start: 2022-06-08 | End: 2022-06-13 | Stop reason: HOSPADM

## 2022-06-08 RX ORDER — ACETAMINOPHEN 650 MG/1
650 SUPPOSITORY RECTAL
Status: DISCONTINUED | OUTPATIENT
Start: 2022-06-08 | End: 2022-06-13 | Stop reason: HOSPADM

## 2022-06-08 RX ORDER — INSULIN LISPRO 100 [IU]/ML
INJECTION, SOLUTION INTRAVENOUS; SUBCUTANEOUS
Status: DISCONTINUED | OUTPATIENT
Start: 2022-06-08 | End: 2022-06-13 | Stop reason: HOSPADM

## 2022-06-08 RX ORDER — DAPSONE 100 MG/1
100 TABLET ORAL DAILY
Status: DISCONTINUED | OUTPATIENT
Start: 2022-06-09 | End: 2022-06-13 | Stop reason: HOSPADM

## 2022-06-08 RX ORDER — ENOXAPARIN SODIUM 100 MG/ML
40 INJECTION SUBCUTANEOUS DAILY
Status: DISCONTINUED | OUTPATIENT
Start: 2022-06-09 | End: 2022-06-13 | Stop reason: HOSPADM

## 2022-06-08 RX ORDER — POLYETHYLENE GLYCOL 3350 17 G/17G
17 POWDER, FOR SOLUTION ORAL DAILY PRN
Status: DISCONTINUED | OUTPATIENT
Start: 2022-06-08 | End: 2022-06-13 | Stop reason: HOSPADM

## 2022-06-08 RX ORDER — ONDANSETRON 4 MG/1
4 TABLET, ORALLY DISINTEGRATING ORAL
Status: DISCONTINUED | OUTPATIENT
Start: 2022-06-08 | End: 2022-06-13 | Stop reason: HOSPADM

## 2022-06-08 RX ORDER — DEXTROMETHORPHAN POLISTIREX 30 MG/5ML
30 SUSPENSION ORAL
Status: DISCONTINUED | OUTPATIENT
Start: 2022-06-08 | End: 2022-06-13 | Stop reason: HOSPADM

## 2022-06-08 RX ORDER — AMLODIPINE BESYLATE 5 MG/1
5 TABLET ORAL
Status: DISCONTINUED | OUTPATIENT
Start: 2022-06-08 | End: 2022-06-13 | Stop reason: HOSPADM

## 2022-06-08 RX ORDER — IPRATROPIUM BROMIDE AND ALBUTEROL SULFATE 2.5; .5 MG/3ML; MG/3ML
3 SOLUTION RESPIRATORY (INHALATION)
Status: COMPLETED | OUTPATIENT
Start: 2022-06-08 | End: 2022-06-08

## 2022-06-08 RX ORDER — FUROSEMIDE 40 MG/1
40 TABLET ORAL
Status: DISCONTINUED | OUTPATIENT
Start: 2022-06-08 | End: 2022-06-13 | Stop reason: HOSPADM

## 2022-06-08 RX ORDER — LEVOFLOXACIN 5 MG/ML
750 INJECTION, SOLUTION INTRAVENOUS EVERY 24 HOURS
Status: COMPLETED | OUTPATIENT
Start: 2022-06-08 | End: 2022-06-12

## 2022-06-08 RX ORDER — BUDESONIDE 0.5 MG/2ML
500 INHALANT ORAL
Status: DISCONTINUED | OUTPATIENT
Start: 2022-06-08 | End: 2022-06-13 | Stop reason: HOSPADM

## 2022-06-08 RX ORDER — SPIRONOLACTONE 25 MG/1
25 TABLET ORAL DAILY
Status: DISCONTINUED | OUTPATIENT
Start: 2022-06-09 | End: 2022-06-13 | Stop reason: HOSPADM

## 2022-06-08 RX ORDER — DEXAMETHASONE SODIUM PHOSPHATE 10 MG/ML
10 INJECTION INTRAMUSCULAR; INTRAVENOUS ONCE
Status: COMPLETED | OUTPATIENT
Start: 2022-06-08 | End: 2022-06-08

## 2022-06-08 RX ADMIN — LEVOFLOXACIN 750 MG: 5 INJECTION, SOLUTION INTRAVENOUS at 17:53

## 2022-06-08 RX ADMIN — IPRATROPIUM BROMIDE AND ALBUTEROL SULFATE 3 ML: .5; 3 SOLUTION RESPIRATORY (INHALATION) at 14:46

## 2022-06-08 RX ADMIN — CLINDAMYCIN PHOSPHATE 600 MG: 600 INJECTION, SOLUTION INTRAVENOUS at 15:08

## 2022-06-08 RX ADMIN — SODIUM CHLORIDE 1000 ML: 9 INJECTION, SOLUTION INTRAVENOUS at 15:08

## 2022-06-08 RX ADMIN — DEXAMETHASONE SODIUM PHOSPHATE 10 MG: 10 INJECTION, SOLUTION INTRAMUSCULAR; INTRAVENOUS at 14:10

## 2022-06-08 RX ADMIN — ACETAMINOPHEN 650 MG: 325 TABLET ORAL at 19:25

## 2022-06-08 RX ADMIN — FAMOTIDINE 20 MG: 20 TABLET ORAL at 17:53

## 2022-06-08 RX ADMIN — AMLODIPINE BESYLATE 5 MG: 5 TABLET ORAL at 21:45

## 2022-06-08 RX ADMIN — Medication 30 MG: at 22:44

## 2022-06-08 RX ADMIN — BUDESONIDE 500 MCG: 0.5 INHALANT RESPIRATORY (INHALATION) at 20:39

## 2022-06-08 RX ADMIN — METHYLPREDNISOLONE SODIUM SUCCINATE 60 MG: 125 INJECTION, POWDER, FOR SOLUTION INTRAMUSCULAR; INTRAVENOUS at 21:45

## 2022-06-08 RX ADMIN — IPRATROPIUM BROMIDE AND ALBUTEROL SULFATE 3 ML: .5; 3 SOLUTION RESPIRATORY (INHALATION) at 19:10
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
## 2022-06-08 NOTE — ED TRIAGE NOTES
Pt arrives via EMS c/o increased SOB today. Pt was seen at [address] [place] [address] in March and diagnosed with GPA vasculitis. She has since been on 2-3L continuous oxygen at home via [facility] but had oxygen saturations in the 80s today. Pt has labored but even breathing and has SpO2 of 93% on 15L NRB. A&O x4, clear speech, follows commands.

## 2022-06-09 LAB
ANION GAP SERPL CALC-SCNC: 8 MMOL/L (ref 5–15)
BASOPHILS # BLD: 0 K/UL (ref 0–0.1)
BASOPHILS NFR BLD: 0 % (ref 0–1)
BUN SERPL-MCNC: 10 MG/DL (ref 6–20)
BUN/CREAT SERPL: 20 (ref 12–20)
CALCIUM SERPL-MCNC: 8.8 MG/DL (ref 8.5–10.1)
CHLORIDE SERPL-SCNC: 102 MMOL/L (ref 97–108)
CO2 SERPL-SCNC: 26 MMOL/L (ref 21–32)
CREAT SERPL-MCNC: 0.49 MG/DL (ref 0.55–1.02)
DIFFERENTIAL METHOD BLD: ABNORMAL
EOSINOPHIL # BLD: 0 K/UL (ref 0–0.4)
EOSINOPHIL NFR BLD: 0 % (ref 0–7)
ERYTHROCYTE [DISTWIDTH] IN BLOOD BY AUTOMATED COUNT: 17.5 % (ref 11.5–14.5)
GLUCOSE BLD STRIP.AUTO-MCNC: 129 MG/DL (ref 65–117)
GLUCOSE BLD STRIP.AUTO-MCNC: 174 MG/DL (ref 65–117)
GLUCOSE BLD STRIP.AUTO-MCNC: 178 MG/DL (ref 65–117)
GLUCOSE BLD STRIP.AUTO-MCNC: 181 MG/DL (ref 65–117)
GLUCOSE SERPL-MCNC: 168 MG/DL (ref 65–100)
HCT VFR BLD AUTO: 34.7 % (ref 35–47)
HGB BLD-MCNC: 11.2 G/DL (ref 11.5–16)
IMM GRANULOCYTES # BLD AUTO: 0 K/UL
IMM GRANULOCYTES NFR BLD AUTO: 0 %
LYMPHOCYTES # BLD: 0.4 K/UL (ref 0.8–3.5)
LYMPHOCYTES NFR BLD: 3 % (ref 12–49)
MCH RBC QN AUTO: 33.7 PG (ref 26–34)
MCHC RBC AUTO-ENTMCNC: 32.3 G/DL (ref 30–36.5)
MCV RBC AUTO: 104.5 FL (ref 80–99)
MONOCYTES # BLD: 0.3 K/UL (ref 0–1)
MONOCYTES NFR BLD: 2 % (ref 5–13)
NEUTS BAND NFR BLD MANUAL: 1 % (ref 0–6)
NEUTS SEG # BLD: 13 K/UL (ref 1.8–8)
NEUTS SEG NFR BLD: 94 % (ref 32–75)
NRBC # BLD: 0 K/UL (ref 0–0.01)
NRBC BLD-RTO: 0 PER 100 WBC
PLATELET # BLD AUTO: 250 K/UL (ref 150–400)
PMV BLD AUTO: 10.4 FL (ref 8.9–12.9)
POTASSIUM SERPL-SCNC: 3.6 MMOL/L (ref 3.5–5.1)
RBC # BLD AUTO: 3.32 M/UL (ref 3.8–5.2)
RBC MORPH BLD: ABNORMAL
SERVICE CMNT-IMP: ABNORMAL
SODIUM SERPL-SCNC: 136 MMOL/L (ref 136–145)
WBC # BLD AUTO: 13.7 K/UL (ref 3.6–11)

## 2022-06-09 PROCEDURE — 74011636637 HC RX REV CODE- 636/637: Performed by: HOSPITALIST

## 2022-06-09 PROCEDURE — 65660000001 HC RM ICU INTERMED STEPDOWN

## 2022-06-09 PROCEDURE — 74011250637 HC RX REV CODE- 250/637: Performed by: HOSPITALIST

## 2022-06-09 PROCEDURE — 74011250637 HC RX REV CODE- 250/637: Performed by: NURSE PRACTITIONER

## 2022-06-09 PROCEDURE — 82962 GLUCOSE BLOOD TEST: CPT

## 2022-06-09 PROCEDURE — 80048 BASIC METABOLIC PNL TOTAL CA: CPT

## 2022-06-09 PROCEDURE — 85025 COMPLETE CBC W/AUTO DIFF WBC: CPT

## 2022-06-09 PROCEDURE — 94664 DEMO&/EVAL PT USE INHALER: CPT

## 2022-06-09 PROCEDURE — 77010033678 HC OXYGEN DAILY

## 2022-06-09 PROCEDURE — 74011000250 HC RX REV CODE- 250: Performed by: HOSPITALIST

## 2022-06-09 PROCEDURE — 36415 COLL VENOUS BLD VENIPUNCTURE: CPT

## 2022-06-09 PROCEDURE — 94640 AIRWAY INHALATION TREATMENT: CPT

## 2022-06-09 PROCEDURE — 74011250636 HC RX REV CODE- 250/636: Performed by: HOSPITALIST

## 2022-06-09 RX ORDER — DOCUSATE SODIUM 100 MG/1
100 CAPSULE, LIQUID FILLED ORAL DAILY
Status: DISCONTINUED | OUTPATIENT
Start: 2022-06-10 | End: 2022-06-13 | Stop reason: HOSPADM

## 2022-06-09 RX ORDER — ZOLPIDEM TARTRATE 5 MG/1
5 TABLET ORAL
Status: DISCONTINUED | OUTPATIENT
Start: 2022-06-09 | End: 2022-06-13 | Stop reason: HOSPADM

## 2022-06-09 RX ADMIN — Medication 2 UNITS: at 11:58

## 2022-06-09 RX ADMIN — ASPIRIN 81 MG: 81 TABLET, COATED ORAL at 09:28

## 2022-06-09 RX ADMIN — ZOLPIDEM TARTRATE 5 MG: 5 TABLET ORAL at 21:16

## 2022-06-09 RX ADMIN — SODIUM CHLORIDE, PRESERVATIVE FREE 10 ML: 5 INJECTION INTRAVENOUS at 07:11

## 2022-06-09 RX ADMIN — METHYLPREDNISOLONE SODIUM SUCCINATE 60 MG: 125 INJECTION, POWDER, FOR SOLUTION INTRAMUSCULAR; INTRAVENOUS at 21:13

## 2022-06-09 RX ADMIN — FAMOTIDINE 20 MG: 20 TABLET ORAL at 06:57

## 2022-06-09 RX ADMIN — SPIRONOLACTONE 25 MG: 25 TABLET ORAL at 09:28

## 2022-06-09 RX ADMIN — IPRATROPIUM BROMIDE AND ALBUTEROL SULFATE 3 ML: .5; 3 SOLUTION RESPIRATORY (INHALATION) at 04:19

## 2022-06-09 RX ADMIN — FAMOTIDINE 20 MG: 20 TABLET ORAL at 18:45

## 2022-06-09 RX ADMIN — LEVOFLOXACIN 750 MG: 5 INJECTION, SOLUTION INTRAVENOUS at 16:43

## 2022-06-09 RX ADMIN — BUDESONIDE 500 MCG: 0.5 INHALANT RESPIRATORY (INHALATION) at 22:24

## 2022-06-09 RX ADMIN — Medication 2 UNITS: at 16:42

## 2022-06-09 RX ADMIN — SODIUM CHLORIDE, PRESERVATIVE FREE 10 ML: 5 INJECTION INTRAVENOUS at 16:44

## 2022-06-09 RX ADMIN — SODIUM CHLORIDE, PRESERVATIVE FREE 10 ML: 5 INJECTION INTRAVENOUS at 21:13

## 2022-06-09 RX ADMIN — METHYLPREDNISOLONE SODIUM SUCCINATE 60 MG: 125 INJECTION, POWDER, FOR SOLUTION INTRAMUSCULAR; INTRAVENOUS at 09:29

## 2022-06-09 RX ADMIN — IPRATROPIUM BROMIDE AND ALBUTEROL SULFATE 3 ML: .5; 3 SOLUTION RESPIRATORY (INHALATION) at 09:17

## 2022-06-09 RX ADMIN — AMLODIPINE BESYLATE 5 MG: 5 TABLET ORAL at 21:13

## 2022-06-09 RX ADMIN — LEVOTHYROXINE SODIUM 100 MCG: 0.1 TABLET ORAL at 06:57

## 2022-06-09 RX ADMIN — ENOXAPARIN SODIUM 40 MG: 100 INJECTION SUBCUTANEOUS at 09:28

## 2022-06-09 RX ADMIN — BUDESONIDE 500 MCG: 0.5 INHALANT RESPIRATORY (INHALATION) at 09:17

## 2022-06-09 RX ADMIN — IPRATROPIUM BROMIDE AND ALBUTEROL SULFATE 3 ML: .5; 3 SOLUTION RESPIRATORY (INHALATION) at 14:10

## 2022-06-09 RX ADMIN — IPRATROPIUM BROMIDE AND ALBUTEROL SULFATE 3 ML: .5; 3 SOLUTION RESPIRATORY (INHALATION) at 22:24

## 2022-06-09 RX ADMIN — Medication 2 UNITS: at 07:08

## 2022-06-09 RX ADMIN — DAPSONE 100 MG: 100 TABLET ORAL at 09:28

## 2022-06-09 RX ADMIN — Medication 30 MG: at 20:25

## 2022-06-09 NOTE — PROGRESS NOTES
0800: Bedside shift change report given to Galina Jenkins (oncoming nurse) by Nima Garcia (offgoing nurse). Report included the following information SBAR, Kardex, Accordion and Cardiac Rhythm NSR.

## 2022-06-09 NOTE — PROGRESS NOTES
Readmission Assessment  Number of days since last admission?: 8-30 days  Previous disposition: Home with Family  Who is being interviewed?: Patient  What was the patient's/caregiver's perception as to why they think they needed to return back to the hospital?: Other (Comment)  Did you visit your Primary Care Physician after you left the hospital, before you returned this time?: Yes (5/26/22)  Did you see a specialist, such as Cardiac, Pulmonary, Orthopedic Physician, etc. after you left the hospital?: No  Who advised the patient to return to the hospital?: Self-referral  Does the patient report anything that got in the way of taking their medications?: No  In our efforts to provide the best possible care to you and others like you, can you think of anything that we could have done to help you after you left the hospital the first time, so that you might not have needed to return so soon?: Other (Comment)       Care Management Interventions  PCP Verified by CM: Yes (5/26/22)  Mode of Transport at Discharge: Other (see comment)  Transition of Care Consult (CM Consult): Discharge Planning  Discharge Durable Medical Equipment: No  Physical Therapy Consult: No  Occupational Therapy Consult: No  Speech Therapy Consult: No  Support Systems: Spouse/Significant Other  Confirm Follow Up Transport: Family  Discharge Location  Patient Expects to be Discharged to[de-identified] Home       Reason for Admission:   SOB                    RUR Score:   15%               PCP: First and Last name:   Paolo Linda MD     Name of Practice: [facility]   Are you a current patient: Yes/No: yes   Approximate date of last visit: 5/26/22   Can you participate in a virtual visit if needed: yes    Do you (patient/family) have any concerns for transition/discharge?      no              Plan for utilizing home health:   Not indicated    Current Advanced Directive/Advance Care Plan:  Full Code      Healthcare Decision Maker:   Click here to complete HealthCare Decision Makers including selection of the Healthcare Decision Maker Relationship (ie \"Primary\")              Transition of Care Plan:    Home once medically stable    Chart reviewed. Patient re-admitted here 6/8/22 from her home. Adm dx: Pulmonary HTNThe patient has complaints of SOB. The patient has a past medical hx of Aortic Valve disease, HTN, GERD, Meniere's disease, Hyperlipidemia, Rheumatoid Arthritis and Hypothyroidism. The patient has a prior hospitalization at [facility] (March 2022) and was seen for  GPA Vasculitis and [place] Palsy. CM met with patient to introduce self and role. The patient is  and lives with her spouse in a 1-story residence. She is a retired . .  Demographics verified. The pairnt had a previous hospitalization  5/20/22 to 5/23/22 and was d/c'd home with Home O2 via [facility]. No DME. The patient is alert and oriented x4. Patient's spouse to assist her with d/c transportation when she is ready for d/c. CM will continue to follow. Brandy Camilo, [address], [address]      Reason for Readmission:  SOB          RUR Score/Risk Level:     15%    PCP: First and Last name:  Cassie Corona   Name of Practice: [facility]   Are you a current patient: Yes/No: yes   Approximate date of last visit: 5/26/22   Can you participate in a virtual visit with your PCP: yes    Is a Care Conference indicated:   no      Did you attend your follow up appointment (s): If not, why not: yes         Resources/supports as identified by patient/family:          Top Challenges facing patient (as identified by patient/family and CM): Finances/Medication cost?       Transportation        Support system or lack thereof? Living arrangements? Self-care/ADLs/Cognition?             Current Advanced Directive/Advance Care Plan:             Plan for utilizing home health:   Not indicated             Transition of Care Plan:    Based on readmission, the patient's previous Plan of Care   has been evaluated and/or modified. The current Transition of Care Plan is:      Home once medically stable       Medicare pt has received, reviewed, and signed 2nd IM letter informing them of their right to appeal the discharge. Signed copied has been placed on pt bedside chart. CM continuing to follow.     Vania Matt, [address], [address]

## 2022-06-09 NOTE — PROGRESS NOTES
Bedside and Verbal shift change report given to [facility] (oncoming nurse) by Darcie Rivero (offgoing nurse). Report included the following information SBAR, Kardex, Intake/Output, MAR, Recent Results and Cardiac Rhythm NSR.

## 2022-06-09 NOTE — PROGRESS NOTES
[address] [facility] Adult  Hospitalist Group                                                                                          Hospitalist Progress Note  Marj Beckwith MD  Answering service: [phone] from in house phone        Date of Service:  2022  NAME:  Kade Harrell  :  1955  MRN:  [id number]      Admission Summary:   Kade Harrell is a 78 y.o. female who presents with  Sob. Patient was recently admitted and discharged 2 weeks back from [facility].  Patient was discharged on home oxygen was on 2 L at home and taking medications regularly this morning patient said that she suddenly become so short of breath she was unable to breathe and decided to come to the hospital.  Patient was desaturating to 80s and put back on 6 L at in the ED. patient has past medical history of GPA vasculitis rheumatoid arthritis and managed by Dr. Renee Esteves at [facility]. Patient also history of Bell's palsy aortic valve insufficiency hypertension rheumatoid arthritis patient chronically on steroids.   Patient has been vaccinated for COVID-19 denies any fever cough shortness of breath or chest pain admitted for further management    Interval history / Subjective:     Patient seen and examined she feels little better but she is still on nasal cannula oxygen 4 L baseline is 2 L at home     Assessment & Plan:     Acute on chronic hypoxic respiratory failure due to systemic vasculitis possibly involving lung as well  -- Start steroids IV 60 every 12--continue today  -- Nebs every 12 Brovana Pulmicort  -- Start antibiotics Levaquin  -- Patient had recent [facility] admission for the same follows at [facility] for GPA vasculitis  --Consult pulmonary     Systemic vasculitis - managed by her rheumatologist  Patient is on methotrexate and Prolia injection  Chronically on steroids  On Rituxan every 6 month     Hypertension continue home medication  Leukocytosis with neurophilia-trending down with antibiotics  hyperglycemia from steroids- monitor        Code status: Full code  DVT prophylaxis: Lovenox    Care Plan discussed with: Patient/Family and Nurse  Anticipated Disposition: Home w/Family  Anticipated Discharge: 24 hours to 48 hours     Hospital Problems  Date Reviewed: 9/28/2021          Codes Class Noted POA    * (Principal) Respiratory failure ([address] ICD-10-CM: J96.90  ICD-9-CM: 518.81  6/8/2022 Unknown                Review of Systems:   A comprehensive review of systems was negative. Vital Signs:    Last 24hrs VS reviewed since prior progress note.  Most recent are:  Visit Vitals  BP (!) 143/66 (BP 1 Location: Left upper arm, BP Patient Position: At rest)   Pulse (!) 101   Temp 98.7 °F (37.1 °C)   Resp 17   Wt 59.3 kg (130 lb 11.7 oz)   SpO2 94%   BMI 21.10 kg/m²         Intake/Output Summary (Last 24 hours) at 6/9/2022 1325  Last data filed at 6/9/2022 0000  Gross per 24 hour   Intake 150 ml   Output --   Net 150 ml        Physical Examination:     I had a face to face encounter with this patient and independently examined them on 6/9/2022 as outlined below:          General : alert x 3, awake, no acute distress, resting in bed, pleasant   HEENT: PEERL, EOMI, moist mucus membrane, TM clear  Neck: supple, no JVD, no meningeal signs  Chest: Clear to auscultation bilaterally good air entry today  CVS: S1 S2 heard, Capillary refill less than 2 seconds  Abd: soft/ Non tender, non distended, BS physiological,   Ext: no clubbing, no cyanosis, no edema, brisk 2+ DP pulses  Neuro/Psych: pleasant mood and affect, CN 2-12 grossly intact, sensory grossly within normal limit, Strength 5/5 in all extremities, DTR 1+ x 4  Skin: warm     Data Review:    I personally reviewed  Image and labs      Labs:     Recent Labs     06/09/22  0434 06/08/22  1350   WBC 13.7* 18.3*   HGB 11.2* 12.6   HCT 34.7* 39.5    329     Recent Labs     06/09/22  0434 06/08/22  1350    134*   K 3.6 3.8    98   CO2 26 32   BUN 10 13   CREA 0.49* 0.80   * 187*   CA 8.8 9.9   MG  --  2.5*     Recent Labs     06/08/22  1350   ALT 40   AP 81   TBILI 1.1*   TP 7.1   ALB 4.2   GLOB 2.9     No results for input(s): INR, PTP, APTT, INREXT in the last 72 hours. No results for input(s): FE, TIBC, PSAT, FERR in the last 72 hours. No results found for: FOL, RBCF   No results for input(s): PH, PCO2, PO2 in the last 72 hours. No results for input(s): CPK, CKNDX, TROIQ in the last 72 hours.     No lab exists for component: CPKMB  Lab Results   Component Value Date/Time    Cholesterol, total 165 04/15/2016 08:45 AM    HDL Cholesterol 70 04/15/2016 08:45 AM    LDL, calculated 78 04/15/2016 08:45 AM    Triglyceride 86 04/15/2016 08:45 AM     Lab Results   Component Value Date/Time    Glucose (POC) 178 (H) 06/09/2022 11:23 AM    Glucose (POC) 174 (H) 06/09/2022 07:04 AM    Glucose (POC) 194 (H) 06/08/2022 09:28 PM    Glucose (POC) 149 (H) 06/08/2022 05:01 PM     Lab Results   Component Value Date/Time    Color YELLOW/STRAW 05/14/2021 09:52 AM    Appearance CLEAR 05/14/2021 09:52 AM    Specific gravity 1.017 05/14/2021 09:52 AM    pH (UA) 7.0 05/14/2021 09:52 AM    Protein TRACE (A) 05/14/2021 09:52 AM    Glucose 100 (A) 05/14/2021 09:52 AM    Ketone Negative 05/14/2021 09:52 AM    Bilirubin Negative 05/14/2021 09:52 AM    Urobilinogen 0.2 05/14/2021 09:52 AM    Nitrites Negative 05/14/2021 09:52 AM    Leukocyte Esterase Negative 05/14/2021 09:52 AM    Epithelial cells FEW 05/14/2021 09:52 AM    Bacteria Negative 05/14/2021 09:52 AM    WBC 0-4 05/14/2021 09:52 AM    RBC 0-5 05/14/2021 09:52 AM         Medications Reviewed:     Current Facility-Administered Medications   Medication Dose Route Frequency    zolpidem (AMBIEN) tablet 5 mg  5 mg Oral QHS PRN    sodium chloride (NS) flush 5-40 mL  5-40 mL IntraVENous Q8H    sodium chloride (NS) flush 5-40 mL  5-40 mL IntraVENous PRN    acetaminophen (TYLENOL) tablet 650 mg  650 mg Oral Q6H PRN    Or    acetaminophen (TYLENOL) suppository 650 mg  650 mg Rectal Q6H PRN    polyethylene glycol (MIRALAX) packet 17 g  17 g Oral DAILY PRN    ondansetron (ZOFRAN ODT) tablet 4 mg  4 mg Oral Q8H PRN    Or    ondansetron (ZOFRAN) injection 4 mg  4 mg IntraVENous Q6H PRN    enoxaparin (LOVENOX) injection 40 mg  40 mg SubCUTAneous DAILY    amLODIPine (NORVASC) tablet 5 mg  5 mg Oral QHS    aspirin delayed-release tablet 81 mg  81 mg Oral DAILY    dapsone tablet 100 mg  100 mg Oral DAILY    famotidine (PEPCID) tablet 20 mg  20 mg Oral BID    furosemide (LASIX) tablet 40 mg  40 mg Oral Q MON, WED & FRI    levothyroxine (SYNTHROID) tablet 100 mcg  100 mcg Oral ACB    [START ON 6/10/2022] methotrexate (RHEUMATREX) tablet 20 mg  20 mg Oral every Friday    methylPREDNISolone (PF) (Solu-MEDROL) injection 60 mg  60 mg IntraVENous Q12H    insulin lispro (HUMALOG) injection   SubCUTAneous AC&HS    glucose chewable tablet 16 g  4 Tablet Oral PRN    glucagon (GLUCAGEN) injection 1 mg  1 mg IntraMUSCular PRN    dextrose 10 % infusion 0-250 mL  0-250 mL IntraVENous PRN    levoFLOXacin (LEVAQUIN) 750 mg in D5W IVPB  750 mg IntraVENous Q24H    albuterol-ipratropium (DUO-NEB) 2.5 MG-0.5 MG/3 ML  3 mL Nebulization Q6H RT    budesonide (PULMICORT) 500 mcg/2 ml nebulizer suspension  500 mcg Nebulization BID RT    spironolactone (ALDACTONE) tablet 25 mg  25 mg Oral DAILY    dextromethorphan (DELSYM) 30 mg/5 mL syrup 30 mg  30 mg Oral Q12H PRN     ______________________________________________________________________  EXPECTED LENGTH OF STAY: 3d 21h  ACTUAL LENGTH OF STAY:          1      Please note that this dictation was completed with [employer], the [employer] voice recognition software. Quite often unanticipated grammatical, syntax, homophones, and other interpretive errors are inadvertently transcribed by the computer software. Please disregard these errors.   Please excuse any errors that have escaped final proofreading.                 Jaren Bustillos MD

## 2022-06-10 ENCOUNTER — APPOINTMENT (OUTPATIENT)
Dept: CT IMAGING | Age: 67
DRG: 299 | End: 2022-06-10
Attending: NURSE PRACTITIONER
Payer: MEDICARE

## 2022-06-10 ENCOUNTER — APPOINTMENT (OUTPATIENT)
Dept: NON INVASIVE DIAGNOSTICS | Age: 67
DRG: 299 | End: 2022-06-10
Attending: NURSE PRACTITIONER
Payer: MEDICARE

## 2022-06-10 LAB
ANION GAP SERPL CALC-SCNC: 3 MMOL/L (ref 5–15)
BASOPHILS # BLD: 0 K/UL (ref 0–0.1)
BASOPHILS NFR BLD: 0 % (ref 0–1)
BUN SERPL-MCNC: 15 MG/DL (ref 6–20)
BUN/CREAT SERPL: 26 (ref 12–20)
CALCIUM SERPL-MCNC: 8.6 MG/DL (ref 8.5–10.1)
CHLORIDE SERPL-SCNC: 103 MMOL/L (ref 97–108)
CO2 SERPL-SCNC: 30 MMOL/L (ref 21–32)
CREAT SERPL-MCNC: 0.58 MG/DL (ref 0.55–1.02)
DIFFERENTIAL METHOD BLD: ABNORMAL
EOSINOPHIL # BLD: 0 K/UL (ref 0–0.4)
EOSINOPHIL NFR BLD: 0 % (ref 0–7)
ERYTHROCYTE [DISTWIDTH] IN BLOOD BY AUTOMATED COUNT: 17.9 % (ref 11.5–14.5)
GLUCOSE BLD STRIP.AUTO-MCNC: 135 MG/DL (ref 65–117)
GLUCOSE BLD STRIP.AUTO-MCNC: 162 MG/DL (ref 65–117)
GLUCOSE BLD STRIP.AUTO-MCNC: 171 MG/DL (ref 65–117)
GLUCOSE BLD STRIP.AUTO-MCNC: 252 MG/DL (ref 65–117)
GLUCOSE SERPL-MCNC: 199 MG/DL (ref 65–100)
HCT VFR BLD AUTO: 33.8 % (ref 35–47)
HGB BLD-MCNC: 10.7 G/DL (ref 11.5–16)
IMM GRANULOCYTES # BLD AUTO: 0 K/UL
IMM GRANULOCYTES NFR BLD AUTO: 0 %
LYMPHOCYTES # BLD: 0.7 K/UL (ref 0.8–3.5)
LYMPHOCYTES NFR BLD: 5 % (ref 12–49)
MCH RBC QN AUTO: 33.2 PG (ref 26–34)
MCHC RBC AUTO-ENTMCNC: 31.7 G/DL (ref 30–36.5)
MCV RBC AUTO: 105 FL (ref 80–99)
METAMYELOCYTES NFR BLD MANUAL: 2 %
MONOCYTES # BLD: 0.5 K/UL (ref 0–1)
MONOCYTES NFR BLD: 4 % (ref 5–13)
NEUTS BAND NFR BLD MANUAL: 5 % (ref 0–6)
NEUTS SEG # BLD: 12.2 K/UL (ref 1.8–8)
NEUTS SEG NFR BLD: 84 % (ref 32–75)
NRBC # BLD: 0.02 K/UL (ref 0–0.01)
NRBC BLD-RTO: 0.1 PER 100 WBC
PLATELET # BLD AUTO: 277 K/UL (ref 150–400)
PMV BLD AUTO: 9.9 FL (ref 8.9–12.9)
POTASSIUM SERPL-SCNC: 3.7 MMOL/L (ref 3.5–5.1)
RBC # BLD AUTO: 3.22 M/UL (ref 3.8–5.2)
RBC MORPH BLD: ABNORMAL
SERVICE CMNT-IMP: ABNORMAL
SODIUM SERPL-SCNC: 136 MMOL/L (ref 136–145)
WBC # BLD AUTO: 13.7 K/UL (ref 3.6–11)

## 2022-06-10 PROCEDURE — 36415 COLL VENOUS BLD VENIPUNCTURE: CPT

## 2022-06-10 PROCEDURE — 74011250636 HC RX REV CODE- 250/636: Performed by: HOSPITALIST

## 2022-06-10 PROCEDURE — 65660000001 HC RM ICU INTERMED STEPDOWN

## 2022-06-10 PROCEDURE — 74011000250 HC RX REV CODE- 250: Performed by: HOSPITALIST

## 2022-06-10 PROCEDURE — 74011000636 HC RX REV CODE- 636: Performed by: RADIOLOGY

## 2022-06-10 PROCEDURE — 93308 TTE F-UP OR LMTD: CPT | Performed by: SPECIALIST

## 2022-06-10 PROCEDURE — 94640 AIRWAY INHALATION TREATMENT: CPT

## 2022-06-10 PROCEDURE — 93308 TTE F-UP OR LMTD: CPT

## 2022-06-10 PROCEDURE — 74011000250 HC RX REV CODE- 250: Performed by: FAMILY MEDICINE

## 2022-06-10 PROCEDURE — 93325 DOPPLER ECHO COLOR FLOW MAPG: CPT | Performed by: SPECIALIST

## 2022-06-10 PROCEDURE — 71275 CT ANGIOGRAPHY CHEST: CPT

## 2022-06-10 PROCEDURE — 74011636637 HC RX REV CODE- 636/637: Performed by: HOSPITALIST

## 2022-06-10 PROCEDURE — 85025 COMPLETE CBC W/AUTO DIFF WBC: CPT

## 2022-06-10 PROCEDURE — 82962 GLUCOSE BLOOD TEST: CPT

## 2022-06-10 PROCEDURE — 80048 BASIC METABOLIC PNL TOTAL CA: CPT

## 2022-06-10 PROCEDURE — 74011250637 HC RX REV CODE- 250/637: Performed by: HOSPITALIST

## 2022-06-10 RX ORDER — ACETYLCYSTEINE 200 MG/ML
200 SOLUTION ORAL; RESPIRATORY (INHALATION) 2 TIMES DAILY
Status: DISCONTINUED | OUTPATIENT
Start: 2022-06-10 | End: 2022-06-11

## 2022-06-10 RX ORDER — IPRATROPIUM BROMIDE AND ALBUTEROL SULFATE 2.5; .5 MG/3ML; MG/3ML
3 SOLUTION RESPIRATORY (INHALATION)
Status: DISCONTINUED | OUTPATIENT
Start: 2022-06-10 | End: 2022-06-10

## 2022-06-10 RX ORDER — IPRATROPIUM BROMIDE AND ALBUTEROL SULFATE 2.5; .5 MG/3ML; MG/3ML
3 SOLUTION RESPIRATORY (INHALATION)
Status: DISCONTINUED | OUTPATIENT
Start: 2022-06-10 | End: 2022-06-13

## 2022-06-10 RX ADMIN — Medication 2 UNITS: at 12:53

## 2022-06-10 RX ADMIN — SPIRONOLACTONE 25 MG: 25 TABLET ORAL at 09:19

## 2022-06-10 RX ADMIN — IPRATROPIUM BROMIDE AND ALBUTEROL SULFATE 3 ML: .5; 3 SOLUTION RESPIRATORY (INHALATION) at 19:56

## 2022-06-10 RX ADMIN — DAPSONE 100 MG: 100 TABLET ORAL at 09:19

## 2022-06-10 RX ADMIN — LEVOTHYROXINE SODIUM 100 MCG: 0.1 TABLET ORAL at 07:20

## 2022-06-10 RX ADMIN — LEVOFLOXACIN 750 MG: 5 INJECTION, SOLUTION INTRAVENOUS at 17:15

## 2022-06-10 RX ADMIN — Medication 5 UNITS: at 17:14

## 2022-06-10 RX ADMIN — ENOXAPARIN SODIUM 40 MG: 100 INJECTION SUBCUTANEOUS at 09:18

## 2022-06-10 RX ADMIN — ASPIRIN 81 MG: 81 TABLET, COATED ORAL at 09:19

## 2022-06-10 RX ADMIN — SODIUM CHLORIDE, PRESERVATIVE FREE 10 ML: 5 INJECTION INTRAVENOUS at 06:11

## 2022-06-10 RX ADMIN — METHYLPREDNISOLONE SODIUM SUCCINATE 60 MG: 125 INJECTION, POWDER, FOR SOLUTION INTRAMUSCULAR; INTRAVENOUS at 09:18

## 2022-06-10 RX ADMIN — BUDESONIDE 500 MCG: 0.5 INHALANT RESPIRATORY (INHALATION) at 07:57

## 2022-06-10 RX ADMIN — ZOLPIDEM TARTRATE 5 MG: 5 TABLET ORAL at 21:52

## 2022-06-10 RX ADMIN — SODIUM CHLORIDE, PRESERVATIVE FREE 10 ML: 5 INJECTION INTRAVENOUS at 21:48

## 2022-06-10 RX ADMIN — BUDESONIDE 500 MCG: 0.5 INHALANT RESPIRATORY (INHALATION) at 19:56

## 2022-06-10 RX ADMIN — AMLODIPINE BESYLATE 5 MG: 5 TABLET ORAL at 21:47

## 2022-06-10 RX ADMIN — Medication 2 UNITS: at 07:20

## 2022-06-10 RX ADMIN — FAMOTIDINE 20 MG: 20 TABLET ORAL at 17:14

## 2022-06-10 RX ADMIN — METHYLPREDNISOLONE SODIUM SUCCINATE 60 MG: 125 INJECTION, POWDER, FOR SOLUTION INTRAMUSCULAR; INTRAVENOUS at 21:48

## 2022-06-10 RX ADMIN — DOCUSATE SODIUM 100 MG: 100 CAPSULE, LIQUID FILLED ORAL at 09:19

## 2022-06-10 RX ADMIN — FAMOTIDINE 20 MG: 20 TABLET ORAL at 09:19

## 2022-06-10 RX ADMIN — METHOTREXATE 20 MG: 2.5 TABLET ORAL at 07:20

## 2022-06-10 RX ADMIN — IPRATROPIUM BROMIDE AND ALBUTEROL SULFATE 3 ML: .5; 3 SOLUTION RESPIRATORY (INHALATION) at 14:06

## 2022-06-10 RX ADMIN — FUROSEMIDE 40 MG: 40 TABLET ORAL at 12:53

## 2022-06-10 RX ADMIN — IOPAMIDOL 80 ML: 755 INJECTION, SOLUTION INTRAVENOUS at 12:18

## 2022-06-10 RX ADMIN — ACETYLCYSTEINE 200 MG: 200 SOLUTION ORAL; RESPIRATORY (INHALATION) at 19:56

## 2022-06-10 RX ADMIN — IPRATROPIUM BROMIDE AND ALBUTEROL SULFATE 3 ML: .5; 3 SOLUTION RESPIRATORY (INHALATION) at 07:57

## 2022-06-10 NOTE — PROGRESS NOTES
[address] [facility] Adult  Hospitalist Group                                                                                          Hospitalist Progress Note  Gerald Peña MD  Answering service: [phone] from in house phone        Date of Service:  6/10/2022  NAME:  Niki Donnelly  :  1955  MRN:  [id number]      Admission Summary:   Niki Donnelly is a 78 y.o. female who presents with  Sob. Patient was recently admitted and discharged 2 weeks back from [facility].  Patient was discharged on home oxygen was on 2 L at home and taking medications regularly this morning patient said that she suddenly become so short of breath she was unable to breathe and decided to come to the hospital.  Patient was desaturating to 80s and put back on 6 L at in the ED. patient has past medical history of GPA vasculitis rheumatoid arthritis and managed by Dr. Margarita Esteves at [facility]. Patient also history of Bell's palsy aortic valve insufficiency hypertension rheumatoid arthritis patient chronically on steroids.   Patient has been vaccinated for COVID-19 denies any fever cough shortness of breath or chest pain admitted for further management    Interval history / Subjective:     Patient seen and examined much improved few shortness of breath seen by pulmonary     Assessment & Plan:     Acute on chronic hypoxic respiratory failure due to systemic vasculitis possibly involving lung as well  -- Start steroids IV 60 every 12--continue today taper upon discharge  -- Nebs every 12 Brovana Pulmicort  -- Start antibiotics Levaquin  -- Patient had recent [facility] admission for the same follows at [facility] for GPA vasculitis  --Consult pulmonary     Systemic vasculitis - managed by her rheumatologist  Patient is on methotrexate and Prolia injection  Chronically on steroids  On Rituxan every 6 month     Hypertension continue home medication  Leukocytosis with neurophilia-trending down with antibiotics  hyperglycemia from steroids- monitor        Code status: Full code  DVT prophylaxis: Lovenox    Care Plan discussed with: Patient/Family and Nurse  Anticipated Disposition: Home w/Family  Anticipated Discharge: 24 hours to 48 hours   To Case management consult for dispo planning possibly home tomorrow with home health PT OT     Hospital Problems  Date Reviewed: 9/28/2021          Codes Class Noted POA    * (Principal) Respiratory failure [facility]) ICD-10-CM: J96.90  ICD-9-CM: 518.81  6/8/2022 Unknown                Review of Systems:   A comprehensive review of systems was negative. Vital Signs:    Last 24hrs VS reviewed since prior progress note.  Most recent are:  Visit Vitals  BP (!) 130/58 (BP 1 Location: Left upper arm, BP Patient Position: At rest)   Pulse 87   Temp 98.8 °F (37.1 °C)   Resp 20   Ht 5' 6\" (1.676 m)   Wt 59 kg (130 lb)   SpO2 91%   BMI 20.98 kg/m²       No intake or output data in the 24 hours ending 06/10/22 1152     Physical Examination:     I had a face to face encounter with this patient and independently examined them on 6/10/2022 as outlined below:          General : alert x 3, awake, no acute distress, resting in bed, pleasant   HEENT: PEERL, EOMI, moist mucus membrane, TM clear  Neck: supple, no JVD, no meningeal signs  Chest: Clear to auscultation bilaterally good air entry today  CVS: S1 S2 heard, Capillary refill less than 2 seconds  Abd: soft/ Non tender, non distended, BS physiological,   Ext: no clubbing, no cyanosis, no edema, brisk 2+ DP pulses  Neuro/Psych: pleasant mood and affect, CN 2-12 grossly intact, sensory grossly within normal limit, Strength 5/5 in all extremities, DTR 1+ x 4  Skin: warm     Data Review:    I personally reviewed  Image and labs      Labs:     Recent Labs     06/10/22  0419 06/09/22  0434   WBC 13.7* 13.7*   HGB 10.7* 11.2*   HCT 33.8* 34.7*    250     Recent Labs     06/10/22  0419 06/09/22  0434 06/08/22  1350    136 134*   K 3.7 3.6 3.8    102 98   CO2 30 26 32   BUN 15 10 13   CREA 0.58 0.49* 0.80   * 168* 187*   CA 8.6 8.8 9.9   MG  --   --  2.5*     Recent Labs     06/08/22  1350   ALT 40   AP 81   TBILI 1.1*   TP 7.1   ALB 4.2   GLOB 2.9     No results for input(s): INR, PTP, APTT, INREXT, INREXT in the last 72 hours. No results for input(s): FE, TIBC, PSAT, FERR in the last 72 hours. No results found for: FOL, RBCF   No results for input(s): PH, PCO2, PO2 in the last 72 hours. No results for input(s): CPK, CKNDX, TROIQ in the last 72 hours.     No lab exists for component: CPKMB  Lab Results   Component Value Date/Time    Cholesterol, total 165 04/15/2016 08:45 AM    HDL Cholesterol 70 04/15/2016 08:45 AM    LDL, calculated 78 04/15/2016 08:45 AM    Triglyceride 86 04/15/2016 08:45 AM     Lab Results   Component Value Date/Time    Glucose (POC) 162 (H) 06/10/2022 11:43 AM    Glucose (POC) 171 (H) 06/10/2022 07:08 AM    Glucose (POC) 129 (H) 06/09/2022 09:13 PM    Glucose (POC) 181 (H) 06/09/2022 03:34 PM    Glucose (POC) 178 (H) 06/09/2022 11:23 AM     Lab Results   Component Value Date/Time    Color YELLOW/STRAW 05/14/2021 09:52 AM    Appearance CLEAR 05/14/2021 09:52 AM    Specific gravity 1.017 05/14/2021 09:52 AM    pH (UA) 7.0 05/14/2021 09:52 AM    Protein TRACE (A) 05/14/2021 09:52 AM    Glucose 100 (A) 05/14/2021 09:52 AM    Ketone Negative 05/14/2021 09:52 AM    Bilirubin Negative 05/14/2021 09:52 AM    Urobilinogen 0.2 05/14/2021 09:52 AM    Nitrites Negative 05/14/2021 09:52 AM    Leukocyte Esterase Negative 05/14/2021 09:52 AM    Epithelial cells FEW 05/14/2021 09:52 AM    Bacteria Negative 05/14/2021 09:52 AM    WBC 0-4 05/14/2021 09:52 AM    RBC 0-5 05/14/2021 09:52 AM         Medications Reviewed:     Current Facility-Administered Medications   Medication Dose Route Frequency    albuterol-ipratropium (DUO-NEB) 2.5 MG-0.5 MG/3 ML  3 mL Nebulization QID RT    iopamidoL (ISOVUE-370) 76 % injection 100 mL  100 mL IntraVENous RAD ONCE    zolpidem (AMBIEN) tablet 5 mg  5 mg Oral QHS PRN    docusate sodium (COLACE) capsule 100 mg  100 mg Oral DAILY    sodium chloride (NS) flush 5-40 mL  5-40 mL IntraVENous Q8H    sodium chloride (NS) flush 5-40 mL  5-40 mL IntraVENous PRN    acetaminophen (TYLENOL) tablet 650 mg  650 mg Oral Q6H PRN    Or    acetaminophen (TYLENOL) suppository 650 mg  650 mg Rectal Q6H PRN    polyethylene glycol (MIRALAX) packet 17 g  17 g Oral DAILY PRN    ondansetron (ZOFRAN ODT) tablet 4 mg  4 mg Oral Q8H PRN    Or    ondansetron (ZOFRAN) injection 4 mg  4 mg IntraVENous Q6H PRN    enoxaparin (LOVENOX) injection 40 mg  40 mg SubCUTAneous DAILY    amLODIPine (NORVASC) tablet 5 mg  5 mg Oral QHS    aspirin delayed-release tablet 81 mg  81 mg Oral DAILY    dapsone tablet 100 mg  100 mg Oral DAILY    famotidine (PEPCID) tablet 20 mg  20 mg Oral BID    furosemide (LASIX) tablet 40 mg  40 mg Oral Q MON, WED & FRI    levothyroxine (SYNTHROID) tablet 100 mcg  100 mcg Oral ACB    methotrexate (RHEUMATREX) tablet 20 mg  20 mg Oral every Friday    methylPREDNISolone (PF) (Solu-MEDROL) injection 60 mg  60 mg IntraVENous Q12H    insulin lispro (HUMALOG) injection   SubCUTAneous AC&HS    glucose chewable tablet 16 g  4 Tablet Oral PRN    glucagon (GLUCAGEN) injection 1 mg  1 mg IntraMUSCular PRN    dextrose 10 % infusion 0-250 mL  0-250 mL IntraVENous PRN    levoFLOXacin (LEVAQUIN) 750 mg in D5W IVPB  750 mg IntraVENous Q24H    budesonide (PULMICORT) 500 mcg/2 ml nebulizer suspension  500 mcg Nebulization BID RT    spironolactone (ALDACTONE) tablet 25 mg  25 mg Oral DAILY    dextromethorphan (DELSYM) 30 mg/5 mL syrup 30 mg  30 mg Oral Q12H PRN     ______________________________________________________________________  EXPECTED LENGTH OF STAY: 3d 21h  ACTUAL LENGTH OF STAY:          2      Please note that this dictation was completed with [employer], the computer voice recognition software.   Quite often unanticipated grammatical, syntax, homophones, and other interpretive errors are inadvertently transcribed by the computer software. Please disregard these errors. Please excuse any errors that have escaped final proofreading.                 Kodak Bethea MD

## 2022-06-10 NOTE — PROGRESS NOTES
PHIL:  RUR: 15%    Disposition:  Home with HH PT    CM spoke with Dr. Lilian Dickinson this morning. He plans to d/c patient home tomorrow (Sat 6/11/22) with HH/PT. CM continuing to follow. HUNTER GutierrezW, [address] [facility] AT [place] has accepted patient for servicing.   Updates place on AVS.

## 2022-06-10 NOTE — ROUTINE PROCESS
Bedside and Verbal shift change report given to FAM Seals (oncoming nurse) by Patricia Sharpe (offgoing nurse). Report included the following information SBAR, Kardex, Intake/Output, MAR, Recent Results and Cardiac Rhythm NSR.
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
## 2022-06-11 LAB
ANION GAP SERPL CALC-SCNC: 6 MMOL/L (ref 5–15)
BASOPHILS # BLD: 0 K/UL (ref 0–0.1)
BASOPHILS NFR BLD: 0 % (ref 0–1)
BUN SERPL-MCNC: 14 MG/DL (ref 6–20)
BUN/CREAT SERPL: 30 (ref 12–20)
CALCIUM SERPL-MCNC: 7.9 MG/DL (ref 8.5–10.1)
CHLORIDE SERPL-SCNC: 100 MMOL/L (ref 97–108)
CO2 SERPL-SCNC: 30 MMOL/L (ref 21–32)
CREAT SERPL-MCNC: 0.47 MG/DL (ref 0.55–1.02)
DIFFERENTIAL METHOD BLD: ABNORMAL
EOSINOPHIL # BLD: 0 K/UL (ref 0–0.4)
EOSINOPHIL NFR BLD: 0 % (ref 0–7)
ERYTHROCYTE [DISTWIDTH] IN BLOOD BY AUTOMATED COUNT: 17.7 % (ref 11.5–14.5)
GLUCOSE BLD STRIP.AUTO-MCNC: 122 MG/DL (ref 65–117)
GLUCOSE BLD STRIP.AUTO-MCNC: 148 MG/DL (ref 65–117)
GLUCOSE BLD STRIP.AUTO-MCNC: 155 MG/DL (ref 65–117)
GLUCOSE BLD STRIP.AUTO-MCNC: 172 MG/DL (ref 65–117)
GLUCOSE SERPL-MCNC: 176 MG/DL (ref 65–100)
HCT VFR BLD AUTO: 33.2 % (ref 35–47)
HGB BLD-MCNC: 10.8 G/DL (ref 11.5–16)
IMM GRANULOCYTES # BLD AUTO: 0 K/UL
IMM GRANULOCYTES NFR BLD AUTO: 0 %
LYMPHOCYTES # BLD: 0.7 K/UL (ref 0.8–3.5)
LYMPHOCYTES NFR BLD: 6 % (ref 12–49)
MCH RBC QN AUTO: 33.8 PG (ref 26–34)
MCHC RBC AUTO-ENTMCNC: 32.5 G/DL (ref 30–36.5)
MCV RBC AUTO: 103.8 FL (ref 80–99)
METAMYELOCYTES NFR BLD MANUAL: 3 %
MONOCYTES # BLD: 0.5 K/UL (ref 0–1)
MONOCYTES NFR BLD: 4 % (ref 5–13)
NEUTS BAND NFR BLD MANUAL: 5 % (ref 0–6)
NEUTS SEG # BLD: 10.3 K/UL (ref 1.8–8)
NEUTS SEG NFR BLD: 82 % (ref 32–75)
NRBC # BLD: 0 K/UL (ref 0–0.01)
NRBC BLD-RTO: 0 PER 100 WBC
PLATELET # BLD AUTO: 237 K/UL (ref 150–400)
PMV BLD AUTO: 10 FL (ref 8.9–12.9)
POTASSIUM SERPL-SCNC: 3.3 MMOL/L (ref 3.5–5.1)
RBC # BLD AUTO: 3.2 M/UL (ref 3.8–5.2)
RBC MORPH BLD: ABNORMAL
SERVICE CMNT-IMP: ABNORMAL
SODIUM SERPL-SCNC: 136 MMOL/L (ref 136–145)
WBC # BLD AUTO: 11.8 K/UL (ref 3.6–11)

## 2022-06-11 PROCEDURE — 94640 AIRWAY INHALATION TREATMENT: CPT

## 2022-06-11 PROCEDURE — 74011000250 HC RX REV CODE- 250: Performed by: HOSPITALIST

## 2022-06-11 PROCEDURE — 94760 N-INVAS EAR/PLS OXIMETRY 1: CPT

## 2022-06-11 PROCEDURE — 74011000250 HC RX REV CODE- 250: Performed by: FAMILY MEDICINE

## 2022-06-11 PROCEDURE — 74011636637 HC RX REV CODE- 636/637: Performed by: HOSPITALIST

## 2022-06-11 PROCEDURE — 74011250637 HC RX REV CODE- 250/637: Performed by: PHYSICIAN ASSISTANT

## 2022-06-11 PROCEDURE — 85025 COMPLETE CBC W/AUTO DIFF WBC: CPT

## 2022-06-11 PROCEDURE — 65660000001 HC RM ICU INTERMED STEPDOWN

## 2022-06-11 PROCEDURE — 74011250637 HC RX REV CODE- 250/637: Performed by: HOSPITALIST

## 2022-06-11 PROCEDURE — 82962 GLUCOSE BLOOD TEST: CPT

## 2022-06-11 PROCEDURE — 80048 BASIC METABOLIC PNL TOTAL CA: CPT

## 2022-06-11 PROCEDURE — 36415 COLL VENOUS BLD VENIPUNCTURE: CPT

## 2022-06-11 PROCEDURE — 74011250636 HC RX REV CODE- 250/636: Performed by: HOSPITALIST

## 2022-06-11 PROCEDURE — 74011250637 HC RX REV CODE- 250/637: Performed by: NURSE PRACTITIONER

## 2022-06-11 RX ORDER — PANTOPRAZOLE SODIUM 40 MG/1
40 TABLET, DELAYED RELEASE ORAL
Status: DISCONTINUED | OUTPATIENT
Start: 2022-06-12 | End: 2022-06-13 | Stop reason: HOSPADM

## 2022-06-11 RX ORDER — CALCIUM CARBONATE 500(1250)
500 TABLET ORAL
Status: DISCONTINUED | OUTPATIENT
Start: 2022-06-11 | End: 2022-06-13 | Stop reason: HOSPADM

## 2022-06-11 RX ORDER — LANOLIN ALCOHOL/MO/W.PET/CERES
5000 CREAM (GRAM) TOPICAL DAILY
Status: DISCONTINUED | OUTPATIENT
Start: 2022-06-12 | End: 2022-06-13 | Stop reason: HOSPADM

## 2022-06-11 RX ORDER — PRAVASTATIN SODIUM 20 MG/1
20 TABLET ORAL
Status: DISCONTINUED | OUTPATIENT
Start: 2022-06-11 | End: 2022-06-13 | Stop reason: HOSPADM

## 2022-06-11 RX ORDER — GUAIFENESIN 600 MG/1
600 TABLET, EXTENDED RELEASE ORAL EVERY 12 HOURS
Status: DISCONTINUED | OUTPATIENT
Start: 2022-06-11 | End: 2022-06-13 | Stop reason: HOSPADM

## 2022-06-11 RX ORDER — MELATONIN
2000 DAILY
Status: DISCONTINUED | OUTPATIENT
Start: 2022-06-12 | End: 2022-06-13 | Stop reason: HOSPADM

## 2022-06-11 RX ORDER — LEUCOVORIN CALCIUM 5 MG/1
5 TABLET ORAL
Status: DISCONTINUED | OUTPATIENT
Start: 2022-06-11 | End: 2022-06-13 | Stop reason: HOSPADM

## 2022-06-11 RX ORDER — ACETYLCYSTEINE 200 MG/ML
200 SOLUTION ORAL; RESPIRATORY (INHALATION)
Status: DISCONTINUED | OUTPATIENT
Start: 2022-06-11 | End: 2022-06-13 | Stop reason: HOSPADM

## 2022-06-11 RX ORDER — NYSTATIN 100000 [USP'U]/ML
500000 SUSPENSION ORAL 4 TIMES DAILY
Status: DISCONTINUED | OUTPATIENT
Start: 2022-06-11 | End: 2022-06-13 | Stop reason: HOSPADM

## 2022-06-11 RX ADMIN — GUAIFENESIN 600 MG: 600 TABLET, EXTENDED RELEASE ORAL at 11:05

## 2022-06-11 RX ADMIN — BUDESONIDE 500 MCG: 0.5 INHALANT RESPIRATORY (INHALATION) at 19:33

## 2022-06-11 RX ADMIN — DAPSONE 100 MG: 100 TABLET ORAL at 11:17

## 2022-06-11 RX ADMIN — LEUCOVORIN CALCIUM 5 MG: 5 TABLET ORAL at 13:18

## 2022-06-11 RX ADMIN — IPRATROPIUM BROMIDE AND ALBUTEROL SULFATE 3 ML: .5; 3 SOLUTION RESPIRATORY (INHALATION) at 08:00

## 2022-06-11 RX ADMIN — SODIUM CHLORIDE, PRESERVATIVE FREE 10 ML: 5 INJECTION INTRAVENOUS at 06:34

## 2022-06-11 RX ADMIN — ACETYLCYSTEINE 200 MG: 200 SOLUTION ORAL; RESPIRATORY (INHALATION) at 19:33

## 2022-06-11 RX ADMIN — METHYLPREDNISOLONE SODIUM SUCCINATE 60 MG: 125 INJECTION, POWDER, FOR SOLUTION INTRAMUSCULAR; INTRAVENOUS at 11:06

## 2022-06-11 RX ADMIN — SPIRONOLACTONE 25 MG: 25 TABLET ORAL at 11:06

## 2022-06-11 RX ADMIN — ZOLPIDEM TARTRATE 5 MG: 5 TABLET ORAL at 22:56

## 2022-06-11 RX ADMIN — SODIUM CHLORIDE, PRESERVATIVE FREE 10 ML: 5 INJECTION INTRAVENOUS at 22:56

## 2022-06-11 RX ADMIN — PRAVASTATIN SODIUM 20 MG: 20 TABLET ORAL at 22:31

## 2022-06-11 RX ADMIN — Medication 2 UNITS: at 19:25

## 2022-06-11 RX ADMIN — AMLODIPINE BESYLATE 5 MG: 5 TABLET ORAL at 22:31

## 2022-06-11 RX ADMIN — BUDESONIDE 500 MCG: 0.5 INHALANT RESPIRATORY (INHALATION) at 08:00

## 2022-06-11 RX ADMIN — NYSTATIN 500000 UNITS: 100000 SUSPENSION ORAL at 22:37

## 2022-06-11 RX ADMIN — CALCIUM 500 MG: 500 TABLET ORAL at 13:18

## 2022-06-11 RX ADMIN — IPRATROPIUM BROMIDE AND ALBUTEROL SULFATE 3 ML: .5; 3 SOLUTION RESPIRATORY (INHALATION) at 15:47

## 2022-06-11 RX ADMIN — CALCIUM 500 MG: 500 TABLET ORAL at 19:26

## 2022-06-11 RX ADMIN — FAMOTIDINE 20 MG: 20 TABLET ORAL at 19:25

## 2022-06-11 RX ADMIN — LEVOTHYROXINE SODIUM 100 MCG: 0.1 TABLET ORAL at 06:36

## 2022-06-11 RX ADMIN — LEVOFLOXACIN 750 MG: 5 INJECTION, SOLUTION INTRAVENOUS at 19:20

## 2022-06-11 RX ADMIN — SODIUM CHLORIDE, PRESERVATIVE FREE 10 ML: 5 INJECTION INTRAVENOUS at 13:21

## 2022-06-11 RX ADMIN — ASPIRIN 81 MG: 81 TABLET, COATED ORAL at 11:03

## 2022-06-11 RX ADMIN — METHYLPREDNISOLONE SODIUM SUCCINATE 60 MG: 125 INJECTION, POWDER, FOR SOLUTION INTRAMUSCULAR; INTRAVENOUS at 22:56

## 2022-06-11 RX ADMIN — FAMOTIDINE 20 MG: 20 TABLET ORAL at 11:05

## 2022-06-11 RX ADMIN — ACETYLCYSTEINE 200 MG: 200 SOLUTION ORAL; RESPIRATORY (INHALATION) at 08:01

## 2022-06-11 RX ADMIN — GUAIFENESIN 600 MG: 600 TABLET, EXTENDED RELEASE ORAL at 22:31

## 2022-06-11 RX ADMIN — IPRATROPIUM BROMIDE AND ALBUTEROL SULFATE 3 ML: .5; 3 SOLUTION RESPIRATORY (INHALATION) at 19:33

## 2022-06-11 RX ADMIN — Medication 2 UNITS: at 07:19
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
## 2022-06-11 NOTE — PROGRESS NOTES
[facility]                                                                                          Hospitalist Progress Note  Karen Sanchez MD  Answering service: [phone] from in house phone        Date of Service:  2022  NAME:  Yanci Alvarado  :  1955  MRN:  [id number]      Admission Summary:   Yanci Alvarado is a 78 y.o. female who presents with  Sob. Patient was recently admitted and discharged 2 weeks back from [facility].  Patient was discharged on home oxygen was on 2 L at home and taking medications regularly this morning patient said that she suddenly become so short of breath she was unable to breathe and decided to come to the hospital.  Patient was desaturating to 80s and put back on 6 L at in the ED. patient has past medical history of GPA vasculitis rheumatoid arthritis and managed by Dr. Rosa Maria Lucas Spring at [address] [place] [address]. Patient also history of Bell's palsy aortic valve insufficiency hypertension rheumatoid arthritis patient chronically on steroids.   Patient has been vaccinated for COVID-19 denies any fever cough shortness of breath or chest pain admitted for further management    Interval history / Subjective:     Patient seen and examined   Looks more in respiratory distress could not sleep last night as well more coughing  Added Mucinex with breathing treatment yesterday seen by pulmonary     Assessment & Plan:     Acute on chronic hypoxic respiratory failure due to systemic vasculitis possibly involving lung as well  -- Start steroids IV 60 every 12--continue today taper upon discharge  -- Nebs every 12 Brovana Pulmicort added Mucinex  --  On antibiotics Levaquin  -- Patient had recent [address] [place] [address] admission for the same follows at [address] [place] [address] for GPA vasculitis  --Consult pulmonary--following  -- CT of the chest did not show any PE--reviewed rest of the result with patient there is a acute Acute T5 vertebral body fracture with approximately 25% height loss  --Discussed with the patient continue vitamin D and calcium  --PT OT when able     Systemic vasculitis - managed by her rheumatologist  Patient is on methotrexate and Prolia injection  Chronically on steroids  On Rituxan every 6 month  Continue leucovorin continue calcium vitamin D3     Hypertension continue home medication    Leukocytosis with neurophilia-trending down with antibiotics    hyperglycemia from steroids- monitor     Hypokalemia replete IV     Code status: Full code  DVT prophylaxis: Lovenox    Care Plan discussed with: Patient/Family and Nurse  Anticipated Disposition: Home w/Family  Anticipated Discharge: 24 hours to 48 hours     To Case management consult for dispo planning possibly home next week when improved home health PT OT     Hospital Problems  Date Reviewed: 9/28/2021          Codes Class Noted POA    * (Principal) Respiratory failure ([facility] ICD-10-CM: J96.90  ICD-9-CM: 518.81  6/8/2022 Unknown                Review of Systems:   A comprehensive review of systems was negative. Vital Signs:    Last 24hrs VS reviewed since prior progress note.  Most recent are:  Visit Vitals  BP (!) 149/65 (BP 1 Location: Left upper arm, BP Patient Position: At rest)   Pulse 93   Temp 99.3 °F (37.4 °C)   Resp 18   Ht 5' 6\" (1.676 m)   Wt 59 kg (130 lb)   SpO2 93%   BMI 20.98 kg/m²       No intake or output data in the 24 hours ending 06/11/22 1124     Physical Examination:     I had a face to face encounter with this patient and independently examined them on 6/11/2022 as outlined below:          General : alert x 3, awake, no acute distress, resting in bed, pleasant   HEENT: PEERL, EOMI, moist mucus membrane, TM clear  Neck: supple, no JVD, no meningeal signs  Chest: Poor air entry wheezing ++  CVS: S1 S2 heard, Capillary refill less than 2 seconds  Abd: soft/ Non tender, non distended, BS physiological,   Ext: no clubbing, no cyanosis, no edema, brisk 2+ DP pulses  Neuro/Psych: pleasant mood and affect, CN 2-12 grossly intact, sensory grossly within normal limit, Strength 5/5 in all extremities, DTR 1+ x 4  Skin: warm     Data Review:    I personally reviewed  Image and labs      Labs:     Recent Labs     06/11/22  0438 06/10/22  0419   WBC 11.8* 13.7*   HGB 10.8* 10.7*   HCT 33.2* 33.8*    277     Recent Labs     06/11/22 0438 06/10/22  0419 06/09/22  0434 06/08/22  1350 06/08/22  1350    136 136   < > 134*   K 3.3* 3.7 3.6   < > 3.8    103 102   < > 98   CO2 30 30 26   < > 32   BUN 14 15 10   < > 13   CREA 0.47* 0.58 0.49*   < > 0.80   * 199* 168*   < > 187*   CA 7.9* 8.6 8.8   < > 9.9   MG  --   --   --   --  2.5*    < > = values in this interval not displayed. Recent Labs     06/08/22  1350   ALT 40   AP 81   TBILI 1.1*   TP 7.1   ALB 4.2   GLOB 2.9     No results for input(s): INR, PTP, APTT, INREXT, INREXT in the last 72 hours. No results for input(s): FE, TIBC, PSAT, FERR in the last 72 hours. No results found for: FOL, RBCF   No results for input(s): PH, PCO2, PO2 in the last 72 hours. No results for input(s): CPK, CKNDX, TROIQ in the last 72 hours.     No lab exists for component: CPKMB  Lab Results   Component Value Date/Time    Cholesterol, total 165 04/15/2016 08:45 AM    HDL Cholesterol 70 04/15/2016 08:45 AM    LDL, calculated 78 04/15/2016 08:45 AM    Triglyceride 86 04/15/2016 08:45 AM     Lab Results   Component Value Date/Time    Glucose (POC) 172 (H) 06/11/2022 06:38 AM    Glucose (POC) 135 (H) 06/10/2022 09:44 PM    Glucose (POC) 252 (H) 06/10/2022 03:48 PM    Glucose (POC) 162 (H) 06/10/2022 11:43 AM    Glucose (POC) 171 (H) 06/10/2022 07:08 AM     Lab Results   Component Value Date/Time    Color YELLOW/STRAW 05/14/2021 09:52 AM    Appearance CLEAR 05/14/2021 09:52 AM    Specific gravity 1.017 05/14/2021 09:52 AM    pH (UA) 7.0 05/14/2021 09:52 AM    Protein TRACE (A) 05/14/2021 09:52 AM    Glucose 100 (A) 05/14/2021 09:52 AM    Ketone Negative 05/14/2021 09:52 AM    Bilirubin Negative 05/14/2021 09:52 AM    Urobilinogen 0.2 05/14/2021 09:52 AM    Nitrites Negative 05/14/2021 09:52 AM    Leukocyte Esterase Negative 05/14/2021 09:52 AM    Epithelial cells FEW 05/14/2021 09:52 AM    Bacteria Negative 05/14/2021 09:52 AM    WBC 0-4 05/14/2021 09:52 AM    RBC 0-5 05/14/2021 09:52 AM         Medications Reviewed:     Current Facility-Administered Medications   Medication Dose Route Frequency    acetylcysteine (MUCOMYST) 200 mg/mL (20 %) solution 200 mg  200 mg Nebulization BID RT    guaiFENesin ER (MUCINEX) tablet 600 mg  600 mg Oral Q12H    [START ON 6/12/2022] pantoprazole (PROTONIX) tablet 40 mg  40 mg Oral ACB    [START ON 6/12/2022] cholecalciferol (vitamin D3) tab 2,000 Units  1 Tablet Oral DAILY    [START ON 6/12/2022] cyanocobalamin (VITAMIN B12) tablet 5,000 mcg  5,000 mcg Oral DAILY    leucovorin calcium (WELLCOVORIN) tablet 5 mg  5 mg Oral Q7D    pravastatin (PRAVACHOL) tablet 20 mg  20 mg Oral QHS    calcium carbonate (OS-MAGDI) tablet 500 mg [elemental]  500 mg Oral TID WITH MEALS    albuterol-ipratropium (DUO-NEB) 2.5 MG-0.5 MG/3 ML  3 mL Nebulization TID RT    zolpidem (AMBIEN) tablet 5 mg  5 mg Oral QHS PRN    docusate sodium (COLACE) capsule 100 mg  100 mg Oral DAILY    sodium chloride (NS) flush 5-40 mL  5-40 mL IntraVENous Q8H    sodium chloride (NS) flush 5-40 mL  5-40 mL IntraVENous PRN    acetaminophen (TYLENOL) tablet 650 mg  650 mg Oral Q6H PRN    Or    acetaminophen (TYLENOL) suppository 650 mg  650 mg Rectal Q6H PRN    polyethylene glycol (MIRALAX) packet 17 g  17 g Oral DAILY PRN    ondansetron (ZOFRAN ODT) tablet 4 mg  4 mg Oral Q8H PRN    Or    ondansetron (ZOFRAN) injection 4 mg  4 mg IntraVENous Q6H PRN    enoxaparin (LOVENOX) injection 40 mg  40 mg SubCUTAneous DAILY    amLODIPine (NORVASC) tablet 5 mg  5 mg Oral QHS    aspirin delayed-release tablet 81 mg  81 mg Oral DAILY    dapsone tablet 100 mg  100 mg Oral DAILY    famotidine (PEPCID) tablet 20 mg  20 mg Oral BID    furosemide (LASIX) tablet 40 mg  40 mg Oral Q MON, WED & FRI    levothyroxine (SYNTHROID) tablet 100 mcg  100 mcg Oral ACB    methotrexate (RHEUMATREX) tablet 20 mg  20 mg Oral every Friday    methylPREDNISolone (PF) (Solu-MEDROL) injection 60 mg  60 mg IntraVENous Q12H    insulin lispro (HUMALOG) injection   SubCUTAneous AC&HS    glucose chewable tablet 16 g  4 Tablet Oral PRN    glucagon (GLUCAGEN) injection 1 mg  1 mg IntraMUSCular PRN    dextrose 10 % infusion 0-250 mL  0-250 mL IntraVENous PRN    levoFLOXacin (LEVAQUIN) 750 mg in D5W IVPB  750 mg IntraVENous Q24H    budesonide (PULMICORT) 500 mcg/2 ml nebulizer suspension  500 mcg Nebulization BID RT    spironolactone (ALDACTONE) tablet 25 mg  25 mg Oral DAILY    dextromethorphan (DELSYM) 30 mg/5 mL syrup 30 mg  30 mg Oral Q12H PRN     ______________________________________________________________________  EXPECTED LENGTH OF STAY: 3d 21h  ACTUAL LENGTH OF STAY:          3      Please note that this dictation was completed with [employer], the computer voice recognition software. Quite often unanticipated grammatical, syntax, homophones, and other interpretive errors are inadvertently transcribed by the computer software. Please disregard these errors. Please excuse any errors that have escaped final proofreading.                 Jyoti Baldwin MD

## 2022-06-12 LAB
ANION GAP SERPL CALC-SCNC: 6 MMOL/L (ref 5–15)
BASOPHILS # BLD: 0 K/UL (ref 0–0.1)
BASOPHILS NFR BLD: 0 % (ref 0–1)
BUN SERPL-MCNC: 17 MG/DL (ref 6–20)
BUN/CREAT SERPL: 28 (ref 12–20)
CALCIUM SERPL-MCNC: 8.9 MG/DL (ref 8.5–10.1)
CHLORIDE SERPL-SCNC: 97 MMOL/L (ref 97–108)
CO2 SERPL-SCNC: 33 MMOL/L (ref 21–32)
CREAT SERPL-MCNC: 0.61 MG/DL (ref 0.55–1.02)
DIFFERENTIAL METHOD BLD: ABNORMAL
EOSINOPHIL # BLD: 0 K/UL (ref 0–0.4)
EOSINOPHIL NFR BLD: 0 % (ref 0–7)
ERYTHROCYTE [DISTWIDTH] IN BLOOD BY AUTOMATED COUNT: 17.9 % (ref 11.5–14.5)
GLUCOSE BLD STRIP.AUTO-MCNC: 128 MG/DL (ref 65–117)
GLUCOSE BLD STRIP.AUTO-MCNC: 162 MG/DL (ref 65–117)
GLUCOSE BLD STRIP.AUTO-MCNC: 178 MG/DL (ref 65–117)
GLUCOSE BLD STRIP.AUTO-MCNC: 196 MG/DL (ref 65–117)
GLUCOSE SERPL-MCNC: 136 MG/DL (ref 65–100)
HCT VFR BLD AUTO: 36.3 % (ref 35–47)
HGB BLD-MCNC: 11.3 G/DL (ref 11.5–16)
IMM GRANULOCYTES # BLD AUTO: 0 K/UL
IMM GRANULOCYTES NFR BLD AUTO: 0 %
LYMPHOCYTES # BLD: 1.2 K/UL (ref 0.8–3.5)
LYMPHOCYTES NFR BLD: 9 % (ref 12–49)
MCH RBC QN AUTO: 32.7 PG (ref 26–34)
MCHC RBC AUTO-ENTMCNC: 31.1 G/DL (ref 30–36.5)
MCV RBC AUTO: 104.9 FL (ref 80–99)
METAMYELOCYTES NFR BLD MANUAL: 1 %
MONOCYTES # BLD: 0.5 K/UL (ref 0–1)
MONOCYTES NFR BLD: 4 % (ref 5–13)
NEUTS BAND NFR BLD MANUAL: 3 % (ref 0–6)
NEUTS SEG # BLD: 11 K/UL (ref 1.8–8)
NEUTS SEG NFR BLD: 83 % (ref 32–75)
NRBC # BLD: 0 K/UL (ref 0–0.01)
NRBC BLD-RTO: 0 PER 100 WBC
PLATELET # BLD AUTO: 264 K/UL (ref 150–400)
PMV BLD AUTO: 9.7 FL (ref 8.9–12.9)
POTASSIUM SERPL-SCNC: 2.8 MMOL/L (ref 3.5–5.1)
RBC # BLD AUTO: 3.46 M/UL (ref 3.8–5.2)
RBC MORPH BLD: ABNORMAL
RBC MORPH BLD: ABNORMAL
SERVICE CMNT-IMP: ABNORMAL
SODIUM SERPL-SCNC: 136 MMOL/L (ref 136–145)
WBC # BLD AUTO: 12.8 K/UL (ref 3.6–11)

## 2022-06-12 PROCEDURE — 74011250636 HC RX REV CODE- 250/636: Performed by: HOSPITALIST

## 2022-06-12 PROCEDURE — 36415 COLL VENOUS BLD VENIPUNCTURE: CPT

## 2022-06-12 PROCEDURE — 77010033678 HC OXYGEN DAILY

## 2022-06-12 PROCEDURE — 74011250637 HC RX REV CODE- 250/637: Performed by: PHYSICIAN ASSISTANT

## 2022-06-12 PROCEDURE — 74011250637 HC RX REV CODE- 250/637: Performed by: HOSPITALIST

## 2022-06-12 PROCEDURE — 74011000250 HC RX REV CODE- 250: Performed by: FAMILY MEDICINE

## 2022-06-12 PROCEDURE — 85025 COMPLETE CBC W/AUTO DIFF WBC: CPT

## 2022-06-12 PROCEDURE — 74011000250 HC RX REV CODE- 250: Performed by: HOSPITALIST

## 2022-06-12 PROCEDURE — 65660000001 HC RM ICU INTERMED STEPDOWN

## 2022-06-12 PROCEDURE — 82962 GLUCOSE BLOOD TEST: CPT

## 2022-06-12 PROCEDURE — 74011250637 HC RX REV CODE- 250/637: Performed by: FAMILY MEDICINE

## 2022-06-12 PROCEDURE — 74011250637 HC RX REV CODE- 250/637: Performed by: NURSE PRACTITIONER

## 2022-06-12 PROCEDURE — 80048 BASIC METABOLIC PNL TOTAL CA: CPT

## 2022-06-12 PROCEDURE — 74011636637 HC RX REV CODE- 636/637: Performed by: HOSPITALIST

## 2022-06-12 PROCEDURE — 94640 AIRWAY INHALATION TREATMENT: CPT

## 2022-06-12 PROCEDURE — 74011250636 HC RX REV CODE- 250/636: Performed by: NURSE PRACTITIONER

## 2022-06-12 PROCEDURE — 94760 N-INVAS EAR/PLS OXIMETRY 1: CPT

## 2022-06-12 RX ORDER — POTASSIUM CHLORIDE 750 MG/1
40 TABLET, FILM COATED, EXTENDED RELEASE ORAL DAILY
Status: DISCONTINUED | OUTPATIENT
Start: 2022-06-12 | End: 2022-06-13 | Stop reason: HOSPADM

## 2022-06-12 RX ORDER — POTASSIUM CHLORIDE 7.45 MG/ML
10 INJECTION INTRAVENOUS
Status: COMPLETED | OUTPATIENT
Start: 2022-06-12 | End: 2022-06-12

## 2022-06-12 RX ADMIN — ASPIRIN 81 MG: 81 TABLET, COATED ORAL at 08:30

## 2022-06-12 RX ADMIN — LEVOFLOXACIN 750 MG: 5 INJECTION, SOLUTION INTRAVENOUS at 17:25

## 2022-06-12 RX ADMIN — DAPSONE 100 MG: 100 TABLET ORAL at 08:42

## 2022-06-12 RX ADMIN — METHYLPREDNISOLONE SODIUM SUCCINATE 60 MG: 125 INJECTION, POWDER, FOR SOLUTION INTRAMUSCULAR; INTRAVENOUS at 21:28

## 2022-06-12 RX ADMIN — IPRATROPIUM BROMIDE AND ALBUTEROL SULFATE 3 ML: .5; 3 SOLUTION RESPIRATORY (INHALATION) at 19:41

## 2022-06-12 RX ADMIN — ACETYLCYSTEINE 200 MG: 200 SOLUTION ORAL; RESPIRATORY (INHALATION) at 19:40

## 2022-06-12 RX ADMIN — CALCIUM 500 MG: 500 TABLET ORAL at 12:00

## 2022-06-12 RX ADMIN — PANTOPRAZOLE SODIUM 40 MG: 40 TABLET, DELAYED RELEASE ORAL at 07:21

## 2022-06-12 RX ADMIN — CALCIUM 500 MG: 500 TABLET ORAL at 17:18

## 2022-06-12 RX ADMIN — CYANOCOBALAMIN TAB 500 MCG 5000 MCG: 500 TAB at 08:30

## 2022-06-12 RX ADMIN — ZOLPIDEM TARTRATE 5 MG: 5 TABLET ORAL at 21:28

## 2022-06-12 RX ADMIN — METHYLPREDNISOLONE SODIUM SUCCINATE 60 MG: 125 INJECTION, POWDER, FOR SOLUTION INTRAMUSCULAR; INTRAVENOUS at 08:41

## 2022-06-12 RX ADMIN — GUAIFENESIN 600 MG: 600 TABLET, EXTENDED RELEASE ORAL at 08:30

## 2022-06-12 RX ADMIN — SODIUM CHLORIDE, PRESERVATIVE FREE 10 ML: 5 INJECTION INTRAVENOUS at 07:20

## 2022-06-12 RX ADMIN — GUAIFENESIN 600 MG: 600 TABLET, EXTENDED RELEASE ORAL at 21:28

## 2022-06-12 RX ADMIN — NYSTATIN 500000 UNITS: 100000 SUSPENSION ORAL at 17:18

## 2022-06-12 RX ADMIN — Medication 2 UNITS: at 11:59

## 2022-06-12 RX ADMIN — AMLODIPINE BESYLATE 5 MG: 5 TABLET ORAL at 21:28

## 2022-06-12 RX ADMIN — POTASSIUM CHLORIDE 10 MEQ: 7.46 INJECTION, SOLUTION INTRAVENOUS at 04:36

## 2022-06-12 RX ADMIN — NYSTATIN 500000 UNITS: 100000 SUSPENSION ORAL at 12:00

## 2022-06-12 RX ADMIN — POTASSIUM CHLORIDE 40 MEQ: 750 TABLET, FILM COATED, EXTENDED RELEASE ORAL at 12:00

## 2022-06-12 RX ADMIN — POTASSIUM CHLORIDE 10 MEQ: 7.46 INJECTION, SOLUTION INTRAVENOUS at 06:30

## 2022-06-12 RX ADMIN — IPRATROPIUM BROMIDE AND ALBUTEROL SULFATE 3 ML: .5; 3 SOLUTION RESPIRATORY (INHALATION) at 12:51

## 2022-06-12 RX ADMIN — Medication 2000 UNITS: at 08:30

## 2022-06-12 RX ADMIN — NYSTATIN 500000 UNITS: 100000 SUSPENSION ORAL at 08:41

## 2022-06-12 RX ADMIN — PRAVASTATIN SODIUM 20 MG: 20 TABLET ORAL at 21:28

## 2022-06-12 RX ADMIN — POTASSIUM CHLORIDE 10 MEQ: 7.46 INJECTION, SOLUTION INTRAVENOUS at 03:26

## 2022-06-12 RX ADMIN — BUDESONIDE 500 MCG: 0.5 INHALANT RESPIRATORY (INHALATION) at 07:15

## 2022-06-12 RX ADMIN — NYSTATIN 500000 UNITS: 100000 SUSPENSION ORAL at 21:28

## 2022-06-12 RX ADMIN — LEVOTHYROXINE SODIUM 100 MCG: 0.1 TABLET ORAL at 07:21

## 2022-06-12 RX ADMIN — Medication 2 UNITS: at 17:18

## 2022-06-12 RX ADMIN — SPIRONOLACTONE 25 MG: 25 TABLET ORAL at 08:41

## 2022-06-12 RX ADMIN — SODIUM CHLORIDE, PRESERVATIVE FREE 10 ML: 5 INJECTION INTRAVENOUS at 21:30

## 2022-06-12 RX ADMIN — IPRATROPIUM BROMIDE AND ALBUTEROL SULFATE 3 ML: .5; 3 SOLUTION RESPIRATORY (INHALATION) at 07:14

## 2022-06-12 RX ADMIN — Medication 2 UNITS: at 08:29

## 2022-06-12 RX ADMIN — CALCIUM 500 MG: 500 TABLET ORAL at 08:31

## 2022-06-12 RX ADMIN — SODIUM CHLORIDE, PRESERVATIVE FREE 10 ML: 5 INJECTION INTRAVENOUS at 14:00

## 2022-06-12 RX ADMIN — BUDESONIDE 500 MCG: 0.5 INHALANT RESPIRATORY (INHALATION) at 19:41

## 2022-06-12 RX ADMIN — ACETYLCYSTEINE 200 MG: 200 SOLUTION ORAL; RESPIRATORY (INHALATION) at 07:15

## 2022-06-12 NOTE — PROGRESS NOTES
Bedside and Verbal shift change report given to Janessa (oncoming nurse) by Patrica Little (offgoing nurse).  Report included the following information SBAR, Kardex, MAR, Accordion and Cardiac Rhythm SR.

## 2022-06-12 NOTE — PROGRESS NOTES
Hospitalist Progress Note          Aracelis Matta MD  Please call  and page for questions. Call physician on-call through the  7pm-7am    Daily Progress Note: 6/12/2022    Primary care Juan Charlton MD    Date of admission: 6/8/2022  1:30 PM    Admission summery and hospital course:  [place] a 78 y.o. female who presents with  Sob.  Patient was recently admitted and discharged 2 weeks back from [address] [place] [address] Way was discharged on home oxygen was on 2 L at home and taking medications regularly this morning patient said that she suddenly become so short of breath she was unable to breathe and decided to come to the hospital.  Patient was desaturating to 80s and put back on 6 L at in the ED. patient has past medical history of GPA vasculitis rheumatoid arthritis and managed by Dr. Beck Esteves at [address] [place] [address] also history of Bell's palsy aortic valve insufficiency hypertension rheumatoid arthritis patient chronically on steroids.  Patient has been vaccinated for COVID-19 denies any fever cough shortness of breath or chest pain admitted for further management.      Subjective:   Patient said she is feeling better today. Patient said she had good sleep last night. She denies any acute issues this morning.        Assessment/Plan:   Acute on chronic hypoxic respiratory failure due to systemic vasculitis possibly involving lung as well  -- Continue  steroids IV 60 every 12--continue today taper upon discharge  -- Nebs every 12 Brovana Pulmicort added Mucinex  -- On antibiotics Levaquin  -- Patient had recent [facility] admission for the same follows at [facility] for GPA vasculitis  --Consult pulmonary--following  --  CTA was negative for PE, with patient there is a acute T5 vertebral body fracture with approximately 25% height loss  --Discussed with the patient continue vitamin D and calcium  --PT OT when able     Systemic vasculitis - managed by her rheumatologist  Patient is on methotrexate and Prolia injection  Chronically on steroids  On Rituxan every 6 month  Continue leucovorin continue calcium vitamin D3     Hypertension, controlled with current medications.    Leukocytosis with neurophilia-continue to monitor on levofloxacin.     Hyperglycemia from steroids- monitor.    Hypokalemia: Replace and monitor while on Lasix.       To Case management consult for dispo planning possibly home next week when improved home health PT O    See orders for other plans. VTE prophylaxis: Continue enoxaparin. Code status: Full code. Discussed plan of care with Patient/Family and Nurse. Pre-admission lived at home. Discharge planning: pending. Discharge to home with family in 1 or 2 more days. Review of Systems:     Review of Systems:  Symptom  Y/N  Comments   Symptom  Y/N  Comments    Fever/Chills  n    Chest Pain  n    Poor Appetite  n    Edema  n     Cough  y   Abdominal Pain  n     Sputum  y   Joint Pain      SOB/GIBSON  n   Pruritis/Rash      Nausea/vomit  n   Tolerating PT/OT      Diarrhea  n   Tolerating Diet      Constipation  n   Other      Could not obtain due to:         Objective:   Physical Exam:     Visit Vitals  /71 (BP 1 Location: Left upper arm, BP Patient Position: At rest;Semi fowlers)   Pulse 98   Temp 98.8 °F (37.1 °C)   Resp 17   Ht 5' 6\" (1.676 m)   Wt 61.7 kg (136 lb)   SpO2 93%   BMI 21.95 kg/m²    O2 Flow Rate (L/min): 4 l/min O2 Device: Nasal cannula    Temp (24hrs), Av.8 °F (37.1 °C), Min:97.9 °F (36.6 °C), Max:99.4 °F (37.4 °C)    No intake/output data recorded. No intake/output data recorded. General:   Was resting at the bedside when I visited her this morning. She was alert, cooperative, no distress, appears stated age. Lungs:   Clear to auscultation bilaterally. Chest wall:  No tenderness or deformity. Heart:  Regular rate and rhythm, S1, S2 normal, no murmur, click, rub or gallop.    Abdomen:    Obese, soft, non-tender. Bowel sounds normal.    Extremities: Extremities normal, atraumatic, no cyanosis or edema. Neurologic:  Patient has clear voice. She follows command. Data Review:       Recent Days:  Recent Labs     06/12/22  0009 06/11/22  0438 06/10/22  0419   WBC 12.8* 11.8* 13.7*   HGB 11.3* 10.8* 10.7*   HCT 36.3 33.2* 33.8*    237 277     Recent Labs     06/12/22  0009 06/11/22  0438 06/10/22  0419    136 136   K 2.8* 3.3* 3.7   CL 97 100 103   CO2 33* 30 30   * 176* 199*   BUN 17 14 15   CREA 0.61 0.47* 0.58   CA 8.9 7.9* 8.6     No results for input(s): PH, PCO2, PO2, HCO3, FIO2 in the last 72 hours. 24 Hour Results:  Recent Results (from the past 24 hour(s))   GLUCOSE, POC    Collection Time: 06/11/22 11:41 AM   Result Value Ref Range    Glucose (POC) 122 (H) 65 - 117 mg/dL    Performed by Cleone Gosselin (CON)    GLUCOSE, POC    Collection Time: 06/11/22  3:39 PM   Result Value Ref Range    Glucose (POC) 155 (H) 65 - 117 mg/dL    Performed by Cleone Gosselin (CON)    GLUCOSE, POC    Collection Time: 06/11/22 10:36 PM   Result Value Ref Range    Glucose (POC) 148 (H) 65 - 117 mg/dL    Performed by Aminata Viera    CBC WITH AUTOMATED DIFF    Collection Time: 06/12/22 12:09 AM   Result Value Ref Range    WBC 12.8 (H) 3.6 - 11.0 K/uL    RBC 3.46 (L) 3.80 - 5.20 M/uL    HGB 11.3 (L) 11.5 - 16.0 g/dL    HCT 36.3 35.0 - 47.0 %    .9 (H) 80.0 - 99.0 FL    MCH 32.7 26.0 - 34.0 PG    MCHC 31.1 30.0 - 36.5 g/dL    RDW 17.9 (H) 11.5 - 14.5 %    PLATELET 343 650 - 638 K/uL    MPV 9.7 8.9 - 12.9 FL    NRBC 0.0 0  WBC    ABSOLUTE NRBC 0.00 0.00 - 0.01 K/uL    NEUTROPHILS 83 (H) 32 - 75 %    BAND NEUTROPHILS 3 0 - 6 %    LYMPHOCYTES 9 (L) 12 - 49 %    MONOCYTES 4 (L) 5 - 13 %    EOSINOPHILS 0 0 - 7 %    BASOPHILS 0 0 - 1 %    METAMYELOCYTES 1 (H) 0 %    IMMATURE GRANULOCYTES 0 %    ABS. NEUTROPHILS 11.0 (H) 1.8 - 8.0 K/UL    ABS. LYMPHOCYTES 1.2 0.8 - 3.5 K/UL    ABS. MONOCYTES 0.5 0.0 - 1.0 K/UL    ABS. EOSINOPHILS 0.0 0.0 - 0.4 K/UL    ABS. BASOPHILS 0.0 0.0 - 0.1 K/UL    ABS. IMM.  GRANS. 0.0 K/UL    DF MANUAL      RBC COMMENTS ANISOCYTOSIS  1+        RBC COMMENTS MACROCYTOSIS  1+       METABOLIC PANEL, BASIC    Collection Time: 06/12/22 12:09 AM   Result Value Ref Range    Sodium 136 136 - 145 mmol/L    Potassium 2.8 (L) 3.5 - 5.1 mmol/L    Chloride 97 97 - 108 mmol/L    CO2 33 (H) 21 - 32 mmol/L    Anion gap 6 5 - 15 mmol/L    Glucose 136 (H) 65 - 100 mg/dL    BUN 17 6 - 20 MG/DL    Creatinine 0.61 0.55 - 1.02 MG/DL    BUN/Creatinine ratio 28 (H) 12 - 20      GFR est AA >60 >60 ml/min/1.73m2    GFR est non-AA >60 >60 ml/min/1.73m2    Calcium 8.9 8.5 - 10.1 MG/DL   GLUCOSE, POC    Collection Time: 06/12/22  7:58 AM   Result Value Ref Range    Glucose (POC) 162 (H) 65 - 117 mg/dL    Performed by [employer]        Problem List:  Problem List as of 6/12/2022 Date Reviewed: 9/28/2021          Codes Class Noted - Resolved    * (Principal) [facility] ICD-10-CM: J96.90  ICD-9-CM: 518.81  6/8/2022 - Present        Hypoxemia ICD-10-CM: R09.02  ICD-9-CM: 799.02  5/20/2022 - Present        Fracture of base of fifth metatarsal bone at metaphyseal-diaphyseal junction ICD-10-CM: M78.691H  ICD-9-CM: 825.25  2/25/2021 - Present        Nonrheumatic aortic valve insufficiency ICD-10-CM: I35.1  ICD-9-CM: 424.1  2/12/2018 - Present        Coronary artery disease involving native coronary artery of native heart without angina pectoris ICD-10-CM: I25.10  ICD-9-CM: 414.01  2/12/2018 - Present        Dyslipidemia ICD-10-CM: E78.5  ICD-9-CM: 272.4  2/12/2018 - Present        PVC (premature ventricular contraction) ICD-10-CM: I49.3  ICD-9-CM: 427.69  2/12/2018 - Present        Hypokalemia ICD-10-CM: E87.6  ICD-9-CM: 276.8  10/2/2017 - Present        RA (rheumatoid arthritis) (HCC) ICD-10-CM: M06.9  ICD-9-CM: 714.0  5/19/2015 - Present        HTN (hypertension) ICD-10-CM: I10  ICD-9-CM: 401.9  5/19/2015 - Present        Systolic ejection murmur APQ-06-YF: R01.1  ICD-9-CM: 785.2  5/19/2015 - Present        Dizziness ICD-10-CM: R42  ICD-9-CM: 780.4  5/19/2015 - Present              Medications reviewed  Current Facility-Administered Medications   Medication Dose Route Frequency    acetylcysteine (MUCOMYST) 200 mg/mL (20 %) solution 200 mg  200 mg Nebulization BID RT    guaiFENesin ER (MUCINEX) tablet 600 mg  600 mg Oral Q12H    pantoprazole (PROTONIX) tablet 40 mg  40 mg Oral ACB    cholecalciferol (VITAMIN D3) (1000 Units /25 mcg) tablet 2,000 Units  2,000 Units Oral DAILY    cyanocobalamin (VITAMIN B12) tablet 5,000 mcg  5,000 mcg Oral DAILY    leucovorin calcium (WELLCOVORIN) tablet 5 mg  5 mg Oral Q7D    pravastatin (PRAVACHOL) tablet 20 mg  20 mg Oral QHS    calcium carbonate (OS-MAGDI) tablet 500 mg [elemental]  500 mg Oral TID WITH MEALS    nystatin (MYCOSTATIN) 100,000 unit/mL oral suspension 500,000 Units  500,000 Units Swish and Spit QID    albuterol-ipratropium (DUO-NEB) 2.5 MG-0.5 MG/3 ML  3 mL Nebulization TID RT    zolpidem (AMBIEN) tablet 5 mg  5 mg Oral QHS PRN    docusate sodium (COLACE) capsule 100 mg  100 mg Oral DAILY    sodium chloride (NS) flush 5-40 mL  5-40 mL IntraVENous Q8H    sodium chloride (NS) flush 5-40 mL  5-40 mL IntraVENous PRN    acetaminophen (TYLENOL) tablet 650 mg  650 mg Oral Q6H PRN    Or    acetaminophen (TYLENOL) suppository 650 mg  650 mg Rectal Q6H PRN    polyethylene glycol (MIRALAX) packet 17 g  17 g Oral DAILY PRN    ondansetron (ZOFRAN ODT) tablet 4 mg  4 mg Oral Q8H PRN    Or    ondansetron (ZOFRAN) injection 4 mg  4 mg IntraVENous Q6H PRN    enoxaparin (LOVENOX) injection 40 mg  40 mg SubCUTAneous DAILY    amLODIPine (NORVASC) tablet 5 mg  5 mg Oral QHS    aspirin delayed-release tablet 81 mg  81 mg Oral DAILY    dapsone tablet 100 mg  100 mg Oral DAILY    furosemide (LASIX) tablet 40 mg  40 mg Oral Q MON, WED & FRI    levothyroxine (SYNTHROID) tablet 100 mcg  100 mcg Oral ACB    methotrexate (RHEUMATREX) tablet 20 mg  20 mg Oral every Friday    methylPREDNISolone (PF) (Solu-MEDROL) injection 60 mg  60 mg IntraVENous Q12H    insulin lispro (HUMALOG) injection   SubCUTAneous AC&HS    glucose chewable tablet 16 g  4 Tablet Oral PRN    glucagon (GLUCAGEN) injection 1 mg  1 mg IntraMUSCular PRN    dextrose 10 % infusion 0-250 mL  0-250 mL IntraVENous PRN    levoFLOXacin (LEVAQUIN) 750 mg in D5W IVPB  750 mg IntraVENous Q24H    budesonide (PULMICORT) 500 mcg/2 ml nebulizer suspension  500 mcg Nebulization BID RT    spironolactone (ALDACTONE) tablet 25 mg  25 mg Oral DAILY    dextromethorphan (DELSYM) 30 mg/5 mL syrup 30 mg  30 mg Oral Q12H PRN       Care Plan discussed with: Patient/Family and Nurse    Total time spent with patient: 40 minutes.     Soren Sebastian MD
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
## 2022-06-13 ENCOUNTER — APPOINTMENT (OUTPATIENT)
Dept: GENERAL RADIOLOGY | Age: 67
DRG: 299 | End: 2022-06-13
Attending: PHYSICIAN ASSISTANT
Payer: MEDICARE

## 2022-06-13 VITALS
HEART RATE: 108 BPM | DIASTOLIC BLOOD PRESSURE: 74 MMHG | SYSTOLIC BLOOD PRESSURE: 168 MMHG | TEMPERATURE: 99 F | OXYGEN SATURATION: 97 % | HEIGHT: 66 IN | BODY MASS INDEX: 21.83 KG/M2 | RESPIRATION RATE: 18 BRPM | WEIGHT: 135.8 LBS

## 2022-06-13 PROBLEM — J96.90 RESPIRATORY FAILURE (HCC): Status: RESOLVED | Noted: 2022-06-08 | Resolved: 2022-06-13

## 2022-06-13 LAB
ALBUMIN SERPL-MCNC: 3.5 G/DL (ref 3.5–5)
ALBUMIN/GLOB SERPL: 1.4 {RATIO} (ref 1.1–2.2)
ALP SERPL-CCNC: 60 U/L (ref 45–117)
ALT SERPL-CCNC: 67 U/L (ref 12–78)
ANION GAP SERPL CALC-SCNC: 5 MMOL/L (ref 5–15)
AST SERPL-CCNC: 28 U/L (ref 15–37)
BACTERIA SPEC CULT: NORMAL
BASOPHILS # BLD: 0 K/UL (ref 0–0.1)
BASOPHILS NFR BLD: 0 % (ref 0–1)
BILIRUB SERPL-MCNC: 1.4 MG/DL (ref 0.2–1)
BUN SERPL-MCNC: 16 MG/DL (ref 6–20)
BUN/CREAT SERPL: 28 (ref 12–20)
CALCIUM SERPL-MCNC: 10.1 MG/DL (ref 8.5–10.1)
CHLORIDE SERPL-SCNC: 99 MMOL/L (ref 97–108)
CO2 SERPL-SCNC: 33 MMOL/L (ref 21–32)
CREAT SERPL-MCNC: 0.57 MG/DL (ref 0.55–1.02)
DIFFERENTIAL METHOD BLD: ABNORMAL
EOSINOPHIL # BLD: 0 K/UL (ref 0–0.4)
EOSINOPHIL NFR BLD: 0 % (ref 0–7)
ERYTHROCYTE [DISTWIDTH] IN BLOOD BY AUTOMATED COUNT: 17.2 % (ref 11.5–14.5)
GLOBULIN SER CALC-MCNC: 2.5 G/DL (ref 2–4)
GLUCOSE BLD STRIP.AUTO-MCNC: 131 MG/DL (ref 65–117)
GLUCOSE BLD STRIP.AUTO-MCNC: 169 MG/DL (ref 65–117)
GLUCOSE SERPL-MCNC: 122 MG/DL (ref 65–100)
HCT VFR BLD AUTO: 34 % (ref 35–47)
HGB BLD-MCNC: 11.2 G/DL (ref 11.5–16)
IMM GRANULOCYTES # BLD AUTO: 0 K/UL
IMM GRANULOCYTES NFR BLD AUTO: 0 %
LYMPHOCYTES # BLD: 0 K/UL (ref 0.8–3.5)
LYMPHOCYTES NFR BLD: 0 % (ref 12–49)
MCH RBC QN AUTO: 33.5 PG (ref 26–34)
MCHC RBC AUTO-ENTMCNC: 32.9 G/DL (ref 30–36.5)
MCV RBC AUTO: 101.8 FL (ref 80–99)
METAMYELOCYTES NFR BLD MANUAL: 8 %
MONOCYTES # BLD: 0 K/UL (ref 0–1)
MONOCYTES NFR BLD: 0 % (ref 5–13)
NEUTS BAND NFR BLD MANUAL: 4 % (ref 0–6)
NEUTS SEG # BLD: 12.1 K/UL (ref 1.8–8)
NEUTS SEG NFR BLD: 88 % (ref 32–75)
NRBC # BLD: 0.02 K/UL (ref 0–0.01)
NRBC BLD-RTO: 0.2 PER 100 WBC
PLATELET # BLD AUTO: 289 K/UL (ref 150–400)
PMV BLD AUTO: 9.8 FL (ref 8.9–12.9)
POTASSIUM SERPL-SCNC: 4.2 MMOL/L (ref 3.5–5.1)
PROT SERPL-MCNC: 6 G/DL (ref 6.4–8.2)
RBC # BLD AUTO: 3.34 M/UL (ref 3.8–5.2)
RBC MORPH BLD: ABNORMAL
RBC MORPH BLD: ABNORMAL
SERVICE CMNT-IMP: ABNORMAL
SERVICE CMNT-IMP: ABNORMAL
SERVICE CMNT-IMP: NORMAL
SODIUM SERPL-SCNC: 137 MMOL/L (ref 136–145)
TOTAL CELLS COUNTED SPEC: 50
WBC # BLD AUTO: 13.1 K/UL (ref 3.6–11)

## 2022-06-13 PROCEDURE — 82962 GLUCOSE BLOOD TEST: CPT

## 2022-06-13 PROCEDURE — 74011250637 HC RX REV CODE- 250/637: Performed by: PHYSICIAN ASSISTANT

## 2022-06-13 PROCEDURE — 74011636637 HC RX REV CODE- 636/637: Performed by: HOSPITALIST

## 2022-06-13 PROCEDURE — 74011250637 HC RX REV CODE- 250/637: Performed by: HOSPITALIST

## 2022-06-13 PROCEDURE — 74011000250 HC RX REV CODE- 250: Performed by: FAMILY MEDICINE

## 2022-06-13 PROCEDURE — 85025 COMPLETE CBC W/AUTO DIFF WBC: CPT

## 2022-06-13 PROCEDURE — 74011000250 HC RX REV CODE- 250: Performed by: HOSPITALIST

## 2022-06-13 PROCEDURE — 80053 COMPREHEN METABOLIC PANEL: CPT

## 2022-06-13 PROCEDURE — 71046 X-RAY EXAM CHEST 2 VIEWS: CPT

## 2022-06-13 PROCEDURE — 74011250636 HC RX REV CODE- 250/636: Performed by: HOSPITALIST

## 2022-06-13 PROCEDURE — 36415 COLL VENOUS BLD VENIPUNCTURE: CPT

## 2022-06-13 PROCEDURE — 74011250637 HC RX REV CODE- 250/637: Performed by: FAMILY MEDICINE

## 2022-06-13 PROCEDURE — 74011250637 HC RX REV CODE- 250/637: Performed by: NURSE PRACTITIONER

## 2022-06-13 PROCEDURE — 94640 AIRWAY INHALATION TREATMENT: CPT

## 2022-06-13 RX ORDER — IPRATROPIUM BROMIDE AND ALBUTEROL SULFATE 2.5; .5 MG/3ML; MG/3ML
3 SOLUTION RESPIRATORY (INHALATION) 2 TIMES DAILY
Status: DISCONTINUED | OUTPATIENT
Start: 2022-06-13 | End: 2022-06-13 | Stop reason: HOSPADM

## 2022-06-13 RX ORDER — PANTOPRAZOLE SODIUM 40 MG/1
40 TABLET, DELAYED RELEASE ORAL DAILY
Qty: 30 TABLET | Refills: 0 | Status: SHIPPED | OUTPATIENT
Start: 2022-06-13 | End: 2022-07-13

## 2022-06-13 RX ORDER — PREDNISONE 10 MG/1
TABLET ORAL
Qty: 63 TABLET | Refills: 0 | Status: SHIPPED | OUTPATIENT
Start: 2022-06-13

## 2022-06-13 RX ORDER — LEVOFLOXACIN 750 MG/1
750 TABLET ORAL DAILY
Qty: 5 TABLET | Refills: 0 | Status: SHIPPED | OUTPATIENT
Start: 2022-06-13 | End: 2022-06-18

## 2022-06-13 RX ADMIN — LEVOTHYROXINE SODIUM 100 MCG: 0.1 TABLET ORAL at 07:13

## 2022-06-13 RX ADMIN — CALCIUM 500 MG: 500 TABLET ORAL at 13:06

## 2022-06-13 RX ADMIN — NYSTATIN 500000 UNITS: 100000 SUSPENSION ORAL at 13:06

## 2022-06-13 RX ADMIN — DAPSONE 100 MG: 100 TABLET ORAL at 09:57

## 2022-06-13 RX ADMIN — ACETYLCYSTEINE 200 MG: 200 SOLUTION ORAL; RESPIRATORY (INHALATION) at 09:38

## 2022-06-13 RX ADMIN — GUAIFENESIN 600 MG: 600 TABLET, EXTENDED RELEASE ORAL at 09:57

## 2022-06-13 RX ADMIN — SPIRONOLACTONE 25 MG: 25 TABLET ORAL at 09:57

## 2022-06-13 RX ADMIN — SODIUM CHLORIDE, PRESERVATIVE FREE 10 ML: 5 INJECTION INTRAVENOUS at 07:13

## 2022-06-13 RX ADMIN — NYSTATIN 500000 UNITS: 100000 SUSPENSION ORAL at 10:06

## 2022-06-13 RX ADMIN — Medication 2000 UNITS: at 09:57

## 2022-06-13 RX ADMIN — ENOXAPARIN SODIUM 40 MG: 100 INJECTION SUBCUTANEOUS at 10:06

## 2022-06-13 RX ADMIN — Medication 2 UNITS: at 07:30

## 2022-06-13 RX ADMIN — POTASSIUM CHLORIDE 40 MEQ: 750 TABLET, FILM COATED, EXTENDED RELEASE ORAL at 09:57

## 2022-06-13 RX ADMIN — ASPIRIN 81 MG: 81 TABLET, COATED ORAL at 09:58

## 2022-06-13 RX ADMIN — CYANOCOBALAMIN TAB 500 MCG 5000 MCG: 500 TAB at 09:56

## 2022-06-13 RX ADMIN — FUROSEMIDE 40 MG: 40 TABLET ORAL at 09:58

## 2022-06-13 RX ADMIN — IPRATROPIUM BROMIDE AND ALBUTEROL SULFATE 3 ML: .5; 3 SOLUTION RESPIRATORY (INHALATION) at 09:38

## 2022-06-13 RX ADMIN — CALCIUM 500 MG: 500 TABLET ORAL at 10:06

## 2022-06-13 RX ADMIN — BUDESONIDE 500 MCG: 0.5 INHALANT RESPIRATORY (INHALATION) at 09:38

## 2022-06-13 RX ADMIN — METHYLPREDNISOLONE SODIUM SUCCINATE 60 MG: 125 INJECTION, POWDER, FOR SOLUTION INTRAMUSCULAR; INTRAVENOUS at 09:56

## 2022-06-13 RX ADMIN — PANTOPRAZOLE SODIUM 40 MG: 40 TABLET, DELAYED RELEASE ORAL at 07:13

## 2022-06-13 NOTE — DISCHARGE SUMMARY
Discharge Summary       PATIENT ID: Reji Jenkins  MRN: [id number]   YOB: 1955    DATE OF ADMISSION: 6/8/2022  1:30 PM    DATE OF DISCHARGE: 6/13/22   PRIMARY CARE PROVIDER: Roldan Shearer MD     ATTENDING PHYSICIAN: Alize Arellano  DISCHARGING PROVIDER: Erna Gustafson MD    To contact this individual call [phone] and ask the  to page. If unavailable ask to be transferred the Adult Hospitalist Department.     CONSULTATIONS: IP CONSULT TO HOSPITALIST  IP CONSULT TO PULMONOLOGY    PROCEDURES/SURGERIES: * No surgery found *    DISCHARGE DIAGNOSES: Acute on chronic respiratory failure due to systemic vasculitis    Zahraa Charles a 78 y.o. female who presents with  Sob.  Patient was recently admitted and discharged 2 weeks back from [address] [place] [address] Way was discharged on home oxygen was on 2 L at home and taking medications regularly this morning patient said that she suddenly become so short of breath she was unable to breathe and decided to come to the hospital.  Patient was desaturating to 80s and put back on 6 L at in the ED. patient has past medical history of GPA vasculitis rheumatoid arthritis and managed by Dr. Chichi Esteves at [address] [facility] also history of Bell's palsy aortic valve insufficiency hypertension rheumatoid arthritis patient chronically on steroids.  Patient has been vaccinated for COVID-19 denies any fever cough shortness of breath or chest pain admitted for further management    [address]:     Acute on chronic hypoxic respiratory failure due to systemic vasculitis possibly involving lung as well  -- Patient was treated with Levaquin discharged on 5 more days with Daniela-Q also patient is on oxygen chronically patient was discharged on tapered steroids and to continue 20 mg daily on home dose      Systemic vasculitis - managed by her rheumatologist  Patient is on methotrexate and Prolia injection  Patient will be following next Friday     Hypertension continue home medication     Leukocytosis with neurophilia-trending down with antibiotics     hyperglycemia from steroids- monitor      Hypokalemia replete IV    DISCHARGE DIAGNOSES / PLAN:      Discharge home    BMI: Body mass index is 21.92 kg/m². . This patient: Has a BMI within normal limits. PENDING TEST RESULTS:   At the time of discharge the following test results are still pending:      ADDITIONAL CARE RECOMMENDATIONS:        NOTIFY YOUR PHYSICIAN FOR ANY OF THE FOLLOWING:   Fever over 101 degrees for 24 hours. Chest pain, shortness of breath, fever, chills, nausea, vomiting, diarrhea, change in mentation, falling, weakness, bleeding. Severe pain or pain not relieved by medications, as well as any other signs or symptoms that you may have questions about. FOLLOW UP APPOINTMENTS:    Follow-up Information     Follow up With Specialties Details Why [address] [place] [address] will start services on Monday 6/13/22 [address]  [facility] [address] [facility]    Lilian Suggs MD Family Medicine In 1 week  [address] [place] [id number] [phone]               DIET: Cardiac Diet    ACTIVITY: Activity as tolerated    EQUIPMENT needed:     DISCHARGE MEDICATIONS:  Current Discharge Medication List      START taking these medications    Details   levoFLOXacin (Levaquin) 750 mg tablet Take 1 Tablet by mouth daily for 5 days. Qty: 5 Tablet, Refills: 0  Start date: 6/13/2022, End date: 6/18/2022      L.acidoph & parac-S.therm-Bifido (EWA Q2/RISAQUAD-2) 16 billion cell cap cap Take 1 Capsule by mouth daily for 30 days. Qty: 30 Capsule, Refills: 0  Start date: 6/13/2022, End date: 7/13/2022      !!  predniSONE (DELTASONE) 10 mg tablet 6 tabs daily for 3 days then drop to 5 tabs daily for 3 days then drop to 4 tabs daily for 3 days then drop to 3 tabs daily for 3 days then drop to 2 tabs daily and cont 20 mg daily as your home dose  Qty: 63 Tablet, Refills: 0  Start date: 6/13/2022      pantoprazole (PROTONIX) 40 mg tablet Take 1 Tablet by mouth daily for 30 days. Qty: 30 Tablet, Refills: 0  Start date: 6/13/2022, End date: 7/13/2022       !! - Potential duplicate medications found. Please discuss with provider. CONTINUE these medications which have NOT CHANGED    Details   methotrexate (RHEUMATREX) 2.5 mg tablet Take 20 mg by mouth Every Friday. Associated Diagnoses: PVC (premature ventricular contraction)      ! ! predniSONE (DELTASONE) 20 mg tablet Take 2 Tablets by mouth daily (with breakfast). Qty: 60 Tablet, Refills: 0      spironolactone (ALDACTONE) 25 mg tablet Take 1 Tablet by mouth daily. Qty: 30 Tablet, Refills: 2      furosemide (LASIX) 40 mg tablet Take 1 Tablet by mouth every Monday, Wednesday, Friday for 30 days. Qty: 12 Tablet, Refills: 2      levothyroxine (SYNTHROID) 100 mcg tablet Take 100 mcg by mouth Daily (before breakfast). dapsone 100 mg tablet Take 100 mg by mouth daily. denosumab (Prolia) 60 mg/mL injection 60 mg by SubCUTAneous route every 6 months. cyanocobalamin 1,000 mcg tablet Take 5,000 mcg by mouth daily. pravastatin (PRAVACHOL) 20 mg tablet Take 20 mg by mouth nightly. potassium chloride SR (KLOR-CON 10) 10 mEq tablet TAKE 2 TABLETS TWICE A DAY  Qty: 360 Tab, Refills: 3      KRILL OIL PO Take 350 mg by mouth every morning. aspirin delayed-release 81 mg tablet Take  by mouth daily. leucovorin calcium (WELLCOVORIN) 5 mg tablet Take  by mouth every seven (7) days. Saturday    Associated Diagnoses: PVC (premature ventricular contraction)      amLODIPine (NORVASC) 5 mg tablet Take 5 mg by mouth nightly. Associated Diagnoses: PVC (premature ventricular contraction)      zolpidem (AMBIEN) 10 mg tablet Take 3.33 mg by mouth nightly as needed for Sleep (Splits tablet into thirds).     Associated Diagnoses: PVC (premature ventricular contraction)      cholecalciferol, vitamin D3, (VITAMIN D3) 2,000 unit tab Take 1 Tablet by mouth daily. Associated Diagnoses: PVC (premature ventricular contraction)       ! ! - Potential duplicate medications found. Please discuss with provider. STOP taking these medications       azithromycin (ZITHROMAX) 250 mg tablet Comments:   Reason for Stopping:         famotidine (PEPCID) 20 mg tablet Comments:   Reason for Stopping:               DISPOSITION:  x Home With:   OT x PT x HH  RN       Long term SNF/Inpatient Rehab    Independent/assisted living    Hospice    Other:       PATIENT CONDITION AT DISCHARGE:     Functional status    Poor    x Deconditioned     Independent      Cognition   x  Lucid     Forgetful     Dementia      Catheters/lines (plus indication)    Moise     PICC     PEG    x None      Code status    x Full code     DNR      PHYSICAL EXAMINATION AT DISCHARGE:  General:          Alert, cooperative, no distress, appears stated age. HEENT:           Atraumatic, anicteric sclerae, pink conjunctivae                          No oral ulcers, mucosa moist, throat clear, dentition fair  Neck:               Supple, symmetrical  Lungs:             Clear to auscultation bilaterally. No Wheezing or Rhonchi. No rales. Chest wall:      No tenderness  No Accessory muscle use. Heart:              Regular  rhythm,  No  murmur   No edema  Abdomen:        Soft, non-tender. Not distended. Bowel sounds normal  Extremities:     No cyanosis. No clubbing,                            Skin turgor normal, Capillary refill normal  Skin:                Not pale. Not Jaundiced  No rashes   Psych:             Not anxious or agitated.   Neurologic:      Alert, moves all extremities, answers questions appropriately and responds to commands       CHRONIC MEDICAL DIAGNOSES:  Problem List as of 6/13/2022 Date Reviewed: 9/28/2021          Codes Class Noted - Resolved    Hypoxemia ICD-10-CM: R09.02  ICD-9-CM: 799.02  5/20/2022 - Present        Fracture of base of fifth metatarsal bone at metaphyseal-diaphyseal junction ICD-10-CM: S99.199A  ICD-9-CM: 825.25  2/25/2021 - Present        Nonrheumatic aortic valve insufficiency ICD-10-CM: I35.1  ICD-9-CM: 424.1  2/12/2018 - Present        Coronary artery disease involving native coronary artery of native heart without angina pectoris ICD-10-CM: I25.10  ICD-9-CM: 414.01  2/12/2018 - Present        Dyslipidemia ICD-10-CM: E78.5  ICD-9-CM: 272.4  2/12/2018 - Present        PVC (premature ventricular contraction) ICD-10-CM: I49.3  ICD-9-CM: 427.69  2/12/2018 - Present        Hypokalemia ICD-10-CM: E87.6  ICD-9-CM: 276.8  10/2/2017 - Present        RA (rheumatoid arthritis) ([facility] ICD-10-CM: M06.9  ICD-9-CM: 714.0  5/19/2015 - Present        HTN (hypertension) ICD-10-CM: I10  ICD-9-CM: 401.9  5/19/2015 - Present        Systolic ejection murmur RAA-88-TL: R01.1  ICD-9-CM: 785.2  5/19/2015 - Present        Dizziness ICD-10-CM: R42  ICD-9-CM: 780.4  5/19/2015 - Present        * (Principal) RESOLVED: Respiratory failure ([facility] ICD-10-CM: J96.90  ICD-9-CM: 518.81  6/8/2022 - 6/13/2022              Greater than 30  minutes were spent with the patient on counseling and coordination of care    Signed:   Gerald Peña MD  6/13/2022  1:13 PM
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
## 2022-06-13 NOTE — PROGRESS NOTES
PHIL:  RUR: 17%    Disposition: Home with Home Health PT ([address]). Discharge plans reviewed with patient. She is in agreement with plans. Pairnt is awaiting spouse for d/c transportation. Medicare pt has received, reviewed, and signed 2nd IM letter informing them of their right to appeal the discharge. Signed copied has been placed on pt bedside chart.     Nelson Velasquez, [address], [address]

## 2022-06-13 NOTE — ROUTINE PROCESS
Patient given discharge education. This included medication and medication changes, where to  medications, follow up appointments, signs and symptoms of stroke and heart attack and told to return to ED for any of these symptoms or increasing in current symptoms. Patient said would provide her own home O2 and  would bring it to . IV removed without bleeding.

## 2022-06-13 NOTE — PROGRESS NOTES
Bedside shift change report given to Roderick Hammans, RN (oncoming nurse) by Kong Brady RN (offgoing nurse).  Report included the following information SBAR, Kardex, ED Summary, Intake/Output, MAR, Accordion, Recent Results, Med Rec Status and Cardiac Rhythm NSR-ST.

## 2022-06-13 NOTE — DISCHARGE INSTRUCTIONS
Discharge Instructions       PATIENT ID: Elvis Hsu  MRN: [id number]   YOB: 1955    DATE OF ADMISSION: 6/8/2022  1:30 PM    DATE OF DISCHARGE: 6/13/2022    PRIMARY CARE PROVIDER: Francesca Grissom MD     ATTENDING PHYSICIAN: Brant Guardado MD  DISCHARGING PROVIDER: Jyoti Baldwin MD    To contact this individual call [phone] and ask the  to page. If unavailable ask to be transferred the Adult Hospitalist Department. DISCHARGE DIAGNOSES Acute on chronic hypoxic respiratory failure due to systemic vasculitis     CONSULTATIONS: IP CONSULT TO HOSPITALIST  IP CONSULT TO PULMONOLOGY    PROCEDURES/SURGERIES: * No surgery found *    PENDING TEST RESULTS:   At the time of discharge the following test results are still pending:     FOLLOW UP APPOINTMENTS:   Follow-up Information     Follow up With Specialties Details Why [facility] will start services on Monday 6/13/22 [address]  [facility]. [address] [facility]    Francesca Grissom MD Family Medicine In 1 week  [address] [place] [address] [phone]             ADDITIONAL CARE RECOMMENDATIONS: Follow-up with your hematologist as scheduled on Friday    DIET: Cardiac Diet and Diabetic Diet      ACTIVITY: Activity as tolerated    WOUND CARE:     EQUIPMENT needed:       Radiology      DISCHARGE MEDICATIONS:   See Medication Reconciliation Form    · It is important that you take the medication exactly as they are prescribed. · Keep your medication in the bottles provided by the pharmacist and keep a list of the medication names, dosages, and times to be taken in your wallet. · Do not take other medications without consulting your doctor. NOTIFY YOUR PHYSICIAN FOR ANY OF THE FOLLOWING:   Fever over 101 degrees for 24 hours.    Chest pain, shortness of breath, fever, chills, nausea, vomiting, diarrhea, change in mentation, falling, weakness, bleeding. Severe pain or pain not relieved by medications. Or, any other signs or symptoms that you may have questions about.       DISPOSITION:  x  Home With:   OT  PT  HH  RN       SNF/Inpatient Rehab/LTAC    Independent/assisted living    Hospice    Other:     CDMP Checked:   Yes x     PROBLEM LIST Updated:  Yes x       Signed:   Morelia Ge MD  6/13/2022  1:12 PM

## 2022-06-13 NOTE — PROGRESS NOTES
Bedside and Verbal shift change report given to Brett Severino (oncoming nurse) by Mraiana Nevarez (offgoing nurse).  Report included the following information SBAR, Kardex, MAR, Accordion and Cardiac Rhythm SR.

## 2022-07-20 ENCOUNTER — TRANSCRIBE ORDER (OUTPATIENT)
Dept: SCHEDULING | Age: 67
End: 2022-07-20

## 2022-07-20 DIAGNOSIS — J96.11 CHRONIC RESPIRATORY FAILURE WITH HYPOXIA (HCC): ICD-10-CM

## 2022-07-20 DIAGNOSIS — R91.1 COIN LESION: Primary | ICD-10-CM

## 2022-07-22 ENCOUNTER — TRANSCRIBE ORDER (OUTPATIENT)
Dept: SCHEDULING | Age: 67
End: 2022-07-22

## 2022-07-22 DIAGNOSIS — J96.11 CHRONIC RESPIRATORY FAILURE WITH HYPOXIA (HCC): Primary | ICD-10-CM

## 2022-07-29 ENCOUNTER — TRANSCRIBE ORDER (OUTPATIENT)
Dept: SCHEDULING | Age: 67
End: 2022-07-29

## 2022-07-29 DIAGNOSIS — R06.02 SHORTNESS OF BREATH: Primary | ICD-10-CM

## 2022-08-09 ENCOUNTER — HOSPITAL ENCOUNTER (OUTPATIENT)
Dept: CT IMAGING | Age: 67
Discharge: HOME | End: 2022-08-09
Attending: INTERNAL MEDICINE
Payer: MEDICARE

## 2022-08-09 DIAGNOSIS — J96.11 CHRONIC RESPIRATORY FAILURE WITH HYPOXIA (HCC): ICD-10-CM

## 2022-08-09 DIAGNOSIS — R91.1 COIN LESION: ICD-10-CM

## 2022-08-09 PROCEDURE — 71260 CT THORAX DX C+: CPT

## 2022-08-09 PROCEDURE — 74011000636 HC RX REV CODE- 636

## 2022-08-09 RX ADMIN — IOPAMIDOL 100 ML: 612 INJECTION, SOLUTION INTRAVENOUS at 13:51

## 2022-08-21 ENCOUNTER — HOSPITAL ENCOUNTER (OUTPATIENT)
Dept: GENERAL RADIOLOGY | Age: 67
Discharge: HOME | End: 2022-08-21
Payer: MEDICARE

## 2022-08-21 ENCOUNTER — TRANSCRIBE ORDER (OUTPATIENT)
Dept: EMERGENCY DEPT | Age: 67
End: 2022-08-21

## 2022-08-21 DIAGNOSIS — J84.9 INTERSTITIAL LUNG DISEASE (HCC): ICD-10-CM

## 2022-08-21 DIAGNOSIS — J96.11 CHRONIC RESPIRATORY FAILURE WITH HYPOXIA (HCC): ICD-10-CM

## 2022-08-21 DIAGNOSIS — J84.9 INTERSTITIAL LUNG DISEASE (HCC): Primary | ICD-10-CM

## 2022-08-21 PROCEDURE — 71046 X-RAY EXAM CHEST 2 VIEWS: CPT

## 2022-08-22 ENCOUNTER — HOSPITAL ENCOUNTER (OUTPATIENT)
Dept: NUCLEAR MEDICINE | Age: 67
Discharge: HOME | End: 2022-08-22
Attending: INTERNAL MEDICINE
Payer: MEDICARE

## 2022-08-22 ENCOUNTER — HOSPITAL ENCOUNTER (OUTPATIENT)
Dept: NON INVASIVE DIAGNOSTICS | Age: 67
Discharge: HOME | End: 2022-08-22
Attending: INTERNAL MEDICINE
Payer: MEDICARE

## 2022-08-22 VITALS
WEIGHT: 135 LBS | DIASTOLIC BLOOD PRESSURE: 70 MMHG | HEIGHT: 66 IN | SYSTOLIC BLOOD PRESSURE: 148 MMHG | BODY MASS INDEX: 21.69 KG/M2

## 2022-08-22 DIAGNOSIS — R06.02 SHORTNESS OF BREATH: ICD-10-CM

## 2022-08-22 DIAGNOSIS — J96.11 CHRONIC RESPIRATORY FAILURE WITH HYPOXIA (HCC): ICD-10-CM

## 2022-08-22 PROCEDURE — 96374 THER/PROPH/DIAG INJ IV PUSH: CPT

## 2022-08-22 PROCEDURE — 78580 LUNG PERFUSION IMAGING: CPT

## 2022-08-25 LAB
ECHO AO ROOT DIAM: 2.8 CM
ECHO AO ROOT INDEX: 1.66 CM/M2
ECHO AV AREA PEAK VELOCITY: 1.7 CM2
ECHO AV AREA PEAK VELOCITY: 1.9 CM2
ECHO AV AREA VTI: 2 CM2
ECHO AV AREA/BSA VTI: 1.2 CM2/M2
ECHO AV MEAN GRADIENT: 14 MMHG
ECHO AV MEAN VELOCITY: 1.8 M/S
ECHO AV PEAK GRADIENT: 21 MMHG
ECHO AV PEAK GRADIENT: 28 MMHG
ECHO AV PEAK VELOCITY: 2.3 M/S
ECHO AV PEAK VELOCITY: 2.6 M/S
ECHO AV VTI: 46.1 CM
ECHO LA DIAMETER INDEX: 2.37 CM/M2
ECHO LA DIAMETER: 4 CM
ECHO LA TO AORTIC ROOT RATIO: 1.43
ECHO LV E' LATERAL VELOCITY: 10 CM/S
ECHO LV E' SEPTAL VELOCITY: 11 CM/S
ECHO LV FRACTIONAL SHORTENING: 39 % (ref 28–44)
ECHO LV INTERNAL DIMENSION DIASTOLE INDEX: 2.43 CM/M2
ECHO LV INTERNAL DIMENSION DIASTOLIC: 4.1 CM (ref 3.9–5.3)
ECHO LV INTERNAL DIMENSION SYSTOLIC INDEX: 1.48 CM/M2
ECHO LV INTERNAL DIMENSION SYSTOLIC: 2.5 CM
ECHO LV IVSD: 1.1 CM (ref 0.6–0.9)
ECHO LV MASS 2D: 132.1 G (ref 67–162)
ECHO LV MASS INDEX 2D: 78.2 G/M2 (ref 43–95)
ECHO LV POSTERIOR WALL DIASTOLIC: 0.9 CM (ref 0.6–0.9)
ECHO LV RELATIVE WALL THICKNESS RATIO: 0.44
ECHO LVOT AREA: 3.1 CM2
ECHO LVOT AV VTI INDEX: 0.67
ECHO LVOT DIAM: 2 CM
ECHO LVOT MEAN GRADIENT: 4 MMHG
ECHO LVOT PEAK GRADIENT: 9 MMHG
ECHO LVOT PEAK VELOCITY: 1.5 M/S
ECHO LVOT STROKE VOLUME INDEX: 57.2 ML/M2
ECHO LVOT SV: 96.7 ML
ECHO LVOT VTI: 30.8 CM
ECHO MV A VELOCITY: 1.04 M/S
ECHO MV E DECELERATION TIME (DT): 272.7 MS
ECHO MV E VELOCITY: 0.88 M/S
ECHO MV E/A RATIO: 0.85
ECHO MV E/E' LATERAL: 8.8
ECHO MV E/E' RATIO (AVERAGED): 8.4
ECHO MV E/E' SEPTAL: 8
ECHO RV FREE WALL PEAK S': 16 CM/S
ECHO RV INTERNAL DIMENSION: 3.3 CM
ECHO RV TAPSE: 3.1 CM (ref 1.7–?)
ECHO RVOT PEAK GRADIENT: 3 MMHG
ECHO RVOT PEAK VELOCITY: 0.9 M/S
ECHO TV REGURGITANT MAX VELOCITY: 2.58 M/S
ECHO TV REGURGITANT PEAK GRADIENT: 27 MMHG

## 2022-08-25 PROCEDURE — 93306 TTE W/DOPPLER COMPLETE: CPT | Performed by: SPECIALIST

## 2023-02-27 ENCOUNTER — TRANSCRIBE ORDER (OUTPATIENT)
Dept: SCHEDULING | Age: 68
End: 2023-02-27

## 2023-02-27 DIAGNOSIS — J84.9 INTERSTITIAL LUNG DISEASE (HCC): Primary | ICD-10-CM

## 2023-03-06 ENCOUNTER — HOSPITAL ENCOUNTER (OUTPATIENT)
Dept: CT IMAGING | Age: 68
Discharge: HOME | End: 2023-03-06
Attending: INTERNAL MEDICINE
Payer: MEDICARE

## 2023-03-06 DIAGNOSIS — J84.9 INTERSTITIAL LUNG DISEASE (HCC): ICD-10-CM

## 2023-03-06 PROCEDURE — 71250 CT THORAX DX C-: CPT

## 2023-03-15 ENCOUNTER — TRANSCRIBE ORDER (OUTPATIENT)
Dept: SCHEDULING | Age: 68
End: 2023-03-15

## 2023-03-15 DIAGNOSIS — Z12.31 VISIT FOR SCREENING MAMMOGRAM: Primary | ICD-10-CM

## 2023-04-23 DIAGNOSIS — Z12.31 VISIT FOR SCREENING MAMMOGRAM: Primary | ICD-10-CM

## 2023-04-25 ENCOUNTER — HOSPITAL ENCOUNTER (OUTPATIENT)
Dept: MAMMOGRAPHY | Age: 68
Discharge: HOME | End: 2023-04-25
Attending: FAMILY MEDICINE
Payer: MEDICARE

## 2023-04-25 DIAGNOSIS — Z12.31 VISIT FOR SCREENING MAMMOGRAM: ICD-10-CM

## 2023-04-25 PROCEDURE — 77063 BREAST TOMOSYNTHESIS BI: CPT

## 2023-09-05 ENCOUNTER — HOSPITAL ENCOUNTER (OUTPATIENT)
Facility: HOSPITAL | Age: 68
Discharge: HOME | End: 2023-09-08
Attending: SPECIALIST
Payer: MEDICARE

## 2023-09-05 DIAGNOSIS — H70.12 CHRONIC MASTOIDITIS OF LEFT SIDE: ICD-10-CM

## 2023-09-05 DIAGNOSIS — H90.A32 MIXED CONDUCTIVE AND SENSORINEURAL HEARING LOSS, UNILATERAL, LEFT EAR WITH RESTRICTED HEARING ON THE CONTRALATERAL SIDE: ICD-10-CM

## 2023-09-05 DIAGNOSIS — G51.0 FACIAL PARALYSIS ON LEFT SIDE: ICD-10-CM

## 2023-09-05 DIAGNOSIS — H71.02 CHOLESTEATOMA OF ATTIC, LEFT EAR: ICD-10-CM

## 2023-09-05 PROCEDURE — 70480 CT ORBIT/EAR/FOSSA W/O DYE: CPT

## 2023-10-17 ENCOUNTER — HOSPITAL ENCOUNTER (OUTPATIENT)
Age: 68
Discharge: HOME | End: 2023-10-20
Payer: MEDICARE

## 2023-10-17 ENCOUNTER — HOSPITAL ENCOUNTER (OUTPATIENT)
Facility: HOSPITAL | Age: 68
Discharge: HOME | End: 2023-10-20
Payer: MEDICARE

## 2023-10-17 VITALS
TEMPERATURE: 98.9 F | HEART RATE: 83 BPM | OXYGEN SATURATION: 95 % | HEIGHT: 66 IN | BODY MASS INDEX: 21.53 KG/M2 | SYSTOLIC BLOOD PRESSURE: 132 MMHG | WEIGHT: 134 LBS | DIASTOLIC BLOOD PRESSURE: 67 MMHG

## 2023-10-17 LAB
ANION GAP SERPL CALC-SCNC: 5 MMOL/L (ref 5–15)
BUN SERPL-MCNC: 20 MG/DL (ref 6–20)
BUN/CREAT SERPL: 33 (ref 12–20)
CALCIUM SERPL-MCNC: 9.5 MG/DL (ref 8.5–10.1)
CHLORIDE SERPL-SCNC: 109 MMOL/L (ref 97–108)
CO2 SERPL-SCNC: 29 MMOL/L (ref 21–32)
CREAT SERPL-MCNC: 0.61 MG/DL (ref 0.55–1.02)
EKG ATRIAL RATE: 61 BPM
EKG DIAGNOSIS: NORMAL
EKG P AXIS: 50 DEGREES
EKG P-R INTERVAL: 132 MS
EKG Q-T INTERVAL: 408 MS
EKG QRS DURATION: 80 MS
EKG QTC CALCULATION (BAZETT): 410 MS
EKG R AXIS: 19 DEGREES
EKG T AXIS: 43 DEGREES
EKG VENTRICULAR RATE: 61 BPM
ERYTHROCYTE [DISTWIDTH] IN BLOOD BY AUTOMATED COUNT: 15.4 % (ref 11.5–14.5)
GLUCOSE SERPL-MCNC: 120 MG/DL (ref 65–100)
HCT VFR BLD AUTO: 42.1 % (ref 35–47)
HGB BLD-MCNC: 13.5 G/DL (ref 11.5–16)
MCH RBC QN AUTO: 29.3 PG (ref 26–34)
MCHC RBC AUTO-ENTMCNC: 32.1 G/DL (ref 30–36.5)
MCV RBC AUTO: 91.5 FL (ref 80–99)
NRBC # BLD: 0 K/UL (ref 0–0.01)
NRBC BLD-RTO: 0 PER 100 WBC
PLATELET # BLD AUTO: 244 K/UL (ref 150–400)
PMV BLD AUTO: 10.5 FL (ref 8.9–12.9)
POTASSIUM SERPL-SCNC: 4.3 MMOL/L (ref 3.5–5.1)
RBC # BLD AUTO: 4.6 M/UL (ref 3.8–5.2)
SODIUM SERPL-SCNC: 143 MMOL/L (ref 136–145)
WBC # BLD AUTO: 5.3 K/UL (ref 3.6–11)

## 2023-10-17 PROCEDURE — 85027 COMPLETE CBC AUTOMATED: CPT

## 2023-10-17 PROCEDURE — 80048 BASIC METABOLIC PNL TOTAL CA: CPT

## 2023-10-17 PROCEDURE — 71046 X-RAY EXAM CHEST 2 VIEWS: CPT

## 2023-10-17 PROCEDURE — 93005 ELECTROCARDIOGRAM TRACING: CPT | Performed by: SPECIALIST

## 2023-10-17 PROCEDURE — 36415 COLL VENOUS BLD VENIPUNCTURE: CPT

## 2023-10-17 RX ORDER — RITUXIMAB 10 MG/ML
375 INJECTION, SOLUTION INTRAVENOUS
COMMUNITY

## 2023-10-17 RX ORDER — ALBUTEROL SULFATE 0.63 MG/3ML
1 SOLUTION RESPIRATORY (INHALATION) 2 TIMES DAILY
COMMUNITY

## 2023-10-17 RX ORDER — COVID-19 ANTIGEN TEST
1 KIT MISCELLANEOUS AS NEEDED
COMMUNITY

## 2023-10-17 RX ORDER — PANTOPRAZOLE SODIUM 40 MG/1
40 TABLET, DELAYED RELEASE ORAL DAILY
COMMUNITY

## 2023-10-17 RX ORDER — ACETAMINOPHEN 325 MG/1
650 TABLET ORAL EVERY 6 HOURS PRN
COMMUNITY

## 2023-10-17 NOTE — PERIOP NOTE
PATIENT GIVEN WRITTEN INSTRUCTIONS TO GO TO THE IMAGING CENTER/CANCER CENTER [address] [place] [address] [address] TO HAVE CHEST XRAY COMPLETED. CARDIAC AND PULMONARY NOTES RECEIVED.

## 2023-10-17 NOTE — PERIOP NOTE
[address] INSTRUCTIONS    Surgery Date:   10/24/23    Your surgeon's office or [facility] staff will call you between 4 PM- 8 PM the day before surgery with your arrival time. If your surgery is on a Monday, you will receive a call the preceding Friday. If your surgeon;s office has given you arrival time, then go by that time. Please report to [facility] Patient Access/Admitting on the 1st floor. Bring your insurance card, photo identification, and any copayment ( if applicable). If you are going home the same day of your surgery, you must have a responsible adult to drive you home. You need to have a responsible adult to stay with you the first 24 hours after surgery and you should not drive a car for 24 hours following your surgery. If you are being admitted to the hospital, please leave personal belongings/luggage in your car until you have an assigned hospital room number. Nothing to eat or drink after midnight the night before surgery. This includes no water, gum, mints, coffee, juice, etc.  Please note special instructions, if applicable, below for medications. Do NOT drink alcohol or smoke 24 hours before surgery. STOP smoking for 14 days prior as it helps with breathing and healing after surgery. Please wear comfortable clothes. Wear glasses instead of contacts. We ask that all money and jewelry and valuables to be left at home. Wear no makeup, particularly mascara the day of surgery. All body piercings, rings, and jewelry need to be removed and left at home. Remove all nail polish except for clear. Please wear your hair loose or down. Please no pony-tails, buns, or any metal hair accessories. You may wear deodorant, unless having breast surgery. Do not shave any body area within 24 hours of your surgery. Please follow all instructions to avoid any potential surgical cancellation.   Should your physical condition change, (i.e. fever, cold, flu, etc.) please notify your

## 2023-10-18 LAB
BACTERIA SPEC CULT: NORMAL
BACTERIA SPEC CULT: NORMAL
SERVICE CMNT-IMP: NORMAL

## 2023-10-19 NOTE — PERIOP NOTE
SPOKE WITH DOUGLAS IN INFECTION CONTROL AND ADVISED OF NEGATIVE MRSA SWAB RESULTS. DOUGLAS TO REMOVE MRSA FLAG FROM CHART.

## 2023-10-24 ENCOUNTER — HOSPITAL ENCOUNTER (OUTPATIENT)
Facility: HOSPITAL | Age: 68
Setting detail: OUTPATIENT SURGERY
Discharge: HOME | End: 2023-10-24
Attending: SPECIALIST | Admitting: SPECIALIST
Payer: MEDICARE

## 2023-10-24 ENCOUNTER — ANESTHESIA EVENT (OUTPATIENT)
Facility: HOSPITAL | Age: 68
End: 2023-10-24
Payer: MEDICARE

## 2023-10-24 ENCOUNTER — ANESTHESIA (OUTPATIENT)
Facility: HOSPITAL | Age: 68
End: 2023-10-24
Payer: MEDICARE

## 2023-10-24 VITALS
RESPIRATION RATE: 13 BRPM | HEIGHT: 66 IN | DIASTOLIC BLOOD PRESSURE: 60 MMHG | TEMPERATURE: 97.5 F | BODY MASS INDEX: 21.69 KG/M2 | SYSTOLIC BLOOD PRESSURE: 116 MMHG | HEART RATE: 70 BPM | WEIGHT: 135 LBS | OXYGEN SATURATION: 98 %

## 2023-10-24 DIAGNOSIS — J38.6 SUBGLOTTIC STENOSIS: Primary | ICD-10-CM

## 2023-10-24 PROCEDURE — 6360000002 HC RX W HCPCS: Performed by: STUDENT IN AN ORGANIZED HEALTH CARE EDUCATION/TRAINING PROGRAM

## 2023-10-24 PROCEDURE — 6370000000 HC RX 637 (ALT 250 FOR IP): Performed by: NURSE ANESTHETIST, CERTIFIED REGISTERED

## 2023-10-24 PROCEDURE — 2500000003 HC RX 250 WO HCPCS: Performed by: STUDENT IN AN ORGANIZED HEALTH CARE EDUCATION/TRAINING PROGRAM

## 2023-10-24 PROCEDURE — 7100000000 HC PACU RECOVERY - FIRST 15 MIN: Performed by: SPECIALIST

## 2023-10-24 PROCEDURE — 6370000000 HC RX 637 (ALT 250 FOR IP): Performed by: ANESTHESIOLOGY

## 2023-10-24 PROCEDURE — 2580000003 HC RX 258: Performed by: ANESTHESIOLOGY

## 2023-10-24 PROCEDURE — 3600000014 HC SURGERY LEVEL 4 ADDTL 15MIN: Performed by: SPECIALIST

## 2023-10-24 PROCEDURE — 7100000001 HC PACU RECOVERY - ADDTL 15 MIN: Performed by: SPECIALIST

## 2023-10-24 PROCEDURE — 7100000011 HC PHASE II RECOVERY - ADDTL 15 MIN: Performed by: SPECIALIST

## 2023-10-24 PROCEDURE — 3700000000 HC ANESTHESIA ATTENDED CARE: Performed by: SPECIALIST

## 2023-10-24 PROCEDURE — 2709999900 HC NON-CHARGEABLE SUPPLY: Performed by: SPECIALIST

## 2023-10-24 PROCEDURE — 3700000001 HC ADD 15 MINUTES (ANESTHESIA): Performed by: SPECIALIST

## 2023-10-24 PROCEDURE — 2580000003 HC RX 258: Performed by: NURSE ANESTHETIST, CERTIFIED REGISTERED

## 2023-10-24 PROCEDURE — 3600000004 HC SURGERY LEVEL 4 BASE: Performed by: SPECIALIST

## 2023-10-24 PROCEDURE — 7100000010 HC PHASE II RECOVERY - FIRST 15 MIN: Performed by: SPECIALIST

## 2023-10-24 PROCEDURE — C1713 ANCHOR/SCREW BN/BN,TIS/BN: HCPCS | Performed by: SPECIALIST

## 2023-10-24 RX ORDER — SODIUM CHLORIDE 9 MG/ML
INJECTION, SOLUTION INTRAVENOUS PRN
Status: DISCONTINUED | OUTPATIENT
Start: 2023-10-24 | End: 2023-10-24 | Stop reason: HOSPADM

## 2023-10-24 RX ORDER — HYDROMORPHONE HYDROCHLORIDE 1 MG/ML
0.5 INJECTION, SOLUTION INTRAMUSCULAR; INTRAVENOUS; SUBCUTANEOUS EVERY 5 MIN PRN
Status: DISCONTINUED | OUTPATIENT
Start: 2023-10-24 | End: 2023-10-24 | Stop reason: HOSPADM

## 2023-10-24 RX ORDER — FENTANYL CITRATE 50 UG/ML
100 INJECTION, SOLUTION INTRAMUSCULAR; INTRAVENOUS
Status: DISCONTINUED | OUTPATIENT
Start: 2023-10-24 | End: 2023-10-24 | Stop reason: HOSPADM

## 2023-10-24 RX ORDER — MIDAZOLAM HYDROCHLORIDE 2 MG/2ML
2 INJECTION, SOLUTION INTRAMUSCULAR; INTRAVENOUS
Status: DISCONTINUED | OUTPATIENT
Start: 2023-10-24 | End: 2023-10-24 | Stop reason: HOSPADM

## 2023-10-24 RX ORDER — CLINDAMYCIN PHOSPHATE 900 MG/50ML
900 INJECTION, SOLUTION INTRAVENOUS ONCE
Status: DISCONTINUED | OUTPATIENT
Start: 2023-10-24 | End: 2023-10-24 | Stop reason: HOSPADM

## 2023-10-24 RX ORDER — GINSENG 100 MG
CAPSULE ORAL PRN
Status: CANCELLED | OUTPATIENT
Start: 2023-10-24

## 2023-10-24 RX ORDER — PROCHLORPERAZINE EDISYLATE 5 MG/ML
5 INJECTION INTRAMUSCULAR; INTRAVENOUS
Status: DISCONTINUED | OUTPATIENT
Start: 2023-10-24 | End: 2023-10-24 | Stop reason: HOSPADM

## 2023-10-24 RX ORDER — SODIUM CHLORIDE 0.9 % (FLUSH) 0.9 %
5-40 SYRINGE (ML) INJECTION PRN
Status: DISCONTINUED | OUTPATIENT
Start: 2023-10-24 | End: 2023-10-24 | Stop reason: HOSPADM

## 2023-10-24 RX ORDER — LIDOCAINE HYDROCHLORIDE AND EPINEPHRINE 10; 10 MG/ML; UG/ML
20 INJECTION, SOLUTION INFILTRATION; PERINEURAL ONCE
Status: CANCELLED | OUTPATIENT
Start: 2023-10-24 | End: 2023-10-24

## 2023-10-24 RX ORDER — SODIUM CHLORIDE, SODIUM LACTATE, POTASSIUM CHLORIDE, CALCIUM CHLORIDE 600; 310; 30; 20 MG/100ML; MG/100ML; MG/100ML; MG/100ML
INJECTION, SOLUTION INTRAVENOUS CONTINUOUS PRN
Status: DISCONTINUED | OUTPATIENT
Start: 2023-10-24 | End: 2023-10-24 | Stop reason: SDUPTHER

## 2023-10-24 RX ORDER — OXYCODONE HYDROCHLORIDE 5 MG/1
5 TABLET ORAL
Status: DISCONTINUED | OUTPATIENT
Start: 2023-10-24 | End: 2023-10-24 | Stop reason: HOSPADM

## 2023-10-24 RX ORDER — PREDNISONE 20 MG/1
60 TABLET ORAL DAILY
Qty: 9 TABLET | Refills: 0 | Status: SHIPPED | OUTPATIENT
Start: 2023-10-24 | End: 2023-10-27

## 2023-10-24 RX ORDER — CIPROFLOXACIN HYDROCHLORIDE 3.5 MG/ML
5 SOLUTION/ DROPS TOPICAL 2 TIMES DAILY
Status: CANCELLED | OUTPATIENT
Start: 2023-10-24

## 2023-10-24 RX ORDER — LIDOCAINE HYDROCHLORIDE 20 MG/ML
INJECTION, SOLUTION EPIDURAL; INFILTRATION; INTRACAUDAL; PERINEURAL PRN
Status: DISCONTINUED | OUTPATIENT
Start: 2023-10-24 | End: 2023-10-24 | Stop reason: SDUPTHER

## 2023-10-24 RX ORDER — ROCURONIUM BROMIDE 10 MG/ML
INJECTION, SOLUTION INTRAVENOUS PRN
Status: DISCONTINUED | OUTPATIENT
Start: 2023-10-24 | End: 2023-10-24 | Stop reason: SDUPTHER

## 2023-10-24 RX ORDER — FENTANYL CITRATE 50 UG/ML
25 INJECTION, SOLUTION INTRAMUSCULAR; INTRAVENOUS EVERY 5 MIN PRN
Status: DISCONTINUED | OUTPATIENT
Start: 2023-10-24 | End: 2023-10-24 | Stop reason: HOSPADM

## 2023-10-24 RX ORDER — LIDOCAINE HYDROCHLORIDE 10 MG/ML
1 INJECTION, SOLUTION EPIDURAL; INFILTRATION; INTRACAUDAL; PERINEURAL
Status: DISCONTINUED | OUTPATIENT
Start: 2023-10-24 | End: 2023-10-24 | Stop reason: HOSPADM

## 2023-10-24 RX ORDER — SUCCINYLCHOLINE CHLORIDE 20 MG/ML
INJECTION INTRAMUSCULAR; INTRAVENOUS PRN
Status: DISCONTINUED | OUTPATIENT
Start: 2023-10-24 | End: 2023-10-24 | Stop reason: SDUPTHER

## 2023-10-24 RX ORDER — SODIUM CHLORIDE, SODIUM LACTATE, POTASSIUM CHLORIDE, CALCIUM CHLORIDE 600; 310; 30; 20 MG/100ML; MG/100ML; MG/100ML; MG/100ML
INJECTION, SOLUTION INTRAVENOUS CONTINUOUS
Status: DISCONTINUED | OUTPATIENT
Start: 2023-10-24 | End: 2023-10-24 | Stop reason: HOSPADM

## 2023-10-24 RX ORDER — DEXAMETHASONE SODIUM PHOSPHATE 4 MG/ML
INJECTION, SOLUTION INTRA-ARTICULAR; INTRALESIONAL; INTRAMUSCULAR; INTRAVENOUS; SOFT TISSUE PRN
Status: DISCONTINUED | OUTPATIENT
Start: 2023-10-24 | End: 2023-10-24 | Stop reason: SDUPTHER

## 2023-10-24 RX ORDER — ACETAMINOPHEN 500 MG
1000 TABLET ORAL ONCE
Status: COMPLETED | OUTPATIENT
Start: 2023-10-24 | End: 2023-10-24

## 2023-10-24 RX ORDER — MIDAZOLAM HYDROCHLORIDE 1 MG/ML
INJECTION INTRAMUSCULAR; INTRAVENOUS PRN
Status: DISCONTINUED | OUTPATIENT
Start: 2023-10-24 | End: 2023-10-24 | Stop reason: SDUPTHER

## 2023-10-24 RX ORDER — SODIUM CHLORIDE 0.9 % (FLUSH) 0.9 %
5-40 SYRINGE (ML) INJECTION EVERY 12 HOURS SCHEDULED
Status: DISCONTINUED | OUTPATIENT
Start: 2023-10-24 | End: 2023-10-24 | Stop reason: HOSPADM

## 2023-10-24 RX ORDER — HYDRALAZINE HYDROCHLORIDE 20 MG/ML
10 INJECTION INTRAMUSCULAR; INTRAVENOUS
Status: DISCONTINUED | OUTPATIENT
Start: 2023-10-24 | End: 2023-10-24 | Stop reason: HOSPADM

## 2023-10-24 RX ORDER — ONDANSETRON 2 MG/ML
4 INJECTION INTRAMUSCULAR; INTRAVENOUS
Status: DISCONTINUED | OUTPATIENT
Start: 2023-10-24 | End: 2023-10-24 | Stop reason: HOSPADM

## 2023-10-24 RX ORDER — DEXAMETHASONE SODIUM PHOSPHATE 10 MG/ML
10 INJECTION, SOLUTION INTRAMUSCULAR; INTRAVENOUS ONCE
Status: CANCELLED | OUTPATIENT
Start: 2023-10-24 | End: 2023-10-24

## 2023-10-24 RX ORDER — FENTANYL CITRATE 50 UG/ML
INJECTION, SOLUTION INTRAMUSCULAR; INTRAVENOUS PRN
Status: DISCONTINUED | OUTPATIENT
Start: 2023-10-24 | End: 2023-10-24 | Stop reason: SDUPTHER

## 2023-10-24 RX ORDER — HYDROCODONE BITARTRATE AND ACETAMINOPHEN 5; 325 MG/1; MG/1
1 TABLET ORAL EVERY 6 HOURS PRN
Qty: 20 TABLET | Refills: 0 | Status: SHIPPED | OUTPATIENT
Start: 2023-10-24 | End: 2023-10-29

## 2023-10-24 RX ADMIN — SUCCINYLCHOLINE CHLORIDE 100 MG: 20 INJECTION, SOLUTION INTRAMUSCULAR; INTRAVENOUS at 14:45

## 2023-10-24 RX ADMIN — PROPOFOL 200 MG: 10 INJECTION, EMULSION INTRAVENOUS at 15:16

## 2023-10-24 RX ADMIN — MIDAZOLAM 2 MG: 1 INJECTION INTRAMUSCULAR; INTRAVENOUS at 14:30

## 2023-10-24 RX ADMIN — RACEPINEPHRINE HYDROCHLORIDE 11.25 MG: 11.25 SOLUTION RESPIRATORY (INHALATION) at 15:40

## 2023-10-24 RX ADMIN — PROPOFOL 100 MG: 10 INJECTION, EMULSION INTRAVENOUS at 15:08

## 2023-10-24 RX ADMIN — SUCCINYLCHOLINE CHLORIDE 40 MG: 20 INJECTION, SOLUTION INTRAMUSCULAR; INTRAVENOUS at 15:05

## 2023-10-24 RX ADMIN — SODIUM CHLORIDE, POTASSIUM CHLORIDE, SODIUM LACTATE AND CALCIUM CHLORIDE: 600; 310; 30; 20 INJECTION, SOLUTION INTRAVENOUS at 14:30

## 2023-10-24 RX ADMIN — LIDOCAINE HYDROCHLORIDE 60 MG: 20 INJECTION, SOLUTION EPIDURAL; INFILTRATION; INTRACAUDAL; PERINEURAL at 14:44

## 2023-10-24 RX ADMIN — SUCCINYLCHOLINE CHLORIDE 100 MG: 20 INJECTION, SOLUTION INTRAMUSCULAR; INTRAVENOUS at 14:50

## 2023-10-24 RX ADMIN — DEXAMETHASONE SODIUM PHOSPHATE 12 MG: 4 INJECTION, SOLUTION INTRAMUSCULAR; INTRAVENOUS at 15:22

## 2023-10-24 RX ADMIN — SUCCINYLCHOLINE CHLORIDE 40 MG: 20 INJECTION, SOLUTION INTRAMUSCULAR; INTRAVENOUS at 15:16

## 2023-10-24 RX ADMIN — PROPOFOL 200 MG: 10 INJECTION, EMULSION INTRAVENOUS at 14:44

## 2023-10-24 RX ADMIN — PROPOFOL 100 MG: 10 INJECTION, EMULSION INTRAVENOUS at 14:50

## 2023-10-24 RX ADMIN — FENTANYL CITRATE 100 MCG: 50 INJECTION, SOLUTION INTRAMUSCULAR; INTRAVENOUS at 14:44

## 2023-10-24 RX ADMIN — SODIUM CHLORIDE, POTASSIUM CHLORIDE, SODIUM LACTATE AND CALCIUM CHLORIDE: 600; 310; 30; 20 INJECTION, SOLUTION INTRAVENOUS at 13:52

## 2023-10-24 RX ADMIN — PROPOFOL 150 MG: 10 INJECTION, EMULSION INTRAVENOUS at 14:53

## 2023-10-24 RX ADMIN — ACETAMINOPHEN 1000 MG: 500 TABLET ORAL at 13:55

## 2023-10-24 RX ADMIN — SUCCINYLCHOLINE CHLORIDE 40 MG: 20 INJECTION, SOLUTION INTRAMUSCULAR; INTRAVENOUS at 14:55

## 2023-10-24 RX ADMIN — ROCURONIUM BROMIDE 5 MG: 10 SOLUTION INTRAVENOUS at 14:44

## 2023-10-24 RX ADMIN — PROPOFOL 100 MG: 10 INJECTION, EMULSION INTRAVENOUS at 15:00

## 2023-10-24 ASSESSMENT — PAIN - FUNCTIONAL ASSESSMENT: PAIN_FUNCTIONAL_ASSESSMENT: 0-10

## 2023-10-24 NOTE — H&P
recurrence rates are 25% and the need for further operations discussed ,  Pt was asked to stop smoking before and after surgery   and advised to stop for at least 2 weeks prior if total cessation could not be achieved. The risks of general anesthetic was discussed      The patient appeared to understands the risks; any and all questions were answered to the patient's satisfaction.     Signed By: Tyson Zeng MD     October 24, 2023

## 2023-10-24 NOTE — PROGRESS NOTES
Discharge instructions reviewed with patient and family using teachback . All questions have been answered. Prescriptions sent to [facility] pharmacy. Vital signs stable, pain appropriately managed. Patient wheeled off the unit with PACU staff.

## 2023-10-24 NOTE — BRIEF OP NOTE
Brief Postoperative Note      Patient: Riana Hogan  YOB: 1955  MRN: [id number]    Date of Procedure: 10/24/2023    Pre-Op Diagnosis Codes:     * Chronic mastoiditis of left side [H70.12]     * Cholesteatoma of attic of left ear [H71.02]     * Mixed conductive and sensorineural hearing loss of left ear with restricted hearing of right ear [H90. 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

## 2023-10-25 NOTE — OP NOTE
of which were normal.  Epiglottis was normal.  The piriform sinuses appeared to be free of disease. The glottic region appeared to be normal.  The vocal cords appeared to be open and extending into the subglottic region and down into the bronchus. There was approximately a 50% subglottic stenosis that was found blocking the anterior portion of the mainstem bronchus. At this point, a 4/5 endotracheal tube under C-MAC visualization was trying to be pass the subglottic stenosis, unfortunately could not be passed and for the safety of the patient I decided that the best option would be to not to perform the operation due to possible swelling postoperatively and already compromised airway. At this point, the general anesthesia was then revised and the patient went to the PACU in stable condition.       Lukas Tapia MD      [id number]  D:  10/24/2023 15:28  T:  10/25/2023 0:32  JOB #:  [id number]

## 2023-12-04 ENCOUNTER — HOSPITAL ENCOUNTER (OUTPATIENT)
Facility: HOSPITAL | Age: 68
Setting detail: OBSERVATION
LOS: 1 days | Discharge: HOME | DRG: 191 | End: 2023-12-05
Attending: EMERGENCY MEDICINE | Admitting: FAMILY MEDICINE
Payer: MEDICARE

## 2023-12-04 ENCOUNTER — APPOINTMENT (OUTPATIENT)
Facility: HOSPITAL | Age: 68
DRG: 191 | End: 2023-12-04
Payer: MEDICARE

## 2023-12-04 DIAGNOSIS — J40 BRONCHITIS: Primary | ICD-10-CM

## 2023-12-04 DIAGNOSIS — R06.00 DYSPNEA, UNSPECIFIED TYPE: ICD-10-CM

## 2023-12-04 LAB
ALBUMIN SERPL-MCNC: 4.1 G/DL (ref 3.5–5)
ALBUMIN/GLOB SERPL: 1.1 (ref 1.1–2.2)
ALP SERPL-CCNC: 85 U/L (ref 45–117)
ALT SERPL-CCNC: 22 U/L (ref 12–78)
ANION GAP SERPL CALC-SCNC: 3 MMOL/L (ref 5–15)
AST SERPL-CCNC: 44 U/L (ref 15–37)
BASOPHILS # BLD: 0.1 K/UL (ref 0–0.1)
BASOPHILS NFR BLD: 1 % (ref 0–1)
BILIRUB SERPL-MCNC: 0.6 MG/DL (ref 0.2–1)
BUN SERPL-MCNC: 11 MG/DL (ref 6–20)
BUN/CREAT SERPL: 22 (ref 12–20)
CALCIUM SERPL-MCNC: 8.9 MG/DL (ref 8.5–10.1)
CHLORIDE SERPL-SCNC: 102 MMOL/L (ref 97–108)
CO2 SERPL-SCNC: 29 MMOL/L (ref 21–32)
COMMENT:: NORMAL
CREAT SERPL-MCNC: 0.51 MG/DL (ref 0.55–1.02)
DIFFERENTIAL METHOD BLD: ABNORMAL
EKG ATRIAL RATE: 94 BPM
EKG DIAGNOSIS: NORMAL
EKG P AXIS: 73 DEGREES
EKG P-R INTERVAL: 126 MS
EKG Q-T INTERVAL: 362 MS
EKG QRS DURATION: 78 MS
EKG QTC CALCULATION (BAZETT): 452 MS
EKG R AXIS: 92 DEGREES
EKG T AXIS: 70 DEGREES
EKG VENTRICULAR RATE: 94 BPM
EOSINOPHIL # BLD: 0 K/UL (ref 0–0.4)
EOSINOPHIL NFR BLD: 0 % (ref 0–7)
ERYTHROCYTE [DISTWIDTH] IN BLOOD BY AUTOMATED COUNT: 15.1 % (ref 11.5–14.5)
FLUAV AG NPH QL IA: NEGATIVE
FLUBV AG NOSE QL IA: NEGATIVE
GLOBULIN SER CALC-MCNC: 3.8 G/DL (ref 2–4)
GLUCOSE SERPL-MCNC: 154 MG/DL (ref 65–100)
HCT VFR BLD AUTO: 47 % (ref 35–47)
HGB BLD-MCNC: 15.5 G/DL (ref 11.5–16)
IMM GRANULOCYTES # BLD AUTO: 0 K/UL (ref 0–0.04)
IMM GRANULOCYTES NFR BLD AUTO: 0 % (ref 0–0.5)
LACTATE SERPL-SCNC: 1.4 MMOL/L (ref 0.4–2)
LIPASE SERPL-CCNC: 22 U/L (ref 13–75)
LYMPHOCYTES # BLD: 0.5 K/UL (ref 0.8–3.5)
LYMPHOCYTES NFR BLD: 4 % (ref 12–49)
MAGNESIUM SERPL-MCNC: 2.1 MG/DL (ref 1.6–2.4)
MCH RBC QN AUTO: 30 PG (ref 26–34)
MCHC RBC AUTO-ENTMCNC: 33 G/DL (ref 30–36.5)
MCV RBC AUTO: 91.1 FL (ref 80–99)
MONOCYTES # BLD: 0.4 K/UL (ref 0–1)
MONOCYTES NFR BLD: 3 % (ref 5–13)
NEUTS SEG # BLD: 10.8 K/UL (ref 1.8–8)
NEUTS SEG NFR BLD: 92 % (ref 32–75)
NRBC # BLD: 0 K/UL (ref 0–0.01)
NRBC BLD-RTO: 0 PER 100 WBC
NT PRO BNP: 1479 PG/ML
PLATELET # BLD AUTO: 233 K/UL (ref 150–400)
PMV BLD AUTO: 12.3 FL (ref 8.9–12.9)
POTASSIUM SERPL-SCNC: 4.3 MMOL/L (ref 3.5–5.1)
PROCALCITONIN SERPL-MCNC: <0.05 NG/ML
PROT SERPL-MCNC: 7.9 G/DL (ref 6.4–8.2)
RBC # BLD AUTO: 5.16 M/UL (ref 3.8–5.2)
RBC MORPH BLD: ABNORMAL
SARS-COV-2 RDRP RESP QL NAA+PROBE: NOT DETECTED
SODIUM SERPL-SCNC: 134 MMOL/L (ref 136–145)
SOURCE: NORMAL
SPECIMEN HOLD: NORMAL
TROPONIN I SERPL HS-MCNC: 33 NG/L (ref 0–51)
TROPONIN I SERPL HS-MCNC: 34 NG/L (ref 0–51)
WBC # BLD AUTO: 11.8 K/UL (ref 3.6–11)

## 2023-12-04 PROCEDURE — 84484 ASSAY OF TROPONIN QUANT: CPT

## 2023-12-04 PROCEDURE — 93010 ELECTROCARDIOGRAM REPORT: CPT | Performed by: SPECIALIST

## 2023-12-04 PROCEDURE — 84145 PROCALCITONIN (PCT): CPT

## 2023-12-04 PROCEDURE — 83880 ASSAY OF NATRIURETIC PEPTIDE: CPT

## 2023-12-04 PROCEDURE — 6360000002 HC RX W HCPCS: Performed by: PHYSICIAN ASSISTANT

## 2023-12-04 PROCEDURE — 6370000000 HC RX 637 (ALT 250 FOR IP): Performed by: PHYSICIAN ASSISTANT

## 2023-12-04 PROCEDURE — 87040 BLOOD CULTURE FOR BACTERIA: CPT

## 2023-12-04 PROCEDURE — 83690 ASSAY OF LIPASE: CPT

## 2023-12-04 PROCEDURE — 87635 SARS-COV-2 COVID-19 AMP PRB: CPT

## 2023-12-04 PROCEDURE — 94640 AIRWAY INHALATION TREATMENT: CPT

## 2023-12-04 PROCEDURE — 93005 ELECTROCARDIOGRAM TRACING: CPT | Performed by: EMERGENCY MEDICINE

## 2023-12-04 PROCEDURE — 36415 COLL VENOUS BLD VENIPUNCTURE: CPT

## 2023-12-04 PROCEDURE — 83735 ASSAY OF MAGNESIUM: CPT

## 2023-12-04 PROCEDURE — 71275 CT ANGIOGRAPHY CHEST: CPT

## 2023-12-04 PROCEDURE — 2580000003 HC RX 258: Performed by: PHYSICIAN ASSISTANT

## 2023-12-04 PROCEDURE — 80053 COMPREHEN METABOLIC PANEL: CPT

## 2023-12-04 PROCEDURE — 1100000000 HC RM PRIVATE

## 2023-12-04 PROCEDURE — 85025 COMPLETE CBC W/AUTO DIFF WBC: CPT

## 2023-12-04 PROCEDURE — 87804 INFLUENZA ASSAY W/OPTIC: CPT

## 2023-12-04 PROCEDURE — 99285 EMERGENCY DEPT VISIT HI MDM: CPT

## 2023-12-04 PROCEDURE — 6370000000 HC RX 637 (ALT 250 FOR IP)

## 2023-12-04 PROCEDURE — 6360000004 HC RX CONTRAST MEDICATION: Performed by: STUDENT IN AN ORGANIZED HEALTH CARE EDUCATION/TRAINING PROGRAM

## 2023-12-04 PROCEDURE — 2580000003 HC RX 258: Performed by: EMERGENCY MEDICINE

## 2023-12-04 PROCEDURE — 83605 ASSAY OF LACTIC ACID: CPT

## 2023-12-04 PROCEDURE — 6370000000 HC RX 637 (ALT 250 FOR IP): Performed by: EMERGENCY MEDICINE

## 2023-12-04 RX ORDER — SODIUM CHLORIDE, SODIUM LACTATE, POTASSIUM CHLORIDE, CALCIUM CHLORIDE 600; 310; 30; 20 MG/100ML; MG/100ML; MG/100ML; MG/100ML
INJECTION, SOLUTION INTRAVENOUS CONTINUOUS
Status: DISCONTINUED | OUTPATIENT
Start: 2023-12-04 | End: 2023-12-05

## 2023-12-04 RX ORDER — BENZONATATE 100 MG/1
100 CAPSULE ORAL 3 TIMES DAILY PRN
Status: DISCONTINUED | OUTPATIENT
Start: 2023-12-04 | End: 2023-12-05 | Stop reason: HOSPADM

## 2023-12-04 RX ORDER — POLYETHYLENE GLYCOL 3350 17 G/17G
17 POWDER, FOR SOLUTION ORAL DAILY PRN
Status: DISCONTINUED | OUTPATIENT
Start: 2023-12-04 | End: 2023-12-05 | Stop reason: HOSPADM

## 2023-12-04 RX ORDER — ONDANSETRON 2 MG/ML
4 INJECTION INTRAMUSCULAR; INTRAVENOUS EVERY 6 HOURS PRN
Status: DISCONTINUED | OUTPATIENT
Start: 2023-12-04 | End: 2023-12-05 | Stop reason: HOSPADM

## 2023-12-04 RX ORDER — ACETAMINOPHEN 325 MG/1
650 TABLET ORAL EVERY 6 HOURS PRN
Status: DISCONTINUED | OUTPATIENT
Start: 2023-12-04 | End: 2023-12-05 | Stop reason: HOSPADM

## 2023-12-04 RX ORDER — SPIRONOLACTONE 25 MG/1
12.5 TABLET ORAL DAILY
Status: DISCONTINUED | OUTPATIENT
Start: 2023-12-04 | End: 2023-12-05 | Stop reason: HOSPADM

## 2023-12-04 RX ORDER — LEVOTHYROXINE SODIUM 0.1 MG/1
100 TABLET ORAL
Status: DISCONTINUED | OUTPATIENT
Start: 2023-12-05 | End: 2023-12-05 | Stop reason: HOSPADM

## 2023-12-04 RX ORDER — ASPIRIN 81 MG/1
81 TABLET ORAL DAILY
Status: DISCONTINUED | OUTPATIENT
Start: 2023-12-04 | End: 2023-12-05 | Stop reason: HOSPADM

## 2023-12-04 RX ORDER — ENOXAPARIN SODIUM 100 MG/ML
40 INJECTION SUBCUTANEOUS DAILY
Status: DISCONTINUED | OUTPATIENT
Start: 2023-12-04 | End: 2023-12-05 | Stop reason: HOSPADM

## 2023-12-04 RX ORDER — SODIUM CHLORIDE 9 MG/ML
INJECTION, SOLUTION INTRAVENOUS PRN
Status: DISCONTINUED | OUTPATIENT
Start: 2023-12-04 | End: 2023-12-05 | Stop reason: HOSPADM

## 2023-12-04 RX ORDER — ONDANSETRON 4 MG/1
4 TABLET, ORALLY DISINTEGRATING ORAL EVERY 8 HOURS PRN
Status: DISCONTINUED | OUTPATIENT
Start: 2023-12-04 | End: 2023-12-05 | Stop reason: HOSPADM

## 2023-12-04 RX ORDER — ZOLPIDEM TARTRATE 5 MG/1
10 TABLET ORAL NIGHTLY PRN
Status: DISCONTINUED | OUTPATIENT
Start: 2023-12-04 | End: 2023-12-05 | Stop reason: HOSPADM

## 2023-12-04 RX ORDER — PREDNISONE 20 MG/1
60 TABLET ORAL
Status: COMPLETED | OUTPATIENT
Start: 2023-12-04 | End: 2023-12-04

## 2023-12-04 RX ORDER — SODIUM CHLORIDE 0.9 % (FLUSH) 0.9 %
5-40 SYRINGE (ML) INJECTION EVERY 12 HOURS SCHEDULED
Status: DISCONTINUED | OUTPATIENT
Start: 2023-12-04 | End: 2023-12-05 | Stop reason: HOSPADM

## 2023-12-04 RX ORDER — GUAIFENESIN 600 MG/1
600 TABLET, EXTENDED RELEASE ORAL
Status: DISCONTINUED | OUTPATIENT
Start: 2023-12-04 | End: 2023-12-04

## 2023-12-04 RX ORDER — AMLODIPINE BESYLATE 5 MG/1
5 TABLET ORAL DAILY
Status: DISCONTINUED | OUTPATIENT
Start: 2023-12-04 | End: 2023-12-05 | Stop reason: HOSPADM

## 2023-12-04 RX ORDER — AZITHROMYCIN 250 MG/1
500 TABLET, FILM COATED ORAL DAILY
Status: DISCONTINUED | OUTPATIENT
Start: 2023-12-04 | End: 2023-12-05

## 2023-12-04 RX ORDER — 0.9 % SODIUM CHLORIDE 0.9 %
1000 INTRAVENOUS SOLUTION INTRAVENOUS ONCE
Status: COMPLETED | OUTPATIENT
Start: 2023-12-04 | End: 2023-12-04

## 2023-12-04 RX ORDER — IPRATROPIUM BROMIDE AND ALBUTEROL SULFATE 2.5; .5 MG/3ML; MG/3ML
1 SOLUTION RESPIRATORY (INHALATION) EVERY 4 HOURS PRN
Status: DISCONTINUED | OUTPATIENT
Start: 2023-12-04 | End: 2023-12-05 | Stop reason: HOSPADM

## 2023-12-04 RX ORDER — IPRATROPIUM BROMIDE AND ALBUTEROL SULFATE 2.5; .5 MG/3ML; MG/3ML
1 SOLUTION RESPIRATORY (INHALATION)
Status: COMPLETED | OUTPATIENT
Start: 2023-12-04 | End: 2023-12-04

## 2023-12-04 RX ORDER — ACETAMINOPHEN 650 MG/1
650 SUPPOSITORY RECTAL EVERY 6 HOURS PRN
Status: DISCONTINUED | OUTPATIENT
Start: 2023-12-04 | End: 2023-12-05 | Stop reason: HOSPADM

## 2023-12-04 RX ORDER — POTASSIUM CHLORIDE 750 MG/1
20 TABLET, FILM COATED, EXTENDED RELEASE ORAL 2 TIMES DAILY
Status: DISCONTINUED | OUTPATIENT
Start: 2023-12-04 | End: 2023-12-05 | Stop reason: HOSPADM

## 2023-12-04 RX ORDER — GUAIFENESIN 600 MG/1
600 TABLET, EXTENDED RELEASE ORAL 2 TIMES DAILY
Status: DISCONTINUED | OUTPATIENT
Start: 2023-12-04 | End: 2023-12-05 | Stop reason: HOSPADM

## 2023-12-04 RX ORDER — SODIUM CHLORIDE 0.9 % (FLUSH) 0.9 %
5-40 SYRINGE (ML) INJECTION PRN
Status: DISCONTINUED | OUTPATIENT
Start: 2023-12-04 | End: 2023-12-05 | Stop reason: HOSPADM

## 2023-12-04 RX ORDER — ACETYLCYSTEINE 200 MG/ML
600 SOLUTION ORAL; RESPIRATORY (INHALATION)
Status: DISCONTINUED | OUTPATIENT
Start: 2023-12-04 | End: 2023-12-05

## 2023-12-04 RX ADMIN — ACETAMINOPHEN 650 MG: 325 TABLET ORAL at 14:01

## 2023-12-04 RX ADMIN — GUAIFENESIN 600 MG: 600 TABLET, EXTENDED RELEASE ORAL at 17:22

## 2023-12-04 RX ADMIN — GUAIFENESIN 600 MG: 600 TABLET, EXTENDED RELEASE ORAL at 16:18

## 2023-12-04 RX ADMIN — ACETAMINOPHEN 650 MG: 325 TABLET ORAL at 21:01

## 2023-12-04 RX ADMIN — SODIUM CHLORIDE, POTASSIUM CHLORIDE, SODIUM LACTATE AND CALCIUM CHLORIDE: 600; 310; 30; 20 INJECTION, SOLUTION INTRAVENOUS at 17:58

## 2023-12-04 RX ADMIN — IPRATROPIUM BROMIDE AND ALBUTEROL SULFATE 1 DOSE: 2.5; .5 SOLUTION RESPIRATORY (INHALATION) at 19:39

## 2023-12-04 RX ADMIN — ASPIRIN 81 MG: 81 TABLET, COATED ORAL at 16:16

## 2023-12-04 RX ADMIN — SODIUM CHLORIDE 1000 ML: 9 INJECTION, SOLUTION INTRAVENOUS at 10:23

## 2023-12-04 RX ADMIN — IPRATROPIUM BROMIDE AND ALBUTEROL SULFATE 1 DOSE: 2.5; .5 SOLUTION RESPIRATORY (INHALATION) at 12:55

## 2023-12-04 RX ADMIN — SPIRONOLACTONE 12.5 MG: 25 TABLET ORAL at 16:16

## 2023-12-04 RX ADMIN — AMLODIPINE BESYLATE 5 MG: 5 TABLET ORAL at 17:22

## 2023-12-04 RX ADMIN — POTASSIUM CHLORIDE 20 MEQ: 750 TABLET, FILM COATED, EXTENDED RELEASE ORAL at 16:16

## 2023-12-04 RX ADMIN — ZOLPIDEM TARTRATE 10 MG: 5 TABLET ORAL at 22:50

## 2023-12-04 RX ADMIN — WATER 1000 MG: 1 INJECTION INTRAMUSCULAR; INTRAVENOUS; SUBCUTANEOUS at 15:24

## 2023-12-04 RX ADMIN — IPRATROPIUM BROMIDE AND ALBUTEROL SULFATE 1 DOSE: 2.5; .5 SOLUTION RESPIRATORY (INHALATION) at 13:54

## 2023-12-04 RX ADMIN — AZITHROMYCIN DIHYDRATE 500 MG: 250 TABLET, FILM COATED ORAL at 17:22

## 2023-12-04 RX ADMIN — ACETYLCYSTEINE 600 MG: 200 INHALANT RESPIRATORY (INHALATION) at 13:54

## 2023-12-04 RX ADMIN — ACETYLCYSTEINE 600 MG: 200 INHALANT RESPIRATORY (INHALATION) at 19:39

## 2023-12-04 RX ADMIN — IOPAMIDOL 80 ML: 755 INJECTION, SOLUTION INTRAVENOUS at 10:49

## 2023-12-04 RX ADMIN — PREDNISONE 60 MG: 20 TABLET ORAL at 12:56

## 2023-12-04 ASSESSMENT — ENCOUNTER SYMPTOMS
SHORTNESS OF BREATH: 1
COUGH: 1

## 2023-12-04 ASSESSMENT — PAIN DESCRIPTION - ORIENTATION
ORIENTATION: LEFT;RIGHT
ORIENTATION: RIGHT;LEFT

## 2023-12-04 ASSESSMENT — PAIN DESCRIPTION - DESCRIPTORS
DESCRIPTORS: ACHING
DESCRIPTORS: ACHING;DISCOMFORT

## 2023-12-04 ASSESSMENT — PAIN DESCRIPTION - FREQUENCY: FREQUENCY: CONTINUOUS

## 2023-12-04 ASSESSMENT — PAIN SCALES - GENERAL
PAINLEVEL_OUTOF10: 3
PAINLEVEL_OUTOF10: 6
PAINLEVEL_OUTOF10: 7

## 2023-12-04 ASSESSMENT — PAIN DESCRIPTION - LOCATION
LOCATION: HEAD
LOCATION: RIB CAGE
LOCATION: RIB CAGE

## 2023-12-04 ASSESSMENT — PAIN DESCRIPTION - PAIN TYPE
TYPE: ACUTE PAIN
TYPE: ACUTE PAIN

## 2023-12-04 ASSESSMENT — PAIN DESCRIPTION - ONSET: ONSET: ON-GOING

## 2023-12-04 ASSESSMENT — PAIN - FUNCTIONAL ASSESSMENT: PAIN_FUNCTIONAL_ASSESSMENT: 0-10

## 2023-12-04 NOTE — H&P
History and Physical    Date of Service:  12/4/2023  Primary Care Provider: Cornelius Bowden MD  Source of information: Patient, Chart Review    Chief Complaint: Shortness of Breath      History of Presenting Illness:   Elizabeth Camp is a 70 y.o. female with a PMHx of GERD, HTN, HLD, Hypothyroidism, Vasculitis and COPD. She presents to the ED from home with a chief complaint of shortness of breath and wheezing that have been worsening over the past week. Notably she did have a rituxan infusion for her vasculitis recently and she is concerned this has immunosuppressed her. She reports increased work of breathing and shortness of breath. The patient denies any headache, blurry vision, sore throat, trouble swallowing, trouble with speech, chest pain, SOB, cough, fever, chills, N/V/D, abd pain, urinary symptoms, constipation, recent travels, sick contacts, focal or generalized neurological symptoms, falls, injuries, rashes, contact with COVID-19 diagnosed patients, hematemesis, melena, hemoptysis, hematuria, rashes, denies starting any new medications and denies any other concerns or problems besides as mentioned above. REVIEW OF SYSTEMS:  A comprehensive review of systems was negative except for that written in the History of Present Illness.      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

## 2023-12-04 NOTE — ED TRIAGE NOTES
Pt comes to ED from home with reports of worsening SOB and WOB for 1 week. States not relief with home neds and prescribed antibiotics. Nancy Su, NP assessing pt in triage.

## 2023-12-05 VITALS
WEIGHT: 129.41 LBS | RESPIRATION RATE: 18 BRPM | BODY MASS INDEX: 20.8 KG/M2 | SYSTOLIC BLOOD PRESSURE: 126 MMHG | DIASTOLIC BLOOD PRESSURE: 68 MMHG | TEMPERATURE: 98.6 F | OXYGEN SATURATION: 95 % | HEIGHT: 66 IN | HEART RATE: 86 BPM

## 2023-12-05 LAB
ANION GAP SERPL CALC-SCNC: 5 MMOL/L (ref 5–15)
BASOPHILS # BLD: 0.1 K/UL (ref 0–0.1)
BASOPHILS NFR BLD: 1 % (ref 0–1)
BUN SERPL-MCNC: 7 MG/DL (ref 6–20)
BUN/CREAT SERPL: 17 (ref 12–20)
CALCIUM SERPL-MCNC: 8.2 MG/DL (ref 8.5–10.1)
CHLORIDE SERPL-SCNC: 110 MMOL/L (ref 97–108)
CO2 SERPL-SCNC: 25 MMOL/L (ref 21–32)
COMMENT:: NORMAL
CREAT SERPL-MCNC: 0.41 MG/DL (ref 0.55–1.02)
DIFFERENTIAL METHOD BLD: ABNORMAL
EOSINOPHIL # BLD: 0.1 K/UL (ref 0–0.4)
EOSINOPHIL NFR BLD: 1 % (ref 0–7)
ERYTHROCYTE [DISTWIDTH] IN BLOOD BY AUTOMATED COUNT: 14.9 % (ref 11.5–14.5)
GLUCOSE SERPL-MCNC: 90 MG/DL (ref 65–100)
HCT VFR BLD AUTO: 43.2 % (ref 35–47)
HGB BLD-MCNC: 14.2 G/DL (ref 11.5–16)
IMM GRANULOCYTES # BLD AUTO: 0 K/UL (ref 0–0.04)
IMM GRANULOCYTES NFR BLD AUTO: 0 % (ref 0–0.5)
LYMPHOCYTES # BLD: 1.6 K/UL (ref 0.8–3.5)
LYMPHOCYTES NFR BLD: 21 % (ref 12–49)
MCH RBC QN AUTO: 29.8 PG (ref 26–34)
MCHC RBC AUTO-ENTMCNC: 32.9 G/DL (ref 30–36.5)
MCV RBC AUTO: 90.6 FL (ref 80–99)
MONOCYTES # BLD: 0.7 K/UL (ref 0–1)
MONOCYTES NFR BLD: 10 % (ref 5–13)
NEUTS SEG # BLD: 5.1 K/UL (ref 1.8–8)
NEUTS SEG NFR BLD: 67 % (ref 32–75)
NRBC # BLD: 0 K/UL (ref 0–0.01)
NRBC BLD-RTO: 0 PER 100 WBC
PLATELET # BLD AUTO: 301 K/UL (ref 150–400)
PMV BLD AUTO: 10.3 FL (ref 8.9–12.9)
POTASSIUM SERPL-SCNC: 3.8 MMOL/L (ref 3.5–5.1)
RBC # BLD AUTO: 4.77 M/UL (ref 3.8–5.2)
SODIUM SERPL-SCNC: 140 MMOL/L (ref 136–145)
SPECIMEN HOLD: NORMAL
WBC # BLD AUTO: 7.6 K/UL (ref 3.6–11)

## 2023-12-05 PROCEDURE — 6370000000 HC RX 637 (ALT 250 FOR IP): Performed by: PHYSICIAN ASSISTANT

## 2023-12-05 PROCEDURE — G0378 HOSPITAL OBSERVATION PER HR: HCPCS

## 2023-12-05 PROCEDURE — 6360000002 HC RX W HCPCS: Performed by: PHYSICIAN ASSISTANT

## 2023-12-05 PROCEDURE — 80048 BASIC METABOLIC PNL TOTAL CA: CPT

## 2023-12-05 PROCEDURE — 6370000000 HC RX 637 (ALT 250 FOR IP): Performed by: NURSE PRACTITIONER

## 2023-12-05 PROCEDURE — 36415 COLL VENOUS BLD VENIPUNCTURE: CPT

## 2023-12-05 PROCEDURE — 94640 AIRWAY INHALATION TREATMENT: CPT

## 2023-12-05 PROCEDURE — 85025 COMPLETE CBC W/AUTO DIFF WBC: CPT

## 2023-12-05 RX ORDER — GUAIFENESIN 600 MG/1
600 TABLET, EXTENDED RELEASE ORAL 2 TIMES DAILY
Qty: 28 TABLET | Refills: 0 | Status: SHIPPED | OUTPATIENT
Start: 2023-12-05 | End: 2023-12-19

## 2023-12-05 RX ORDER — PREDNISONE 20 MG/1
40 TABLET ORAL DAILY
Status: DISCONTINUED | OUTPATIENT
Start: 2023-12-05 | End: 2023-12-05 | Stop reason: HOSPADM

## 2023-12-05 RX ORDER — BENZONATATE 100 MG/1
100 CAPSULE ORAL 3 TIMES DAILY PRN
Qty: 20 CAPSULE | Refills: 0 | Status: SHIPPED | OUTPATIENT
Start: 2023-12-05 | End: 2023-12-12

## 2023-12-05 RX ORDER — PREDNISONE 20 MG/1
40 TABLET ORAL DAILY
Qty: 8 TABLET | Refills: 0 | Status: SHIPPED | OUTPATIENT
Start: 2023-12-05 | End: 2023-12-09

## 2023-12-05 RX ORDER — AZITHROMYCIN 250 MG/1
250 TABLET, FILM COATED ORAL DAILY
Qty: 4 TABLET | Refills: 0 | Status: SHIPPED | OUTPATIENT
Start: 2023-12-05 | End: 2023-12-09

## 2023-12-05 RX ORDER — PANTOPRAZOLE SODIUM 40 MG/1
40 TABLET, DELAYED RELEASE ORAL DAILY
Status: DISCONTINUED | OUTPATIENT
Start: 2023-12-05 | End: 2023-12-05 | Stop reason: HOSPADM

## 2023-12-05 RX ORDER — AZITHROMYCIN 250 MG/1
250 TABLET, FILM COATED ORAL DAILY
Status: DISCONTINUED | OUTPATIENT
Start: 2023-12-05 | End: 2023-12-05 | Stop reason: HOSPADM

## 2023-12-05 RX ADMIN — GUAIFENESIN 600 MG: 600 TABLET, EXTENDED RELEASE ORAL at 08:41

## 2023-12-05 RX ADMIN — PREDNISONE 40 MG: 20 TABLET ORAL at 10:51

## 2023-12-05 RX ADMIN — SPIRONOLACTONE 12.5 MG: 25 TABLET ORAL at 08:40

## 2023-12-05 RX ADMIN — PANTOPRAZOLE SODIUM 40 MG: 40 TABLET, DELAYED RELEASE ORAL at 10:51

## 2023-12-05 RX ADMIN — NYSTATIN 500000 UNITS: 100000 SUSPENSION ORAL at 12:04

## 2023-12-05 RX ADMIN — ACETAMINOPHEN 650 MG: 325 TABLET ORAL at 10:54

## 2023-12-05 RX ADMIN — AMLODIPINE BESYLATE 5 MG: 5 TABLET ORAL at 08:41

## 2023-12-05 RX ADMIN — IPRATROPIUM BROMIDE AND ALBUTEROL SULFATE 1 DOSE: 2.5; .5 SOLUTION RESPIRATORY (INHALATION) at 08:49

## 2023-12-05 RX ADMIN — POTASSIUM CHLORIDE 20 MEQ: 750 TABLET, FILM COATED, EXTENDED RELEASE ORAL at 08:41

## 2023-12-05 RX ADMIN — LEVOTHYROXINE SODIUM 100 MCG: 0.1 TABLET ORAL at 07:04

## 2023-12-05 RX ADMIN — ACETYLCYSTEINE 600 MG: 200 INHALANT RESPIRATORY (INHALATION) at 08:49

## 2023-12-05 RX ADMIN — ACETAMINOPHEN 650 MG: 325 TABLET ORAL at 04:45

## 2023-12-05 RX ADMIN — AZITHROMYCIN DIHYDRATE 500 MG: 250 TABLET, FILM COATED ORAL at 08:41

## 2023-12-05 RX ADMIN — ASPIRIN 81 MG: 81 TABLET, COATED ORAL at 08:41

## 2023-12-05 ASSESSMENT — PAIN SCALES - GENERAL
PAINLEVEL_OUTOF10: 0
PAINLEVEL_OUTOF10: 3

## 2023-12-05 NOTE — DISCHARGE INSTRUCTIONS
Discharge Instructions       PATIENT ID: Jonathan Real  MRN: [id number]   YOB: 1955    DATE OF ADMISSION: 12/4/2023   DATE OF DISCHARGE: 12/5/2023    PRIMARY CARE PROVIDER: Jennifer Hirsch     ATTENDING PHYSICIAN: Yahaira Braswell MD   DISCHARGING PROVIDER: ERIN Vasquez NP    To contact this individual call [phone] and ask the  to page. If unavailable ask to be transferred the Adult Hospitalist Department. DISCHARGE DIAGNOSES COPD exacerbation, thrush    CONSULTATIONS: None    PROCEDURES/SURGERIES: * No surgery found *    PENDING TEST RESULTS:   At the time of discharge the following test results are still pending:     FOLLOW UP APPOINTMENTS:     Jennifer Hirsch MD Family Medicine Follow up in 1 week(s)   [address] [place] [address]   [facility]         [address] [facility] [id number] [phone]                      ADDITIONAL CARE RECOMMENDATIONS: Wear a mask when outside     DIET: regular diet        ACTIVITY: activity as tolerated     WOUND CARE: na     EQUIPMENT needed:       DISCHARGE MEDICATIONS:   See Medication Reconciliation Form    It is important that you take the medication exactly as they are prescribed. Keep your medication in the bottles provided by the pharmacist and keep a list of the medication names, dosages, and times to be taken in your wallet. Do not take other medications without consulting your doctor. NOTIFY YOUR PHYSICIAN FOR ANY OF THE FOLLOWING:   Fever over 101 degrees for 24 hours. Chest pain, shortness of breath, fever, chills, nausea, vomiting, diarrhea, change in mentation, falling, weakness, bleeding. Severe pain or pain not relieved by medications. Or, any other signs or symptoms that you may have questions about.       DISPOSITION:   x Home With:   OT  PT  HH  RN       SNF/Inpatient Rehab/LTAC    Independent/assisted living

## 2023-12-05 NOTE — DISCHARGE SUMMARY
Discharge Summary       PATIENT ID: Alia Cunha  MRN: [id number]   YOB: 1955    DATE OF ADMISSION: 12/4/2023 10:07 AM    DATE OF DISCHARGE: 12/5/2023   PRIMARY CARE PROVIDER: Favian Vázquez MD     ATTENDING PHYSICIAN: Raquel Booker MD  DISCHARGING PROVIDER: ERIN Pozo NP    To contact this individual call [phone] and ask the  to page. If unavailable ask to be transferred the Adult Hospitalist Department. CONSULTATIONS: None    PROCEDURES/SURGERIES: * No surgery found *     ADMITTING DIAGNOSES & HOSPITAL COURSE:       History of Presenting Illness:   Alia Cunha is a 70 y.o. female with a PMHx of GERD, HTN, HLD, Hypothyroidism, Vasculitis and COPD. She presents to the ED from home with a chief complaint of shortness of breath and wheezing that have been worsening over the past week. Notably she did have a rituxan infusion for her vasculitis recently and she is concerned this has immunosuppressed her. She reports increased work of breathing and shortness of breath. The patient denies any headache, blurry vision, sore throat, trouble swallowing, trouble with speech, chest pain, SOB, cough, fever, chills, N/V/D, abd pain, urinary symptoms, constipation, recent travels, sick contacts, focal or generalized neurological symptoms, falls, injuries, rashes, contact with COVID-19 diagnosed patients, hematemesis, melena, hemoptysis, hematuria, rashes, denies starting any new medications and denies any other concerns or problems besides as mentioned above.             DISCHARGE DIAGNOSES / PLAN:      ?COPD Exacerbation  We will continue prednisone for total of 5 days  We will also continue azithromycin for COPD exacerbation  Nebulizer treatments  Continuing guaifenesin  Patient has subsequently developed thrush, will continue with nystatin swish and swallow       GERD  - PPI daily     HTN  - Continue home amlodipine, HCTZ  - BP per unit protocols     Hypothyroid  - Continue home levothyroxine  - Check TSH and Free T4     Vasculitis  - Hold methotrexate while acutely ill       PENDING TEST RESULTS:   At the time of discharge the following test results are still pending:     FOLLOW UP APPOINTMENTS:    Follow-up Information       Follow up With Specialties Details Why Contact Marck Pete MD Family Medicine Follow up in 1 week(s)  [address] Katelynn Lowery.  Hailey Ibrahim MD Family Medicine   [address] [place] [id number] [phone]                ADDITIONAL CARE RECOMMENDATIONS: Wear a mask when outside    DIET: regular diet      ACTIVITY: activity as tolerated    WOUND CARE: na    EQUIPMENT needed:       DISCHARGE MEDICATIONS:     Medication List        STOP taking these medications      sodium chloride 0.9 % SOLN 250 mL with riTUXimab 10 MG/ML SOLN 375 mg/m2            ASK your doctor about these medications      acetaminophen 325 MG tablet  Commonly known as: TYLENOL     albuterol 0.63 MG/3ML nebulizer solution  Commonly known as: ACCUNEB     Aleve 220 MG Caps  Generic drug: Naproxen Sodium     amLODIPine 5 MG tablet  Commonly known as: NORVASC     aspirin 81 MG EC tablet     Calcium Carb-Cholecalciferol 600-25 MG-MCG Caps     Cholecalciferol 50 MCG (2000 UT) Tabs     COD LIVER OIL PO     cyanocobalamin 1000 MCG tablet     dextromethorphan-guaiFENesin  MG per extended release tablet  Commonly known as: MUCINEX DM     leucovorin calcium 5 MG tablet  Commonly known as: WELLCOVORIN     levothyroxine 100 MCG tablet  Commonly known as: SYNTHROID     methotrexate 2.5 MG chemo tablet  Commonly known as: RHEUMATREX     mupirocin 2 % ointment  Commonly known as: BACTROBAN     pantoprazole 40 MG tablet  Commonly known as: PROTONIX     potassium chloride 10 MEQ extended release tablet  Commonly known as: KLOR-CON     pravastatin 20 MG tablet  Commonly known as: PRAVACHOL     PROBIOTIC ACIDOPHILUS

## 2023-12-09 LAB
BACTERIA SPEC CULT: NORMAL
BACTERIA SPEC CULT: NORMAL
SERVICE CMNT-IMP: NORMAL
SERVICE CMNT-IMP: NORMAL

## 2024-04-09 ENCOUNTER — TRANSCRIBE ORDERS (OUTPATIENT)
Facility: HOSPITAL | Age: 69
End: 2024-04-09

## 2024-04-09 DIAGNOSIS — Z12.31 VISIT FOR SCREENING MAMMOGRAM: Primary | ICD-10-CM

## 2024-05-08 ENCOUNTER — HOSPITAL ENCOUNTER (OUTPATIENT)
Facility: HOSPITAL | Age: 69
Discharge: HOME | End: 2024-05-11
Payer: MEDICARE

## 2024-05-08 DIAGNOSIS — Z12.31 VISIT FOR SCREENING MAMMOGRAM: ICD-10-CM

## 2024-05-08 PROCEDURE — 77063 BREAST TOMOSYNTHESIS BI: CPT

## 2024-05-24 ENCOUNTER — OFFICE VISIT (OUTPATIENT)
Age: 69
End: 2024-05-24

## 2024-05-24 VITALS
TEMPERATURE: 98.5 F | DIASTOLIC BLOOD PRESSURE: 82 MMHG | BODY MASS INDEX: 22.05 KG/M2 | HEART RATE: 81 BPM | WEIGHT: 136.6 LBS | SYSTOLIC BLOOD PRESSURE: 122 MMHG | OXYGEN SATURATION: 95 %

## 2024-05-24 DIAGNOSIS — B97.89 SORE THROAT (VIRAL): ICD-10-CM

## 2024-05-24 DIAGNOSIS — J02.8 SORE THROAT (VIRAL): ICD-10-CM

## 2024-05-24 DIAGNOSIS — N30.00 ACUTE CYSTITIS WITHOUT HEMATURIA: Primary | ICD-10-CM

## 2024-05-24 LAB
BILIRUBIN, URINE, POC: NEGATIVE
BLOOD URINE, POC: NORMAL
GLUCOSE URINE, POC: NEGATIVE
KETONES, URINE, POC: NEGATIVE
LEUKOCYTE ESTERASE, URINE, POC: NEGATIVE
NITRITE, URINE, POC: NEGATIVE
PH, URINE, POC: 6 (ref 4.6–8)
PROTEIN,URINE, POC: NEGATIVE
SPECIFIC GRAVITY, URINE, POC: 1.02 (ref 1–1.03)
STREP PYOGENES DNA, POC: NEGATIVE
URINALYSIS CLARITY, POC: CLEAR
URINALYSIS COLOR, POC: YELLOW
UROBILINOGEN, POC: NORMAL
VALID INTERNAL CONTROL, POC: NORMAL

## 2024-05-24 RX ORDER — NITROFURANTOIN 25; 75 MG/1; MG/1
100 CAPSULE ORAL 2 TIMES DAILY
Qty: 14 CAPSULE | Refills: 0 | Status: SHIPPED | OUTPATIENT
Start: 2024-05-24 | End: 2024-05-31

## 2024-05-24 RX ORDER — RALOXIFENE HYDROCHLORIDE 60 MG/1
60 TABLET, FILM COATED ORAL DAILY
COMMUNITY

## 2024-05-24 RX ORDER — PHENAZOPYRIDINE HYDROCHLORIDE 200 MG/1
200 TABLET, FILM COATED ORAL 3 TIMES DAILY PRN
Qty: 9 TABLET | Refills: 0 | Status: SHIPPED | OUTPATIENT
Start: 2024-05-24 | End: 2024-05-27

## 2024-05-24 ASSESSMENT — ENCOUNTER SYMPTOMS
NAUSEA: 0
SHORTNESS OF BREATH: 0
COUGH: 0
SORE THROAT: 1
DIARRHEA: 0
VOMITING: 0

## 2024-05-24 NOTE — PATIENT INSTRUCTIONS
Thank you for visiting [facility] today.    -Increase fluid intake.  Some people say cranberry juice helps with discomfort.  -Don't hold your urine.  Urinate when you feel like you need to.  -Wipe from front to back after bowel movements.  -If sexually active, urinate after intercourse and use enough lubrication.   -Avoid taking bubble baths.  -Wear loose fitting clothing.  -Decrease caffeine or carbonated drinks  -Repeat urine screen in two weeks  -Take antibiotic with food and consider probiotic    Follow up with your PCP if symptoms persist or worsen.    Please go to the Emergency Department immediately for symptoms of a urinary tract infection along with any of the following:  fever with severe and sudden shaking (rigors), nausea, vomiting and the inability to keep down clear fluids or medications. Thank you for visiting [facility] today.    -Tylenol/Ibuprofen for pain/fever  -Throat lozenges or throat sprays may help with discomfort  -Salt water gargles with 1/2 teaspoon to 1 teaspoon of Benadryl  -Soft, cold foods may soothe your throat as well as ice chips  -Increase humidity in house  -Viscous lidocaine gargles, if prescribed  -Follow up with ENT if no improvement    If you begin to have worsening pain, uncontrollable fever greater than 100.4 or difficulty swallowing, please go to the ER.
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
## 2024-12-09 ENCOUNTER — OFFICE VISIT (OUTPATIENT)
Age: 69
End: 2024-12-09

## 2024-12-09 VITALS
TEMPERATURE: 98.2 F | WEIGHT: 162.4 LBS | OXYGEN SATURATION: 97 % | SYSTOLIC BLOOD PRESSURE: 126 MMHG | BODY MASS INDEX: 26.21 KG/M2 | DIASTOLIC BLOOD PRESSURE: 84 MMHG | HEART RATE: 73 BPM

## 2024-12-09 DIAGNOSIS — N30.00 ACUTE CYSTITIS WITHOUT HEMATURIA: Primary | ICD-10-CM

## 2024-12-09 LAB
BILIRUBIN, URINE, POC: NEGATIVE
BLOOD URINE, POC: ABNORMAL
GLUCOSE URINE, POC: NEGATIVE
KETONES, URINE, POC: NEGATIVE
LEUKOCYTE ESTERASE, URINE, POC: ABNORMAL
NITRITE, URINE, POC: POSITIVE
PH, URINE, POC: 5.5 (ref 4.6–8)
PROTEIN,URINE, POC: 100
SPECIFIC GRAVITY, URINE, POC: 1.02 (ref 1–1.03)
URINALYSIS CLARITY, POC: CLEAR
URINALYSIS COLOR, POC: YELLOW
UROBILINOGEN, POC: ABNORMAL

## 2024-12-09 RX ORDER — CIPROFLOXACIN 500 MG/1
500 TABLET, FILM COATED ORAL 2 TIMES DAILY
Qty: 14 TABLET | Refills: 0 | Status: SHIPPED | OUTPATIENT
Start: 2024-12-09 | End: 2024-12-16

## 2024-12-09 ASSESSMENT — ENCOUNTER SYMPTOMS
NAUSEA: 0
VOMITING: 0
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
## 2025-02-05 ENCOUNTER — OFFICE VISIT (OUTPATIENT)
Age: 70
End: 2025-02-05

## 2025-02-05 VITALS
TEMPERATURE: 97.7 F | RESPIRATION RATE: 66 BRPM | SYSTOLIC BLOOD PRESSURE: 147 MMHG | DIASTOLIC BLOOD PRESSURE: 83 MMHG | HEIGHT: 66 IN | OXYGEN SATURATION: 97 % | WEIGHT: 148.8 LBS | HEART RATE: 68 BPM | BODY MASS INDEX: 23.91 KG/M2

## 2025-02-05 DIAGNOSIS — N30.01 ACUTE CYSTITIS WITH HEMATURIA: Primary | ICD-10-CM

## 2025-02-05 LAB
BILIRUBIN, URINE, POC: NEGATIVE
BLOOD URINE, POC: NORMAL
GLUCOSE URINE, POC: NEGATIVE
KETONES, URINE, POC: NEGATIVE
LEUKOCYTE ESTERASE, URINE, POC: NORMAL
NITRITE, URINE, POC: NEGATIVE
PH, URINE, POC: 6 (ref 4.6–8)
PROTEIN,URINE, POC: NEGATIVE
SPECIFIC GRAVITY, URINE, POC: 1.02 (ref 1–1.03)
URINALYSIS CLARITY, POC: CLEAR
URINALYSIS COLOR, POC: YELLOW
UROBILINOGEN, POC: NORMAL

## 2025-02-05 RX ORDER — CIPROFLOXACIN 500 MG/1
500 TABLET, FILM COATED ORAL 2 TIMES DAILY
Qty: 14 TABLET | Refills: 0 | Status: SHIPPED | OUTPATIENT
Start: 2025-02-05 | End: 2025-02-12

## 2025-02-05 ASSESSMENT — ENCOUNTER SYMPTOMS
NAUSEA: 0
VOMITING: 0
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
## 2025-06-13 ENCOUNTER — TRANSCRIBE ORDERS (OUTPATIENT)
Facility: HOSPITAL | Age: 70
End: 2025-06-13

## 2025-06-13 DIAGNOSIS — Z12.31 VISIT FOR SCREENING MAMMOGRAM: Primary | ICD-10-CM

## 2025-07-10 ENCOUNTER — HOSPITAL ENCOUNTER (OUTPATIENT)
Facility: HOSPITAL | Age: 70
Discharge: HOME | End: 2025-07-13
Payer: MEDICARE

## 2025-07-10 DIAGNOSIS — Z12.31 VISIT FOR SCREENING MAMMOGRAM: ICD-10-CM

## 2025-07-10 PROCEDURE — 77063 BREAST TOMOSYNTHESIS BI: CPT

## (undated) DEVICE — GARMENT,MEDLINE,DVT,INT,CALF,MED, GEN2: Brand: MEDLINE

## (undated) DEVICE — BANDAGE,GAUZE,BULKEE II,4.5"X4.1YD,STRL: Brand: MEDLINE

## (undated) DEVICE — LIQUIBAND RAPID ADHESIVE 36/CS 0.8ML: Brand: MEDLINE

## (undated) DEVICE — SUTURE VCRL SZ 3-0 L18IN ABSRB UD PS-2 L19MM 3/8 CRV PRIM J497H

## (undated) DEVICE — PACK,BASIC,SIRUS,V: Brand: MEDLINE

## (undated) DEVICE — COVER LT HNDL PLAS RIG 1 PER PK

## (undated) DEVICE — BLADE ASSEMB CLP HAIR FINE --

## (undated) DEVICE — SUTURE VCRL SZ 4-0 L27IN ABSRB UD L19MM PS-2 3/8 CIR PRIM J426H

## (undated) DEVICE — PACK,EENT,TURBAN DRAPE,PK II: Brand: MEDLINE

## (undated) DEVICE — SUT PROL 4-0 18IN P3 BLU --

## (undated) DEVICE — SYR 10ML LUER LOK 1/5ML GRAD --

## (undated) DEVICE — STERILE POLYISOPRENE POWDER-FREE SURGICAL GLOVES WITH EMOLLIENT COATING: Brand: PROTEXIS

## (undated) DEVICE — DISPOSABLE TOURNIQUET CUFF SINGLE BLADDER, DUAL PORT AND QUICK CONNECT CONNECTOR: Brand: COLOR CUFF

## (undated) DEVICE — BANDAGE,ELASTIC,ESMARK,STERILE,4"X9',LF: Brand: MEDLINE

## (undated) DEVICE — DRESSING EAR AD 5.5IN GLSCOCK

## (undated) DEVICE — BLADE TYMPLSTY W2.5MM 60DEG SHRP ALL ARND BVL DN

## (undated) DEVICE — INTENT OT USE PROVIDES A STERILE INTERFACE BETWEEN THE OPERATING ROOM SURGICAL LAMPS (NON-STERILE) AND THE SURGEON OR STAFF WORKING IN THE STERILE FIELD.: Brand: ASPEN® ALC PLUS LIGHT HANDLE COVER

## (undated) DEVICE — SYR 3ML LL TIP 1/10ML GRAD --

## (undated) DEVICE — REM POLYHESIVE ADULT PATIENT RETURN ELECTRODE: Brand: VALLEYLAB

## (undated) DEVICE — CAP PROTCT PIN 0.045INX0.89MM --

## (undated) DEVICE — 4.0MM ROUND FLUTED AGGRESSIVE

## (undated) DEVICE — SOLUTION IRRIG 1000ML 0.9% SOD CHL USP POUR PLAS BTL

## (undated) DEVICE — SURGIFOAM SPNG SZ 12-7

## (undated) DEVICE — BANDAGE COBAN 4 IN COMPR W4INXL5YD FOAM COHESIVE QUIK STK SELF ADH SFT

## (undated) DEVICE — ELECTRODE 8227410 PAIRED 2 CH SET ROHS

## (undated) DEVICE — CRANIOTOMY DRAPE, STERILE: Brand: MEDLINE

## (undated) DEVICE — GOWN,SIRUS,NONRNF,SETINSLV,XL,20/CS: Brand: MEDLINE

## (undated) DEVICE — SOLUTION IRRIG 1000ML STRL H2O USP PLAS POUR BTL

## (undated) DEVICE — SYR LR LCK 1ML GRAD NSAF 30ML --

## (undated) DEVICE — SOLUTION IRRIG 3000ML 0.9% SOD CHL USP UROMATIC PLAS CONT

## (undated) DEVICE — KIT DISP W/ SPEAR TRCR TIP OBT AND 2.6MM DRL FOR 2.6

## (undated) DEVICE — TUBING SUCT MASTOID TWO IRRIG SPIK ALL IN 1 LTWT FLX LEVIN

## (undated) DEVICE — WAX SURG 2.5GM HEMSTAT BNE BEESWAX PARAFFIN ISO PALMITATE

## (undated) DEVICE — SPONGE GZ W4XL4IN COT 12 PLY TYP VII WVN C FLD DSGN

## (undated) DEVICE — 40418 TRENDELENBURG ONE-STEP ARM PROTECTORS LARGE (1 PAIR): Brand: 40418 TRENDELENBURG ONE-STEP ARM PROTECTORS LARGE (1 PAIR)

## (undated) DEVICE — BLADE ES ELASTOMERIC COAT INSUL DURABLE BEND UPTO 90DEG

## (undated) DEVICE — BONE WAX WHITE: Brand: BONE WAX WHITE

## (undated) DEVICE — SYR 10ML CTRL LR LCK NSAF LF --

## (undated) DEVICE — SURGIFOAM SPNG SZ 100

## (undated) DEVICE — MASTISOL ADHESIVE LIQ 2/3ML

## (undated) DEVICE — INSULATED BLADE ELECTRODE: Brand: EDGE

## (undated) DEVICE — SOLUTION SURG PREP 26 CC PURPREP

## (undated) DEVICE — BLADE CLIPPER GEN PURP NS

## (undated) DEVICE — DRSG GZ OIL EMUL CURAD 3X3 --

## (undated) DEVICE — NEEDLE HYPO 27GA L1.25IN GRY POLYPR HUB S STL REG BVL STR

## (undated) DEVICE — SYRINGE,EAR/ULCER, 2 OZ, STERILE: Brand: MEDLINE

## (undated) DEVICE — WIPE 400300 MEROCEL 20PK INSTRUMENT: Brand: MEROCEL®

## (undated) DEVICE — CORD ES L12FT BPLR FRCP

## (undated) DEVICE — BANDAGE,GAUZE,CONFORMING,3"X75",STRL,LF: Brand: MEDLINE

## (undated) DEVICE — GLOVE SURG SZ 8 L12IN FNGR THK94MIL STD WHT LTX FREE

## (undated) DEVICE — DRAPE SHT 3 QTR PROXIMA 53X77 --

## (undated) DEVICE — NEEDLE HYPO 18GA L1.5IN PNK S STL HUB POLYPR SHLD REG BVL

## (undated) DEVICE — GLOVE ORANGE PI 8   MSG9080

## (undated) DEVICE — TRAY PREP DRY W/ PREM GLV 2 APPL 6 SPNG 2 UNDPD 1 OVERWRAP

## (undated) DEVICE — SURGICAL PROCEDURE PACK BASIN MAJ SET CUST NO CAUT

## (undated) DEVICE — PRECISION OFFSET (9.0 X 0.51 X 31.0MM)

## (undated) DEVICE — DRAPE,EXTREMITY,89X128,STERILE: Brand: MEDLINE

## (undated) DEVICE — 1010 S-DRAPE TOWEL DRAPE 10/BX: Brand: STERI-DRAPE™

## (undated) DEVICE — PENCIL SMK EVAC ALL IN 1 DSGN ENH VISIBILITY IMPROVED AIR

## (undated) DEVICE — NEEDLE HYPO 25GA L1.5IN BVL ORIENTED ECLIPSE

## (undated) DEVICE — PENCIL ES L3M BTTN SWCH S STL HEX LOK BLDE ELECTRD HOLSTER

## (undated) DEVICE — DRAPE MICSCP W46XL120IN FOR ZEISS MD

## (undated) DEVICE — STRAP,POSITIONING,KNEE/BODY,FOAM,4X60": Brand: MEDLINE

## (undated) DEVICE — INFECTION CONTROL KIT SYS

## (undated) DEVICE — PROBE 8225101 5PK STD PRASS FL TIP ROHS

## (undated) DEVICE — GLOVE SURG SZ 85 L12IN FNGR THK94MIL STD WHT LTX FREE

## (undated) DEVICE — BASIN ST MAJOR-NO CAUTERY: Brand: MEDLINE INDUSTRIES, INC.

## (undated) DEVICE — COVER,MAYO STAND,STERILE: Brand: MEDLINE

## (undated) DEVICE — SMOKE EVACUATION PENCIL: Brand: VALLEYLAB

## (undated) DEVICE — ENT-SMH: Brand: MEDLINE INDUSTRIES, INC.

## (undated) DEVICE — PADDING CST CRMPD 3INX4YD NS --

## (undated) DEVICE — SOL IRR SOD CL 0.9% 1000ML BTL --

## (undated) DEVICE — BLADE OPHTH MINI BEAV SHRP --

## (undated) DEVICE — ELECTRODE PT RET AD L9FT HI MOIST COND ADH HYDRGEL CORDED

## (undated) DEVICE — DRAPE MICSCP W46XL120IN POLY DRAWSTRAP W STEREO OBS TB AND

## (undated) DEVICE — DRILL CANNULATED

## (undated) DEVICE — BASIC SINGLE BASIN BTC-LF: Brand: MEDLINE INDUSTRIES, INC.

## (undated) DEVICE — GUIDEWIRE 1.6X100MM SGL TROCAR: Brand: VILEX GUIDEWIRE

## (undated) DEVICE — BLADE OPHTH GRN ROUNDED TIP 1 SIDE SHRP GRINDLESS MINI-BLDE

## (undated) DEVICE — BIPOLAR FORCEPS CORD: Brand: VALLEYLAB

## (undated) DEVICE — 6.0MM ROUND FLUTED AGGRESSIVE

## (undated) DEVICE — GLOVE SURG SZ 85 L12IN FNGR THK79MIL GRN LTX FREE

## (undated) DEVICE — Device

## (undated) DEVICE — HANDLE LT SNAP ON ULT DURABLE LENS FOR TRUMPF ALC DISPOSABLE

## (undated) DEVICE — DERMABOND SKIN ADH 0.7ML -- DERMABOND ADVANCED 12/BX

## (undated) DEVICE — 3.0MM ROUND FLUTED AGGRESSIVE

## (undated) DEVICE — INTENDED FOR TISSUE SEPARATION, AND OTHER PROCEDURES THAT REQUIRE A SHARP SURGICAL BLADE TO PUNCTURE OR CUT.: Brand: BARD-PARKER ® CARBON RIB-BACK BLADES